# Patient Record
Sex: MALE | Race: WHITE | Employment: FULL TIME | URBAN - METROPOLITAN AREA
[De-identification: names, ages, dates, MRNs, and addresses within clinical notes are randomized per-mention and may not be internally consistent; named-entity substitution may affect disease eponyms.]

---

## 2019-05-01 ENCOUNTER — HOSPITAL ENCOUNTER (OUTPATIENT)
Dept: WOUND CARE | Age: 64
Discharge: HOME | End: 2019-05-01
Payer: COMMERCIAL

## 2019-05-01 VITALS
HEART RATE: 102 BPM | TEMPERATURE: 99 F | RESPIRATION RATE: 18 BRPM | WEIGHT: 315 LBS | BODY MASS INDEX: 47.74 KG/M2 | HEIGHT: 68 IN | SYSTOLIC BLOOD PRESSURE: 158 MMHG | DIASTOLIC BLOOD PRESSURE: 66 MMHG

## 2019-05-01 PROCEDURE — 11042 DBRDMT SUBQ TIS 1ST 20SQCM/<: CPT

## 2019-05-01 RX ORDER — METFORMIN HYDROCHLORIDE 500 MG/1
500 TABLET ORAL
COMMUNITY

## 2019-05-01 RX ORDER — FUROSEMIDE 20 MG/1
20 TABLET ORAL 2 TIMES DAILY
Status: ON HOLD | COMMUNITY
End: 2019-07-20 | Stop reason: SDUPTHER

## 2019-05-01 RX ORDER — LISINOPRIL 10 MG/1
10 TABLET ORAL DAILY
COMMUNITY

## 2019-05-01 RX ORDER — AMLODIPINE BESYLATE 5 MG/1
5 TABLET ORAL DAILY
COMMUNITY

## 2019-05-01 RX ORDER — POTASSIUM CHLORIDE 750 MG/1
20 TABLET, FILM COATED, EXTENDED RELEASE ORAL DAILY
COMMUNITY

## 2019-05-01 RX ORDER — MELOXICAM 15 MG/1
15 TABLET ORAL
COMMUNITY

## 2019-05-01 NOTE — WOUND CARE
Wound Date Acquired:  4/24/19 How was the wound acquired:Unknown Who referred to wound clinic: Deena Lay MD PCP Any cultures done:None Antibiotic use current or past: Augmentin x 15 days 4/1/19 for Right leg How as the wound be treated:Neosporin,  Non-Adherent 05/01/19 1338 Wound Leg lower Left; Anterior Date First Assessed/Time First Assessed: 05/01/19 1337   Present on Hospital Admission: Yes  Wound Approximate Age at First Assessment (Weeks): 2 weeks  Primary Wound Type: Venous  Location: Leg lower  Wound Location Orientation: Left; Anterior Dressing Status Removed Dressing Type Non adherent;Gauze Wound Length (cm) 0.9 cm Wound Width (cm) 1.4 cm Wound Depth (cm) 0.2 cm Wound Volume (cm^3) 0.25 cm^3 Condition of [facility] Condition of Edges Rolled/curled Drainage Amount Moderate Drainage Color Serosanguinous Wound Odor None Janna-wound Assessment Blanchable erythema Margins Epibole (rolled edges) Cleansing and Cleansing Agents  Normal saline Visit Vitals /66 (BP 1 Location: Right arm, BP Patient Position: Sitting) Pulse (!) 102 Temp 99 °F (37.2 °C) Resp 18 Ht 5' 8\" (1.727 m) Wt (!) 190.5 kg (420 lb) BMI 63.86 kg/m²

## 2019-05-01 NOTE — WOUND CARE
05/01/19 1423 Wound Leg lower Left; Anterior Date First Assessed/Time First Assessed: 05/01/19 1337   Present on Hospital Admission: Yes  Wound Approximate Age at First Assessment (Weeks): 2 weeks  Primary Wound Type: Venous  Location: Leg lower  Wound Location Orientation: Left; Anterior Dressing Type Applied Collagens/cell matrix;Gauze; Other (Comment) (Double Tubi  size G stockings) Discharge Condition:Stable Ambulatory Status:Ambulatory Discharge Destination:Home Transportation: Private Accompanied by: Self

## 2019-05-02 NOTE — CONSULTS
[address]    Name:  Adam Dewitt  MR#:  [id number]  :  1955  ACCOUNT #:  [de-identified]  DATE OF SERVICE:  2019    CONSULTING PHYSICIAN:  John Causey DPM    REASON FOR CONSULTATION:  Evaluation and treatment of left leg venous insufficiency-type ulceration. HISTORY OF PRESENT ILLNESS:  The patient is a pleasant 62-year-old white male with a past medical history which includes morbid obesity, sleep apnea, hypertension, asthma, and previous lower extremity ulcerations. He presents with a 1-week history of the wounds on the anterolateral aspect of his upper left leg below the knee. He denies any fevers, chills, sweats, nausea, or vomiting. He denies any trauma to the area. He does relate a history of wounds on his right leg, which have eventually healed that he attributes to the swelling in his legs. PAST MEDICAL HISTORY:  As above. PAST SURGICAL HISTORY:  Significant for ear, nose, and throat surgery. SOCIAL HISTORY:  Positive for being a former smoker. He quit in . Negative for alcohol or drug use. ALLERGIES:  TO PHENOBARBITAL. FAMILY HISTORY:  Significant for diabetes mellitus and heart disease. MEDICATIONS:  Are as follows:  1. Norvasc 5 mg. 2.  Lasix 20 mg.  3.  Lisinopril 10 mg.  4.  Meloxicam 15 mg.  5.  Metformin 500 mg.  6.  Potassium chloride 10 mEq. REVIEW OF SYSTEMS:  Positive for bilateral lower extremity edema. PHYSICAL EXAMINATION:  Upon physical examination, the patient's vital signs are stable. He is afebrile. Examination of his legs reveals hyperpigmentation, edema, and induration in both legs consistent with chronic venous insufficiency. The patient has nonpalpable but clearly Dopplerable DP and PT pulses on the left lower extremity with triphasic waveforms.   He is noted to have a full-thickness sloughy wound on the anterolateral aspect of the left lower leg measuring 0.9 x 1.4 x 0.2 cm in size without any gross signs of infection. ASSESSMENT AND PLAN:  Left lower extremity venous insufficiency-type wound in a patient with extensive edema, venous insufficiency, and morbid obesity. At today's visit, we applied the lidocaine jelly solution to the wound bed in order to achieve anesthesia, and then utilizing a dermal curette, all fibrinous slough and nonviable tissue was excisionally debrided through the wound through full-thickness skin and subcutaneous tissues down to a healthy bleeding base. Hemostasis was achieved with pressure and compression. After hemostasis was achieved, an Aquacel Ag dressing was applied followed by double Tubigrip stockings for compression. The patient did have ABIs performed with a screening device showing an FABIAN of 0.82 on the left. Left lower extremity venous insufficiency-type ulceration with significant underlying edema at today's visit. We have sent the patient for formal PVRs and ABIs and TBIs to assess the patient's circulation to ensure that he can proceed with compression therapy. We sized him for CircAid-like stockings today as these will certainly benefit him in helping him heal the wound as well as keep these types of wounds from coming back. In the meantime, we have started him on some local wound care with Tubigrip stockings and Aquacel Ag, and he will return for followup in approximately 2 to 3 weeks.       JENARO [id number]  D:  05/01/2019 14:26  T:  05/01/2019 14:34  JOB #:  [id number]  CC:  Maggi Castellano MD

## 2019-05-15 ENCOUNTER — HOSPITAL ENCOUNTER (OUTPATIENT)
Dept: WOUND CARE | Age: 64
Discharge: HOME | End: 2019-05-15
Payer: COMMERCIAL

## 2019-05-15 VITALS
TEMPERATURE: 98.1 F | RESPIRATION RATE: 18 BRPM | DIASTOLIC BLOOD PRESSURE: 79 MMHG | HEART RATE: 83 BPM | SYSTOLIC BLOOD PRESSURE: 129 MMHG

## 2019-05-15 PROCEDURE — 74011000250 HC RX REV CODE- 250: Performed by: PODIATRIST

## 2019-05-15 PROCEDURE — 97597 DBRDMT OPN WND 1ST 20 CM/<: CPT

## 2019-05-15 RX ADMIN — Medication: at 16:00

## 2019-05-15 NOTE — PROGRESS NOTES
Kahlil Qureshi Name:  Melia Castillo 
MR#:  [id number] :  1955 ACCOUNT #:  [de-identified] DATE OF SERVICE:  05/15/2019 CONSULTING PHYSICIAN:  Deya Recinos DPM 
 
REASON FOR CONSULTATION:  Evaluation and treatment of left leg venous insufficiency-type ulceration. HISTORY OF PRESENT ILLNESS:  The patient is a pleasant 45-year-old white male with a past medical history which includes morbid obesity, sleep apnea, hypertension, asthma, and previous lower extremity ulcerations. He presents with a 1-week history of the wounds on the anterolateral aspect of his upper left leg below the knee. He denies any fevers, chills, sweats, nausea, or vomiting. He denies any trauma to the area. He does relate a history of wounds on his right leg, which have eventually healed that he attributes to the swelling in his legs. PAST MEDICAL HISTORY:  As above. PAST SURGICAL HISTORY:  Significant for ear, nose, and throat surgery. SOCIAL HISTORY:  Positive for being a former smoker. He quit in . Negative for alcohol or drug use. ALLERGIES:  TO PHENOBARBITAL. FAMILY HISTORY:  Significant for diabetes mellitus and heart disease. MEDICATIONS:  Are as follows: 1. Norvasc 5 mg. 2.  Lasix 20 mg. 
3.  Lisinopril 10 mg. 
4.  Meloxicam 15 mg. 
5.  Metformin 500 mg. 
6.  Potassium chloride 10 mEq. REVIEW OF SYSTEMS:  Positive for bilateral lower extremity edema. PHYSICAL EXAMINATION:  Upon physical examination, the patient's vital signs are stable. He is afebrile. Examination of his legs reveals hyperpigmentation, edema, and induration in both legs consistent with chronic venous insufficiency. The patient has nonpalpable but clearly Dopplerable DP and PT pulses on the left lower extremity with triphasic waveforms.   He is noted to have a full-thickness sloughy wound on the anterolateral aspect of the left lower leg measuring 0.8 x 1.2 x 0.2 cm in size without any gross signs of infection. ASSESSMENT AND PLAN:  Left lower extremity venous insufficiency-type wound in a patient with extensive edema, venous insufficiency, and morbid obesity. At today's visit, we applied the lidocaine jelly solution to the wound bed in order to achieve anesthesia, and then utilizing a dermal curette, all fibrinous slough and nonviable tissue was selectively debrided through the wound through full-thickness skin and subcutaneous tissues down to a healthy bleeding base. Hemostasis was achieved with pressure and compression. After hemostasis was achieved, an Aquacel Ag dressing was applied followed by double Tubigrip stockings for compression. The patient did have ABIs performed with a screening device showing an FABIAN of 0.82 on the left. Left lower extremity venous insufficiency-type ulceration with significant underlying edema at today's visit. We sized him for CircAid-like stockings last visit and have instructed him to bring these to his next visit. In the meantime, we have started him on some local wound care with Tubigrip stockings and Aquacel Ag, and he will return for followup in approximately 2 to 3 weeks.  
 
 
Tanya Velasco DPM

## 2019-05-15 NOTE — WOUND CARE
05/15/19 1517 Wound Leg lower Left; Anterior Date First Assessed/Time First Assessed: 05/01/19 1337   Present on Hospital Admission: Yes  Wound Approximate Age at First Assessment (Weeks): 2 weeks  Primary Wound Type: Venous  Location: Leg lower  Wound Location Orientation: Left; Anterior Dressing Status Removed Dressing Type Gauze Wound Length (cm) 0.8 cm Wound Width (cm) 1.2 cm Wound Depth (cm) 0.2 cm Wound Volume (cm^3) 0.19 cm^3 [facility] Condition of Edges Rolled/curled Drainage Amount Small Drainage Color Serosanguinous Wound Odor None Janna-wound Assessment Blanchable erythema Margins Epibole (rolled edges) Cleansing and Cleansing Agents  Normal saline Wound Leg lower Right; Anterior Date First Assessed/Time First Assessed: 05/15/19 1519   Present on Hospital Admission: Yes  Primary Wound Type: Venous Ulcer  Location: Leg lower  Wound Location Orientation: Right; Anterior Dressing Type Open to air Wound Length (cm) 7 cm Wound Width (cm) 8.5 cm Wound Depth (cm) 0.1 cm Wound Volume (cm^3) 5.95 cm^3 Condition of Base Pink Drainage Amount Moderate Drainage Color Serous Wound Odor None Janna-wound Assessment Blanchable erythema Cleansing and Cleansing Agents  Normal saline Visit Vitals /79 (BP 1 Location: Right arm, BP Patient Position: Sitting) Pulse 83 Temp 98.1 °F (36.7 °C) Resp 18

## 2019-05-29 ENCOUNTER — HOSPITAL ENCOUNTER (OUTPATIENT)
Dept: WOUND CARE | Age: 64
Discharge: HOME | End: 2019-05-29
Payer: COMMERCIAL

## 2019-05-29 VITALS
HEART RATE: 79 BPM | DIASTOLIC BLOOD PRESSURE: 67 MMHG | SYSTOLIC BLOOD PRESSURE: 122 MMHG | TEMPERATURE: 98.6 F | RESPIRATION RATE: 18 BRPM

## 2019-05-29 PROCEDURE — 29581 APPL MULTLAYER CMPRN SYS LEG: CPT

## 2019-05-29 NOTE — PROGRESS NOTES
[address]    Name:  Glen Ohara  MR#:  [id number]  :  1955  ACCOUNT #:  [de-identified]  DATE OF SERVICE:  2019    CONSULTING PHYSICIAN:  Hernan Krueger DPM    REASON FOR CONSULTATION:  Evaluation and treatment of left leg venous insufficiency-type ulceration. HISTORY OF PRESENT ILLNESS:  The patient is a pleasant 77-year-old white male with a past medical history which includes morbid obesity, sleep apnea, hypertension, asthma, and previous lower extremity ulcerations. He presents with a 1-week history of the wounds on the anterolateral aspect of his upper left leg below the knee. He denies any fevers, chills, sweats, nausea, or vomiting. He denies any trauma to the area. He does relate a history of wounds on his right leg, which have eventually healed that he attributes to the swelling in his legs. He did go for his NIVS but does not have the results with him. We will call for the results. PAST MEDICAL HISTORY:  As above. PAST SURGICAL HISTORY:  Significant for ear, nose, and throat surgery. SOCIAL HISTORY:  Positive for being a former smoker. He quit in . Negative for alcohol or drug use. ALLERGIES:  TO PHENOBARBITAL. FAMILY HISTORY:  Significant for diabetes mellitus and heart disease. MEDICATIONS:  Are as follows:  1. Norvasc 5 mg. 2.  Lasix 20 mg.  3.  Lisinopril 10 mg.  4.  Meloxicam 15 mg.  5.  Metformin 500 mg.  6.  Potassium chloride 10 mEq. REVIEW OF SYSTEMS:  Positive for bilateral lower extremity edema. PHYSICAL EXAMINATION:  Upon physical examination, the patient's vital signs are stable. He is afebrile. Examination of his legs reveals hyperpigmentation, edema, and induration in both legs consistent with chronic venous insufficiency. The patient has nonpalpable but clearly Dopplerable DP and PT pulses on the left lower extremity with triphasic waveforms.   He is noted to have a full-thickness sloughy wound on the anterolateral aspect of the left lower leg measuring 0.8 x 1.2 x 0.2 cm in size without any gross signs of infection. The right leg has a large pre-ulcerative area measuring 9x10 cm in size which is weeping with serous fluid as well. ASSESSMENT AND PLAN:  Left lower extremity venous insufficiency-type wound in a patient with extensive edema, venous insufficiency, and morbid obesity. At today's visit, we applied the lidocaine jelly solution to the wound bed in order to achieve anesthesia, and then utilizing a dermal curette, all fibrinous slough and nonviable tissue was selectively debrided through the wound through full-thickness skin and subcutaneous tissues down to a healthy bleeding base. Hemostasis was achieved with pressure and compression. After hemostasis was achieved, an Aquacel Ag dressing was applied followed by double Tubigrip stockings for compression. We have recommended wearing compression stockings to manage the edema , heal the ulcers and prevent future ulcerations. He has not ordered the device to apply the stockings so  inn the meantime, we have started him on some local wound care with Tubigrip stockings and Aquacel Ag, and he will return for followup in approximately 2 to 3 weeks. We may be able to compress him with 3 layer wraps if his PVRs are adequate.       Jonas Gaines DPM

## 2019-05-29 NOTE — WOUND CARE
05/29/19 1525 Wound Leg lower Right; Anterior Date First Assessed/Time First Assessed: 05/15/19 1519   Present on Hospital Admission: Yes  Primary Wound Type: Venous Ulcer  Location: Leg lower  Wound Location Orientation: Right; Anterior Dressing Type Open to air Wound Length (cm) 9 cm Wound Width (cm) 10 cm Wound Depth (cm) 0.1 cm Wound Volume (cm^3) 9 cm^3 Condition of Base Pink Drainage Amount Moderate Drainage Color Serous Wound Odor None Janna-wound Assessment Blanchable erythema Wound Leg lower Left; Anterior Date First Assessed/Time First Assessed: 05/01/19 1337   Present on Hospital Admission: Yes  Wound Approximate Age at First Assessment (Weeks): 2 weeks  Primary Wound Type: Venous  Location: Leg lower  Wound Location Orientation: Left; Anterior Dressing Status Breakthrough drainage;Removed Dressing Type Gauze;Collagens/cell matrix Wound Length (cm) 1.2 cm Wound Width (cm) 1.6 cm Wound Depth (cm) 0.3 cm Wound Volume (cm^3) 0.58 cm^3 Condition of [facility] Condition of Edges Rolled/curled Drainage Amount Small Drainage Color Serosanguinous Wound Odor None Janna-wound Assessment Blanchable erythema Cleansing and Cleansing Agents  Normal saline Visit Vitals /67 Pulse 79 Temp 98.6 °F (37 °C) Resp 18

## 2019-05-29 NOTE — WOUND CARE
05/29/19 1553 Wound Leg lower Right; Anterior Date First Assessed/Time First Assessed: 05/15/19 1519   Present on Hospital Admission: Yes  Primary Wound Type: Venous Ulcer  Location: Leg lower  Wound Location Orientation: Right; Anterior Dressing Type Applied Silver products; Absorptive; Compression Wrap/Venous Stasis Wound Leg lower Left; Anterior Date First Assessed/Time First Assessed: 05/01/19 1337   Present on Hospital Admission: Yes  Wound Approximate Age at First Assessment (Weeks): 2 weeks  Primary Wound Type: Venous  Location: Leg lower  Wound Location Orientation: Left; Anterior Dressing Type Applied Silver products; Absorptive; Compression Wrap/Venous Stasis Procedure Tolerated Well Discharge Condition:Stable Ambulatory Status:Ambulatory Discharge Destination:Home Transportation: Private Accompanied by: Self

## 2019-06-03 ENCOUNTER — HOSPITAL ENCOUNTER (OUTPATIENT)
Dept: WOUND CARE | Age: 64
Discharge: HOME | End: 2019-06-03
Payer: COMMERCIAL

## 2019-06-03 VITALS
HEART RATE: 91 BPM | DIASTOLIC BLOOD PRESSURE: 73 MMHG | SYSTOLIC BLOOD PRESSURE: 157 MMHG | RESPIRATION RATE: 18 BRPM | TEMPERATURE: 98.4 F

## 2019-06-03 PROCEDURE — 29581 APPL MULTLAYER CMPRN SYS LEG: CPT

## 2019-06-03 NOTE — WOUND CARE
06/03/19 1035   Wound Leg lower Right; Anterior   Date First Assessed/Time First Assessed: 05/15/19 1519   Present on Hospital Admission: Yes  Primary Wound Type: Venous Ulcer  Location: Leg lower  Wound Location Orientation: Right; Anterior   Dressing Status Clean, dry, and intact   Dressing Type Alginate; Compression Wrap/Venous Stasis   Non-staged Wound Description Partial thickness   Drainage Amount Small   Drainage Color Serosanguinous   Wound Odor None   Janna-wound Assessment Intact   Cleansing and Cleansing Agents  Soap and water   Dressing Changed Changed/New   Dressing Type Applied Alginate;4 x 4;Compression Wrap/Venous Stasis   Procedure Tolerated Well   Wound Leg lower Left; Anterior   Date First Assessed/Time First Assessed: 05/01/19 1337   Present on Hospital Admission: Yes  Wound Approximate Age at First Assessment (Weeks): 2 weeks  Primary Wound Type: Venous  Location: Leg lower  Wound Location Orientation: Left; Anterior   Dressing Status Clean, dry, and intact   Dressing Type Dry dressing   Non-staged Wound Description Partial thickness   Condition of Base Pink   Drainage Amount Small   Drainage Color Serosanguinous   Wound Odor None   Janna-wound Assessment Intact   Cleansing and Cleansing Agents  Soap and water   Dressing Changed Changed/New   Dressing Type Applied 4 x 4;Alginate; Compression Wrap/Venous Stasis   Procedure Tolerated Well   Discharge Condition:stable  Ambulatory Status:ambulatory  Discharge Destination:home  Transportation: private auto  Accompanied by: self

## 2019-06-05 ENCOUNTER — HOSPITAL ENCOUNTER (OUTPATIENT)
Dept: WOUND CARE | Age: 64
Discharge: HOME | End: 2019-06-05
Payer: COMMERCIAL

## 2019-06-05 VITALS
RESPIRATION RATE: 18 BRPM | HEART RATE: 85 BPM | TEMPERATURE: 98.4 F | SYSTOLIC BLOOD PRESSURE: 123 MMHG | DIASTOLIC BLOOD PRESSURE: 75 MMHG

## 2019-06-05 PROCEDURE — 74011000250 HC RX REV CODE- 250: Performed by: PODIATRIST

## 2019-06-05 PROCEDURE — 97597 DBRDMT OPN WND 1ST 20 CM/<: CPT

## 2019-06-05 RX ADMIN — Medication: at 16:00

## 2019-06-05 NOTE — WOUND CARE
06/05/19 1554 Wound Leg lower Left; Anterior Date First Assessed/Time First Assessed: 05/01/19 1337   Present on Hospital Admission: Yes  Wound Approximate Age at First Assessment (Weeks): 2 weeks  Primary Wound Type: Venous  Location: Leg lower  Wound Location Orientation: Left; Anterior Dressing Type Applied Alginate; Compression Wrap/Venous Stasis Procedure Tolerated Well Discharge Condition:stable Ambulatory Status:ambulatory Discharge Destination:home Transportation: private auto Accompanied by: self Bilateral 3 layer wraps applied.

## 2019-06-05 NOTE — WOUND CARE
06/05/19 1518   [REMOVED] Wound Leg lower Right; Anterior   Final Assessment Date/Final Assessment Time: 06/05/19 1518  Date First Assessed/Time First Assessed: 05/15/19 1519   Present on Hospital Admission: Yes  Primary Wound Type: Venous Ulcer  Location: Leg lower  Wound Location Orientation: Right; Anterior . .. Wound Length (cm) 0 cm   Wound Width (cm) 0 cm   Wound Depth (cm) 0 cm   Wound Volume (cm^3) 0 cm^3   Wound Leg lower Left; Anterior   Date First Assessed/Time First Assessed: 05/01/19 1337   Present on Hospital Admission: Yes  Wound Approximate Age at First Assessment (Weeks): 2 weeks  Primary Wound Type: Venous  Location: Leg lower  Wound Location Orientation: Left; Anterior   Dressing Status Clean, dry, and intact   Dressing Type Compression Wrap/Venous Stasis; Alginate   Non-staged Wound Description Partial thickness   Wound Length (cm) 1.2 cm   Wound Width (cm) 1.1 cm   Wound Depth (cm) 0.2 cm   Wound Volume (cm^3) 0.26 cm^3   Condition of Base Slough   Drainage Amount Small   Drainage Color Serosanguinous   Wound Odor None   Janna-wound Assessment Intact   Cleansing and Cleansing Agents  Soap and water     Visit Vitals  /75   Pulse 85   Temp 98.4 °F (36.9 °C)   Resp 18

## 2019-06-05 NOTE — PROGRESS NOTES
[address]    Name:  Eneida Lancaster  MR#:  [id number]  :  1955  ACCOUNT #:  [de-identified]  DATE OF SERVICE:  2019    CONSULTING PHYSICIAN:  Maria D Martinez DPM    REASON FOR CONSULTATION:  Evaluation and treatment of left leg venous insufficiency-type ulceration. HISTORY OF PRESENT ILLNESS:  The patient is a pleasant 62-year-old white male with a past medical history which includes morbid obesity, sleep apnea, hypertension, asthma, and previous lower extremity ulcerations. He presents with a 1-week history of the wounds on the anterolateral aspect of his upper left leg below the knee. He denies any fevers, chills, sweats, nausea, or vomiting. He denies any trauma to the area. He does relate a history of wounds on his right leg, which have eventually healed that he attributes to the swelling in his legs. PAST MEDICAL HISTORY:  As above. PAST SURGICAL HISTORY:  Significant for ear, nose, and throat surgery. SOCIAL HISTORY:  Positive for being a former smoker. He quit in . Negative for alcohol or drug use. ALLERGIES:  TO PHENOBARBITAL. FAMILY HISTORY:  Significant for diabetes mellitus and heart disease. MEDICATIONS:  Are as follows:  1. Norvasc 5 mg. 2.  Lasix 20 mg.  3.  Lisinopril 10 mg.  4.  Meloxicam 15 mg.  5.  Metformin 500 mg.  6.  Potassium chloride 10 mEq. REVIEW OF SYSTEMS:  Positive for bilateral lower extremity edema. PHYSICAL EXAMINATION:  Upon physical examination, the patient's vital signs are stable. He is afebrile. Examination of his legs reveals hyperpigmentation, edema, and induration in both legs consistent with chronic venous insufficiency. The patient has nonpalpable but clearly Dopplerable DP and PT pulses on the left lower extremity with triphasic waveforms.   He is noted to have a full-thickness sloughy wound on the anterolateral aspect of the left lower leg measuring 0.8 x 1.0 x 0.2 cm in size without any gross signs of infection. The right leg has a large pre-ulcerative area measuring 9x10 cm in size which is weeping with serous fluid as well. ASSESSMENT AND PLAN:  Left lower extremity venous insufficiency-type wound in a patient with extensive edema, venous insufficiency, and morbid obesity. At today's visit, we applied the lidocaine jelly solution to the wound bed in order to achieve anesthesia, and then utilizing a dermal curette, all fibrinous slough and nonviable tissue was selectively debrided through the wound through full-thickness skin and subcutaneous tissues selectively down to a healthy bleeding base. Hemostasis was achieved with pressure and compression. After hemostasis was achieved, an Aquacel Ag dressing was applied followed by double Tubigrip stockings for compression. We have recommended wearing compression stockings to manage the edema , heal the ulcers and prevent future ulcerations. He has not ordered the device to apply the stockings so  inn the meantime, we have started him on some local wound care with Tubigrip stockings and Aquacel Ag, and he will return for followup in approximately 2 to 3 weeks.    Jason Reed DPM

## 2019-06-07 ENCOUNTER — HOSPITAL ENCOUNTER (OUTPATIENT)
Dept: WOUND CARE | Age: 64
Discharge: HOME | End: 2019-06-07
Payer: COMMERCIAL

## 2019-06-07 VITALS
RESPIRATION RATE: 16 BRPM | DIASTOLIC BLOOD PRESSURE: 68 MMHG | TEMPERATURE: 97.2 F | HEART RATE: 77 BPM | SYSTOLIC BLOOD PRESSURE: 130 MMHG

## 2019-06-07 PROCEDURE — 29581 APPL MULTLAYER CMPRN SYS LEG: CPT

## 2019-06-07 NOTE — WOUND CARE
Nurse Visit for dressing change 06/07/19 1058 Wound Leg lower Left; Anterior Date First Assessed/Time First Assessed: 05/01/19 1337   Present on Hospital Admission: Yes  Wound Approximate Age at First Assessment (Weeks): 2 weeks  Primary Wound Type: Venous  Location: Leg lower  Wound Location Orientation: Left; Anterior Dressing Status Clean, dry, and intact; Removed 
(Wrap had slide down some on calf ) Dressing Type Aquacel;Non adherent; Compression Wrap/Venous Stasis Drainage Amount Scant Drainage Color Serosanguinous Wound Odor None Janna-wound Assessment Intact; Blanchable erythema Cleansing and Cleansing Agents  Soap and water;Normal saline Dressing Type Applied Silver products; Non-adherent; Compression Wrap/Venous Stasis Procedure Tolerated Well Visit Vitals /68 (BP 1 Location: Right arm, BP Patient Position: Sitting) Pulse 77 Temp 97.2 °F (36.2 °C) Resp 16 Discharge Condition:Stable Ambulatory Status:Ambulatory Discharge Destination:Home Transportation: Private Accompanied by: Self

## 2019-06-12 ENCOUNTER — HOSPITAL ENCOUNTER (OUTPATIENT)
Dept: WOUND CARE | Age: 64
Discharge: HOME | End: 2019-06-12
Payer: COMMERCIAL

## 2019-06-12 VITALS
DIASTOLIC BLOOD PRESSURE: 77 MMHG | TEMPERATURE: 99.3 F | HEART RATE: 92 BPM | SYSTOLIC BLOOD PRESSURE: 125 MMHG | RESPIRATION RATE: 16 BRPM

## 2019-06-12 PROCEDURE — 97597 DBRDMT OPN WND 1ST 20 CM/<: CPT

## 2019-06-12 PROCEDURE — 74011000250 HC RX REV CODE- 250: Performed by: PODIATRIST

## 2019-06-12 RX ADMIN — Medication: at 16:00

## 2019-06-12 NOTE — WOUND CARE
06/12/19 1503 Wound Leg lower Left; Anterior Date First Assessed/Time First Assessed: 05/01/19 1337   Present on Hospital Admission: Yes  Wound Approximate Age at First Assessment (Weeks): 2 weeks  Primary Wound Type: Venous  Location: Leg lower  Wound Location Orientation: Left; Anterior Dressing Status Clean, dry, and intact; Removed Dressing Type Aquacel;Non adherent; Compression Wrap/Venous Stasis Wound Length (cm) 1.1 cm Wound Width (cm) 1.7 cm Wound Depth (cm) 0.2 cm Wound Volume (cm^3) 0.37 cm^3 Condition of [facility] Drainage Amount Small Drainage Color Serosanguinous Wound Odor None Janna-wound Assessment Blanchable erythema Visit Vitals /77 Pulse 92 Temp 99.3 °F (37.4 °C) Resp 16 Due to network connection error unable to obtain and upload image

## 2019-06-12 NOTE — PROGRESS NOTES
[address]    Name:  Robyn Chaudhari  MR#:  [id number]  :  1955  ACCOUNT #:  [de-identified]  DATE OF SERVICE:  2019    CONSULTING PHYSICIAN:  Ammy Graff DPM    REASON FOR CONSULTATION:  Evaluation and treatment of left leg venous insufficiency-type ulceration. HISTORY OF PRESENT ILLNESS:  The patient is a pleasant 62-year-old white male with a past medical history which includes morbid obesity, sleep apnea, hypertension, asthma, and previous lower extremity ulcerations. He presents with a 4-week history of the wounds on the anterolateral aspect of his upper left leg below the knee. He denies any fevers, chills, sweats, nausea, or vomiting. He denies any trauma to the area. He does relate a history of wounds on his right leg, which have eventually healed that he attributes to the swelling in his legs. PAST MEDICAL HISTORY:  As above. PAST SURGICAL HISTORY:  Significant for ear, nose, and throat surgery. SOCIAL HISTORY:  Positive for being a former smoker. He quit in . Negative for alcohol or drug use. ALLERGIES:  TO PHENOBARBITAL. FAMILY HISTORY:  Significant for diabetes mellitus and heart disease. MEDICATIONS:  Are as follows:  1. Norvasc 5 mg. 2.  Lasix 20 mg.  3.  Lisinopril 10 mg.  4.  Meloxicam 15 mg.  5.  Metformin 500 mg.  6.  Potassium chloride 10 mEq. REVIEW OF SYSTEMS:  Positive for bilateral lower extremity edema. PHYSICAL EXAMINATION:  Upon physical examination, the patient's vital signs are stable. He is afebrile. Examination of his legs reveals hyperpigmentation, edema, and induration in both legs consistent with chronic venous insufficiency. The patient has nonpalpable but clearly Dopplerable DP and PT pulses on the left lower extremity with triphasic waveforms.   He is noted to have a full-thickness sloughy wound on the anterolateral aspect of the left lower leg measuring 1.1 x 1.7 x 0.2 cm in size without any gross signs of infection. The right leg has a large area  which is weeping with serous, but is essentially healed. ASSESSMENT AND PLAN:  Left lower extremity venous insufficiency-type wound in a patient with extensive edema, venous insufficiency, and morbid obesity. At today's visit, we applied the lidocaine jelly solution to the wound bed in order to achieve anesthesia, and then utilizing a dermal curette, all fibrinous slough and nonviable tissue was selectively debrided through the wound through full-thickness skin and subcutaneous tissues selectively down to a healthy bleeding base. Hemostasis was achieved with pressure and compression. After hemostasis was achieved, an Aquacel Ag dressing was applied followed by application of his Circ-Aid stockings    We have recommended wearing compression stockings to manage the edema , heal the ulcers and prevent future ulcerations. Followup in approximately 2 to 3 weeks.      Nyla Luz DPM

## 2019-06-12 NOTE — WOUND CARE
06/12/19 1612 Wound Leg lower Left; Anterior Date First Assessed/Time First Assessed: 05/01/19 1337   Present on Hospital Admission: Yes  Wound Approximate Age at First Assessment (Weeks): 2 weeks  Primary Wound Type: Venous  Location: Leg lower  Wound Location Orientation: Left; Anterior Dressing Type Applied Silver products 
(and pt brought in circades) Discharge Condition:stable Ambulatory Status:walking Discharge Destination:Home Transportation: Private Accompanied by: Self

## 2019-06-26 ENCOUNTER — HOSPITAL ENCOUNTER (OUTPATIENT)
Dept: WOUND CARE | Age: 64
Discharge: HOME | End: 2019-06-26
Payer: COMMERCIAL

## 2019-06-26 VITALS
DIASTOLIC BLOOD PRESSURE: 78 MMHG | RESPIRATION RATE: 16 BRPM | SYSTOLIC BLOOD PRESSURE: 160 MMHG | HEART RATE: 96 BPM | TEMPERATURE: 98.2 F

## 2019-06-26 PROCEDURE — 11042 DBRDMT SUBQ TIS 1ST 20SQCM/<: CPT

## 2019-06-26 PROCEDURE — 74011000250 HC RX REV CODE- 250: Performed by: PODIATRIST

## 2019-06-26 RX ADMIN — Medication: at 17:00

## 2019-06-26 NOTE — WOUND CARE
06/26/19 1600 Wound Leg lower Left; Anterior Date First Assessed/Time First Assessed: 05/01/19 1337   Present on Hospital Admission: Yes  Wound Approximate Age at First Assessment (Weeks): 2 weeks  Primary Wound Type: Venous  Location: Leg lower  Wound Location Orientation: Left; Anterior Dressing Status Breakthrough drainage;Removed Dressing Type Aquacel;Gauze Wound Length (cm) 1.1 cm Wound Width (cm) 1.4 cm Wound Depth (cm) 0.2 cm Wound Volume (cm^3) 0.31 cm^3 Condition of [facility] Drainage Amount Small Drainage Color Serosanguinous Wound Odor None Janna-wound Assessment Blanchable erythema Cleansing and Cleansing Agents  Soap and water;Normal saline Visit Vitals /78 (BP 1 Location: Right arm, BP Patient Position: Sitting) Pulse 96 Temp 98.2 °F (36.8 °C) Resp 16 Due to network connection error unable to obtain and upload image

## 2019-06-26 NOTE — WOUND CARE
[address] - H&P Assessment/Plan: 
 
Venous insufficiency with inflammation right lower extremity (I87.321) Venous insufficiency with ulceration and inflammation left lower extremity (U04.678) Non pressure chronic ulcer left leg to the level of fat (L97.222) Lymphedema  (Z99) - Pt evaluated and treated. - Wound debrided as noted in the Procedure Note to healthy granular bleeding margins. 
- Dressing consisting of aqucaell applied. 
- Compression achieved with compression wraps - Patient has attempted elevation, compression wraps and exercise. Lymphedema pumps orderd - F/U 1 week. Subjective: 
Pt complains of wound to left anterior leg. Patient has had weepy legs and ulcerations for multiple years. Patient has been seen the at wound care center for the past 2 months. Patient states he has a hard time putting his circades on. Negative for fever, chills, nausea, vomiting, chest pain, shortness of breath. ROS: 
Consitutional: no weight loss, night sweats, fatigue / malaise / lethargy. Musculoskeletal: no joint / extremity pain, misalignment, stiffness, decreased ROM, crepitus. Integument: No pruritis, rashes, lesions, left leg wounds. Psychiatric: No depression, anxiety, paranoia History: 
Wound Care Allergies Allergen Reactions  Phenobarbital Unknown (comments) Family History Problem Relation Age of Onset  Heart Disease Mother  Heart Disease Father  Diabetes Brother Past Medical History:  
Diagnosis Date  Arthritis  Asthma  Diabetes ([address]  Hypertension  Morbid obesity ([address]  Sleep apnea Past Surgical History:  
Procedure Laterality Date  HX HEENT Social History Tobacco Use  Smoking status: Former Smoker Last attempt to quit: 2012 Years since quittin.4  Smokeless tobacco: Never Used Substance Use Topics  Alcohol use: Not Currently Social History Substance and Sexual Activity Alcohol Use Not Currently Social History Substance and Sexual Activity Drug Use Not Currently Social History Tobacco Use Smoking Status Former Smoker  Last attempt to quit:   Years since quittin.4 Smokeless Tobacco Never Used Current Outpatient Medications Medication Sig  
 naltrexone-buPROPion (CONTRAVE) 8-90 mg TbER ER tablet Take 2 Tabs by mouth. First Month: Contrave 8mg/90mg #70 Week 1 : 1 Tab AM 
Week 2 : 1 Tab AM, 1 Tab PM 
Week 3 : 2 Tab AM, 1 Tab PM 
Week 4 : 2 Tab AM, 2 Tab PM 
Week 5 and Beyond: Contrave 8mg/90mg #120 
 2 Tab AM, 2 Tab PM  
 lisinopril (PRINIVIL, ZESTRIL) 10 mg tablet Take 10 mg by mouth daily.  amLODIPine (NORVASC) 5 mg tablet Take 5 mg by mouth daily.  metFORMIN (GLUCOPHAGE) 500 mg tablet Take 500 mg by mouth daily (with breakfast).  furosemide (LASIX) 20 mg tablet Take 20 mg by mouth two (2) times a day.  potassium chloride SR (KLOR-CON 10) 10 mEq tablet Take 20 mEq by mouth daily.  meloxicam (MOBIC) 15 mg tablet Take 15 mg by mouth daily as needed for Pain. Current Facility-Administered Medications Medication Dose Route Frequency  [COMPLETED] lidocaine (ALOCANE) 4 % topical gel   Topical NOW Objective: 
Visit Vitals /78 (BP 1 Location: Right arm, BP Patient Position: Sitting) Pulse 96 Temp 98.2 °F (36.8 °C) Resp 16 Vascular: B/L LE 
DP 1/4; PT 1/4 
capillary fill time brisk, pitting and spongy edema is present B/L, skin temperature is cool, varicosities are absent. Dermatological: B/L erythematous patches that are weepy both legs  exhibits hemosiderin deposition. Wound:  
Location: left anterior lateral leg Measurements: per RN note Margins: intact Drainage: serous Odor: none Wound base: 50% fibrotic 50% granular Lymphangitic streaking? No. 
Undermining? No. 
Sinus tracts? No. 
Exposed bone? No. 
Subcutaneous crepitation on palpation? No. 
 
Nails are thickened, 3mm thick, with subungual debris. There are extensive skin cracks at both heels. There is no maceration of the interspaces of the feet b/l. Neurological: DTR are present, protective sensation per 5.07 [place] monofilament is intact, patient is AAOx3, mood is normal. Epicritic sensation is intact. Orthopedic: B/L LE are symmetric, ROM of ankle, STJ, 1st MTPJ is limited, MMT 5 out of 5 for B/L LE. There has been no amputation Constitutional: Pt is a well developed, elderly obese male Lymphatics: negative tenderness to palpation of neck/axillary/inguinal nodes.

## 2019-07-03 ENCOUNTER — HOSPITAL ENCOUNTER (OUTPATIENT)
Dept: WOUND CARE | Age: 64
Discharge: HOME | End: 2019-07-03
Payer: COMMERCIAL

## 2019-07-03 VITALS
TEMPERATURE: 97.7 F | HEART RATE: 94 BPM | SYSTOLIC BLOOD PRESSURE: 138 MMHG | DIASTOLIC BLOOD PRESSURE: 74 MMHG | RESPIRATION RATE: 16 BRPM

## 2019-07-03 PROCEDURE — 29581 APPL MULTLAYER CMPRN SYS LEG: CPT

## 2019-07-12 ENCOUNTER — HOSPITAL ENCOUNTER (INPATIENT)
Age: 64
LOS: 13 days | Discharge: NURSING, REHAB OR LONG-TERM CARE | DRG: 871 | End: 2019-07-26
Attending: EMERGENCY MEDICINE | Admitting: INTERNAL MEDICINE
Payer: COMMERCIAL

## 2019-07-12 DIAGNOSIS — L03.116 CELLULITIS OF LEFT LOWER EXTREMITY: Primary | ICD-10-CM

## 2019-07-12 DIAGNOSIS — N17.9 ACUTE RENAL FAILURE, UNSPECIFIED ACUTE RENAL FAILURE TYPE (HCC): ICD-10-CM

## 2019-07-12 DIAGNOSIS — A41.9 SEPSIS, DUE TO UNSPECIFIED ORGANISM: ICD-10-CM

## 2019-07-12 PROCEDURE — 93005 ELECTROCARDIOGRAM TRACING: CPT

## 2019-07-12 PROCEDURE — 83605 ASSAY OF LACTIC ACID: CPT

## 2019-07-12 PROCEDURE — 99285 EMERGENCY DEPT VISIT HI MDM: CPT

## 2019-07-13 ENCOUNTER — APPOINTMENT (OUTPATIENT)
Dept: VASCULAR SURGERY | Age: 64
DRG: 871 | End: 2019-07-13
Attending: FAMILY MEDICINE
Payer: COMMERCIAL

## 2019-07-13 ENCOUNTER — APPOINTMENT (OUTPATIENT)
Dept: CT IMAGING | Age: 64
DRG: 871 | End: 2019-07-13
Attending: FAMILY MEDICINE
Payer: COMMERCIAL

## 2019-07-13 ENCOUNTER — APPOINTMENT (OUTPATIENT)
Dept: ULTRASOUND IMAGING | Age: 64
DRG: 871 | End: 2019-07-13
Attending: INTERNAL MEDICINE
Payer: COMMERCIAL

## 2019-07-13 ENCOUNTER — APPOINTMENT (OUTPATIENT)
Dept: GENERAL RADIOLOGY | Age: 64
DRG: 871 | End: 2019-07-13
Attending: EMERGENCY MEDICINE
Payer: COMMERCIAL

## 2019-07-13 PROBLEM — A41.9 SEPSIS (HCC): Status: ACTIVE | Noted: 2019-07-13

## 2019-07-13 LAB
ALBUMIN SERPL-MCNC: 2.4 G/DL (ref 3.5–5)
ALBUMIN/GLOB SERPL: 0.5 {RATIO} (ref 1.1–2.2)
ALP SERPL-CCNC: 110 U/L (ref 45–117)
ALT SERPL-CCNC: 38 U/L (ref 12–78)
ANION GAP SERPL CALC-SCNC: 14 MMOL/L (ref 5–15)
APPEARANCE UR: ABNORMAL
APTT PPP: 28.7 SEC (ref 22.1–32)
AST SERPL-CCNC: 75 U/L (ref 15–37)
BACTERIA URNS QL MICRO: ABNORMAL /HPF
BASOPHILS # BLD: 0 K/UL (ref 0–0.1)
BASOPHILS NFR BLD: 0 % (ref 0–1)
BILIRUB SERPL-MCNC: 0.6 MG/DL (ref 0.2–1)
BILIRUB UR QL: NEGATIVE
BNP SERPL-MCNC: 689 PG/ML
BUN SERPL-MCNC: 43 MG/DL (ref 6–20)
BUN/CREAT SERPL: 18 (ref 12–20)
CALCIUM SERPL-MCNC: 8.2 MG/DL (ref 8.5–10.1)
CHLORIDE SERPL-SCNC: 97 MMOL/L (ref 97–108)
CO2 SERPL-SCNC: 22 MMOL/L (ref 21–32)
COLOR UR: ABNORMAL
COMMENT, HOLDF: NORMAL
COMMENT, HOLDF: NORMAL
CREAT SERPL-MCNC: 2.34 MG/DL (ref 0.7–1.3)
CREAT UR-MCNC: 119 MG/DL
DIFFERENTIAL METHOD BLD: ABNORMAL
EOSINOPHIL # BLD: 0 K/UL (ref 0–0.4)
EOSINOPHIL NFR BLD: 0 % (ref 0–7)
EPITH CASTS URNS QL MICRO: ABNORMAL /LPF
ERYTHROCYTE [DISTWIDTH] IN BLOOD BY AUTOMATED COUNT: 13.2 % (ref 11.5–14.5)
EST. AVERAGE GLUCOSE BLD GHB EST-MCNC: 146 MG/DL
GLOBULIN SER CALC-MCNC: 4.7 G/DL (ref 2–4)
GLUCOSE BLD STRIP.AUTO-MCNC: 134 MG/DL (ref 65–100)
GLUCOSE BLD STRIP.AUTO-MCNC: 139 MG/DL (ref 65–100)
GLUCOSE BLD STRIP.AUTO-MCNC: 157 MG/DL (ref 65–100)
GLUCOSE BLD STRIP.AUTO-MCNC: 206 MG/DL (ref 65–100)
GLUCOSE SERPL-MCNC: 139 MG/DL (ref 65–100)
GLUCOSE UR STRIP.AUTO-MCNC: NEGATIVE MG/DL
GRAN CASTS URNS QL MICRO: ABNORMAL /LPF
HBA1C MFR BLD: 6.7 % (ref 4.2–6.3)
HCT VFR BLD AUTO: 33.9 % (ref 36.6–50.3)
HGB BLD-MCNC: 11.7 G/DL (ref 12.1–17)
HGB UR QL STRIP: ABNORMAL
IMM GRANULOCYTES # BLD AUTO: 0 K/UL
IMM GRANULOCYTES NFR BLD AUTO: 0 %
KETONES UR QL STRIP.AUTO: ABNORMAL MG/DL
LACTATE BLD-SCNC: 1.2 MMOL/L (ref 0.4–2)
LACTATE BLD-SCNC: 3.53 MMOL/L (ref 0.4–2)
LEUKOCYTE ESTERASE UR QL STRIP.AUTO: NEGATIVE
LYMPHOCYTES # BLD: 1.9 K/UL (ref 0.8–3.5)
LYMPHOCYTES NFR BLD: 8 % (ref 12–49)
MCH RBC QN AUTO: 30.2 PG (ref 26–34)
MCHC RBC AUTO-ENTMCNC: 34.5 G/DL (ref 30–36.5)
MCV RBC AUTO: 87.4 FL (ref 80–99)
MONOCYTES # BLD: 1.2 K/UL (ref 0–1)
MONOCYTES NFR BLD: 5 % (ref 5–13)
NEUTS SEG # BLD: 20.2 K/UL (ref 1.8–8)
NEUTS SEG NFR BLD: 87 % (ref 32–75)
NITRITE UR QL STRIP.AUTO: NEGATIVE
NRBC # BLD: 0 K/UL (ref 0–0.01)
NRBC BLD-RTO: 0 PER 100 WBC
PH UR STRIP: 5.5 [PH] (ref 5–8)
PLATELET # BLD AUTO: 239 K/UL (ref 150–400)
PMV BLD AUTO: 11.2 FL (ref 8.9–12.9)
POTASSIUM SERPL-SCNC: 3.5 MMOL/L (ref 3.5–5.1)
PROT SERPL-MCNC: 7.1 G/DL (ref 6.4–8.2)
PROT UR STRIP-MCNC: 30 MG/DL
RBC # BLD AUTO: 3.88 M/UL (ref 4.1–5.7)
RBC #/AREA URNS HPF: ABNORMAL /HPF (ref 0–5)
RBC MORPH BLD: ABNORMAL
SAMPLES BEING HELD,HOLD: NORMAL
SAMPLES BEING HELD,HOLD: NORMAL
SERVICE CMNT-IMP: ABNORMAL
SODIUM SERPL-SCNC: 133 MMOL/L (ref 136–145)
SODIUM UR-SCNC: 12 MMOL/L
SP GR UR REFRACTOMETRY: 1.02 (ref 1–1.03)
THERAPEUTIC RANGE,PTTT: NORMAL SECS (ref 58–77)
UA: UC IF INDICATED,UAUC: ABNORMAL
UROBILINOGEN UR QL STRIP.AUTO: 0.2 EU/DL (ref 0.2–1)
WBC # BLD AUTO: 23.3 K/UL (ref 4.1–11.1)
WBC URNS QL MICRO: ABNORMAL /HPF (ref 0–4)

## 2019-07-13 PROCEDURE — 85730 THROMBOPLASTIN TIME PARTIAL: CPT

## 2019-07-13 PROCEDURE — 74011636637 HC RX REV CODE- 636/637: Performed by: FAMILY MEDICINE

## 2019-07-13 PROCEDURE — 83880 ASSAY OF NATRIURETIC PEPTIDE: CPT

## 2019-07-13 PROCEDURE — 70450 CT HEAD/BRAIN W/O DYE: CPT

## 2019-07-13 PROCEDURE — 74011000258 HC RX REV CODE- 258: Performed by: EMERGENCY MEDICINE

## 2019-07-13 PROCEDURE — 76770 US EXAM ABDO BACK WALL COMP: CPT

## 2019-07-13 PROCEDURE — 87040 BLOOD CULTURE FOR BACTERIA: CPT

## 2019-07-13 PROCEDURE — 83605 ASSAY OF LACTIC ACID: CPT

## 2019-07-13 PROCEDURE — 74011000258 HC RX REV CODE- 258: Performed by: FAMILY MEDICINE

## 2019-07-13 PROCEDURE — 74011250637 HC RX REV CODE- 250/637: Performed by: INTERNAL MEDICINE

## 2019-07-13 PROCEDURE — 84300 ASSAY OF URINE SODIUM: CPT

## 2019-07-13 PROCEDURE — 85025 COMPLETE CBC W/AUTO DIFF WBC: CPT

## 2019-07-13 PROCEDURE — 74011636637 HC RX REV CODE- 636/637: Performed by: HOSPITALIST

## 2019-07-13 PROCEDURE — 96374 THER/PROPH/DIAG INJ IV PUSH: CPT

## 2019-07-13 PROCEDURE — 82962 GLUCOSE BLOOD TEST: CPT

## 2019-07-13 PROCEDURE — 71045 X-RAY EXAM CHEST 1 VIEW: CPT

## 2019-07-13 PROCEDURE — 81001 URINALYSIS AUTO W/SCOPE: CPT

## 2019-07-13 PROCEDURE — 74011250636 HC RX REV CODE- 250/636: Performed by: INTERNAL MEDICINE

## 2019-07-13 PROCEDURE — 87086 URINE CULTURE/COLONY COUNT: CPT

## 2019-07-13 PROCEDURE — 82570 ASSAY OF URINE CREATININE: CPT

## 2019-07-13 PROCEDURE — 65660000000 HC RM CCU STEPDOWN

## 2019-07-13 PROCEDURE — 74011250636 HC RX REV CODE- 250/636: Performed by: FAMILY MEDICINE

## 2019-07-13 PROCEDURE — 36415 COLL VENOUS BLD VENIPUNCTURE: CPT

## 2019-07-13 PROCEDURE — 83036 HEMOGLOBIN GLYCOSYLATED A1C: CPT

## 2019-07-13 PROCEDURE — 80053 COMPREHEN METABOLIC PANEL: CPT

## 2019-07-13 PROCEDURE — 93970 EXTREMITY STUDY: CPT

## 2019-07-13 PROCEDURE — 74011250636 HC RX REV CODE- 250/636

## 2019-07-13 PROCEDURE — 74011250636 HC RX REV CODE- 250/636: Performed by: EMERGENCY MEDICINE

## 2019-07-13 RX ORDER — HEPARIN SODIUM 5000 [USP'U]/ML
5000 INJECTION, SOLUTION INTRAVENOUS; SUBCUTANEOUS EVERY 8 HOURS
Status: DISCONTINUED | OUTPATIENT
Start: 2019-07-13 | End: 2019-07-27 | Stop reason: HOSPADM

## 2019-07-13 RX ORDER — SODIUM CHLORIDE 0.9 % (FLUSH) 0.9 %
5-10 SYRINGE (ML) INJECTION AS NEEDED
Status: DISCONTINUED | OUTPATIENT
Start: 2019-07-13 | End: 2019-07-27 | Stop reason: HOSPADM

## 2019-07-13 RX ORDER — MAGNESIUM SULFATE 100 %
4 CRYSTALS MISCELLANEOUS AS NEEDED
Status: DISCONTINUED | OUTPATIENT
Start: 2019-07-13 | End: 2019-07-27 | Stop reason: HOSPADM

## 2019-07-13 RX ORDER — IPRATROPIUM BROMIDE AND ALBUTEROL SULFATE 2.5; .5 MG/3ML; MG/3ML
3 SOLUTION RESPIRATORY (INHALATION)
Status: DISCONTINUED | OUTPATIENT
Start: 2019-07-13 | End: 2019-07-27 | Stop reason: HOSPADM

## 2019-07-13 RX ORDER — LEVOFLOXACIN 5 MG/ML
750 INJECTION, SOLUTION INTRAVENOUS EVERY 24 HOURS
Status: DISCONTINUED | OUTPATIENT
Start: 2019-07-13 | End: 2019-07-13

## 2019-07-13 RX ORDER — DEXTROSE 50 % IN WATER (D50W) INTRAVENOUS SYRINGE
25-50 AS NEEDED
Status: DISCONTINUED | OUTPATIENT
Start: 2019-07-13 | End: 2019-07-27 | Stop reason: HOSPADM

## 2019-07-13 RX ORDER — SODIUM CHLORIDE, SODIUM LACTATE, POTASSIUM CHLORIDE, CALCIUM CHLORIDE 600; 310; 30; 20 MG/100ML; MG/100ML; MG/100ML; MG/100ML
75 INJECTION, SOLUTION INTRAVENOUS CONTINUOUS
Status: DISCONTINUED | OUTPATIENT
Start: 2019-07-13 | End: 2019-07-16

## 2019-07-13 RX ORDER — ACETAMINOPHEN 325 MG/1
650 TABLET ORAL
Status: DISCONTINUED | OUTPATIENT
Start: 2019-07-13 | End: 2019-07-27 | Stop reason: HOSPADM

## 2019-07-13 RX ORDER — VANCOMYCIN 2 GRAM/500 ML IN 0.9 % SODIUM CHLORIDE INTRAVENOUS
2 ONCE
Status: DISCONTINUED | OUTPATIENT
Start: 2019-07-13 | End: 2019-07-13 | Stop reason: CLARIF

## 2019-07-13 RX ORDER — VANCOMYCIN 1.75 GRAM/500 ML IN 0.9 % SODIUM CHLORIDE INTRAVENOUS
1750 EVERY 24 HOURS
Status: DISCONTINUED | OUTPATIENT
Start: 2019-07-14 | End: 2019-07-14

## 2019-07-13 RX ORDER — SODIUM CHLORIDE 0.9 % (FLUSH) 0.9 %
5-40 SYRINGE (ML) INJECTION AS NEEDED
Status: DISCONTINUED | OUTPATIENT
Start: 2019-07-13 | End: 2019-07-27 | Stop reason: HOSPADM

## 2019-07-13 RX ORDER — INSULIN LISPRO 100 [IU]/ML
INJECTION, SOLUTION INTRAVENOUS; SUBCUTANEOUS
Status: DISCONTINUED | OUTPATIENT
Start: 2019-07-13 | End: 2019-07-17

## 2019-07-13 RX ORDER — VANCOMYCIN HYDROCHLORIDE 1 G/20ML
INJECTION, POWDER, LYOPHILIZED, FOR SOLUTION INTRAVENOUS
Status: COMPLETED
Start: 2019-07-13 | End: 2019-07-13

## 2019-07-13 RX ORDER — INSULIN GLARGINE 100 [IU]/ML
10 INJECTION, SOLUTION SUBCUTANEOUS
Status: DISCONTINUED | OUTPATIENT
Start: 2019-07-13 | End: 2019-07-16

## 2019-07-13 RX ORDER — SODIUM CHLORIDE 0.9 % (FLUSH) 0.9 %
5-40 SYRINGE (ML) INJECTION EVERY 8 HOURS
Status: DISCONTINUED | OUTPATIENT
Start: 2019-07-13 | End: 2019-07-27 | Stop reason: HOSPADM

## 2019-07-13 RX ORDER — SODIUM CHLORIDE 900 MG/100ML
INJECTION INTRAVENOUS
Status: DISPENSED
Start: 2019-07-13 | End: 2019-07-13

## 2019-07-13 RX ADMIN — VANCOMYCIN HYDROCHLORIDE 1000 MG: 1 INJECTION, POWDER, LYOPHILIZED, FOR SOLUTION INTRAVENOUS at 04:00

## 2019-07-13 RX ADMIN — INSULIN LISPRO 3 UNITS: 100 INJECTION, SOLUTION INTRAVENOUS; SUBCUTANEOUS at 09:46

## 2019-07-13 RX ADMIN — ACETAMINOPHEN 650 MG: 325 TABLET ORAL at 20:18

## 2019-07-13 RX ADMIN — PIPERACILLIN AND TAZOBACTAM 3.38 G: 3; .375 INJECTION, POWDER, FOR SOLUTION INTRAVENOUS at 09:45

## 2019-07-13 RX ADMIN — SODIUM CHLORIDE, SODIUM LACTATE, POTASSIUM CHLORIDE, AND CALCIUM CHLORIDE 125 ML/HR: 600; 310; 30; 20 INJECTION, SOLUTION INTRAVENOUS at 05:59

## 2019-07-13 RX ADMIN — VANCOMYCIN HYDROCHLORIDE 1000 MG: 1 INJECTION, POWDER, LYOPHILIZED, FOR SOLUTION INTRAVENOUS at 03:38

## 2019-07-13 RX ADMIN — ACETAMINOPHEN 650 MG: 325 TABLET ORAL at 01:43

## 2019-07-13 RX ADMIN — PIPERACILLIN AND TAZOBACTAM 3.38 G: 3; .375 INJECTION, POWDER, FOR SOLUTION INTRAVENOUS at 16:38

## 2019-07-13 RX ADMIN — INSULIN LISPRO 2 UNITS: 100 INJECTION, SOLUTION INTRAVENOUS; SUBCUTANEOUS at 12:10

## 2019-07-13 RX ADMIN — Medication 10 ML: at 06:13

## 2019-07-13 RX ADMIN — INSULIN GLARGINE 10 UNITS: 100 INJECTION, SOLUTION SUBCUTANEOUS at 21:55

## 2019-07-13 RX ADMIN — SODIUM CHLORIDE 1000 ML: 900 INJECTION, SOLUTION INTRAVENOUS at 01:03

## 2019-07-13 RX ADMIN — PIPERACILLIN SODIUM,TAZOBACTAM SODIUM 3.38 G: 3; .375 INJECTION, POWDER, FOR SOLUTION INTRAVENOUS at 02:56

## 2019-07-13 RX ADMIN — HEPARIN SODIUM 5000 UNITS: 5000 INJECTION INTRAVENOUS; SUBCUTANEOUS at 23:00

## 2019-07-13 RX ADMIN — Medication 10 ML: at 21:54

## 2019-07-13 RX ADMIN — SODIUM CHLORIDE, SODIUM LACTATE, POTASSIUM CHLORIDE, AND CALCIUM CHLORIDE 125 ML/HR: 600; 310; 30; 20 INJECTION, SOLUTION INTRAVENOUS at 21:46

## 2019-07-13 RX ADMIN — HEPARIN SODIUM 5000 UNITS: 5000 INJECTION INTRAVENOUS; SUBCUTANEOUS at 14:36

## 2019-07-13 RX ADMIN — SODIUM CHLORIDE 1000 ML: 900 INJECTION, SOLUTION INTRAVENOUS at 01:43

## 2019-07-13 RX ADMIN — SODIUM CHLORIDE 52 ML: 900 INJECTION, SOLUTION INTRAVENOUS at 02:19

## 2019-07-13 RX ADMIN — HEPARIN SODIUM 5000 UNITS: 5000 INJECTION INTRAVENOUS; SUBCUTANEOUS at 21:54

## 2019-07-13 RX ADMIN — VANCOMYCIN HYDROCHLORIDE 1000 MG: 1 INJECTION, POWDER, LYOPHILIZED, FOR SOLUTION INTRAVENOUS at 01:02

## 2019-07-13 RX ADMIN — Medication 10 ML: at 14:00

## 2019-07-13 NOTE — ED TRIAGE NOTES
Pt c/o of decreased ability to ambulate and care for self since Tuesday. Increased incontinence,general weakness, fevers, increased redness l+R legs. Pt being tx'ed at wound clinic for bilateral leg cellulitis. Pt claims Tuesday he felt confused Tuesday and his speech seemed slurred. Pt. A+o x's4 . All ext = strong. Face symmetrical and speech clear.

## 2019-07-13 NOTE — PROGRESS NOTES
Hospitalist Progress Note  Bubba Keller MD  Office: [phone]        Date of Service:  2019  NAME:  Rehan Quintero  :  1955  MRN:  [id number]    Pt seen and examined discussed care plan      Hospital Problems  Never Reviewed          Codes Class Noted POA    Sepsis [facility]) ICD-10-CM: A41.9  ICD-9-CM: 038.9, 995.91  2019 Unknown                Review of Systems:   A comprehensive review of systems was negative. Vital Signs:    Last 24hrs VS reviewed since prior progress note. Most recent are:  Visit Vitals  /63 (BP 1 Location: Right arm, BP Patient Position: At rest)   Pulse 84   Temp 98.5 °F (36.9 °C)   Resp 20   Ht 5' 8\" (1.727 m)   Wt (!) 185.8 kg (409 lb 9.8 oz)   SpO2 100%   BMI 62.28 kg/m²           Physical Examination:                     Resp:  CTA bilaterally. CV:  Regular rhythm, normal rate    GI:  Soft, non distended, non tender. bs+    Musculoskeletal:  bilateral LE swelling / cellulitis / wound      Neurologic:  Moves all extremities. AAOx3, CN II-XII reviewed         PLAN:     1. Sepsis from cellulitis - cont abx  2. JOSUÉ/ OHS- cont CPAP setting at 15  3. Check A1C and cont SSI last a1c > 11 start lantus  4. Morbid obesity  5.  CKD stage 3 ( no previous values ) OR CYNDI due to ACE+NSAID + metformin   ______________________________________________________________________  EXPECTED LENGTH OF STAY: 3d 16h  ACTUAL LENGTH OF STAY:          0                 Bbuba Keller MD

## 2019-07-13 NOTE — PROGRESS NOTES
Pharmacist Note - Vancomycin Dosing    Consult provided for this 66 y.o. male for indication of sepsis. Antibiotic regimen(s): Zosyn, Levaquin and Vancomycin    Recent Labs     19  0005   WBC 23.3*   CREA 2.34*   BUN 43*     Frequency of BMP: daily  Height: 172.7 cm  Weight: 185.8 kg  Est CrCl: 40 ml/min  Temp (24hrs), Av.9 °F (37.7 °C), Min:99.3 °F (37.4 °C), Max:101.1 °F (38.4 °C)    Cultures: blood      Goal trough = 15 - 20 mcg/mL    Therapy will be initiated with a loading dose of 2000 mg IV x 1 to be followed by a maintenance dose of 1750 mg IV every 24 hours. Pharmacy to follow patient daily and order levels / make dose adjustments as appropriate.

## 2019-07-13 NOTE — H&P
[address] [facility]  HISTORY AND PHYSICAL    Name:  Lan Craven  MR#:  [id number]  :  1955  ACCOUNT #:  [de-identified]  ADMIT DATE:  2019      CHIEF COMPLAINT:  Leg swelling, redness, pain. HISTORY OF PRESENT ILLNESS:  A 32-year-old white male with past medical history of arthritis, asthma, type 2 diabetes mellitus, hypertension, morbid obesity, sleep apnea, who presented as a direct admission/transfer from [facility] Emergency Department to [facility] with the patient's chief complaint of redness, swelling, and pain of both the legs, left greater than right. The patient notes that his urine has been looking dark in color. His initially recorded vital signs in the emergency department, blood pressure 114/49, heart rate 92, respiratory rate 24, and O2 saturation 96% on room air. He was noted to have cellulitis of his left lower extremity. His white cell count was 23,300, neutrophils 87%. Initial point of care lactic acid equals 3.53. The patient had elevated BUN of 43 and creatinine 2.34 with GFR of 28 without prior history of chronic kidney disease per his report. The patient is now seen for admission to the hospitalist service for continued evaluation and treatment. Per the ED, the patient was given 0.9% normal saline with 1000 mL of IV fluid bolus x2 plus additional 52 mL IV fluid for sepsis protocol. He was given vancomycin 1000 mg IV with Zosyn 3.375 g IV. The patient was subsequently transferred to [facility] where he was seen on bedside evaluation for admission. The patient does not complain of any dizziness or lightheadedness, although he currently complains of feeling some confusion over the last few days. In regard to his leg wounds, he notes that he has had chronic right leg wounds, I did see the wounds on the right side are newer. PAST MEDICAL HISTORY:  1. Arthritis. 2.  Asthma. 3.  Type 2 diabetes mellitus.   4. Hypertension. 5.  Morbid obesity. 6.  Sleep apnea (CPAP setting of 15). PAST SURGICAL HISTORY:  Unknown. MEDICATIONS:  Medication list reviewed and noted on chart records. amLODIPine (NORVASC) 5 mg tablet  7/12/2019  --  --  Provider, Historical     Take 5 mg by mouth daily. furosemide (LASIX) 20 mg tablet  7/12/2019  --  --  Provider, Historical    Take 20 mg by mouth two (2) times a day. lisinopril (PRINIVIL, ZESTRIL) 10 mg tablet  7/12/2019  --  --  Provider, Historical    Take 10 mg by mouth daily. meloxicam (MOBIC) 15 mg tablet    --  --  Provider, Historical    Take 15 mg by mouth daily as needed for Pain.    metFORMIN (GLUCOPHAGE) 500 mg tablet  7/12/2019  --  --  Provider, Historical    Take 500 mg by mouth daily (with breakfast). naltrexone-buPROPion (CONTRAVE) 8-90 mg TbER ER tablet  7/12/2019  --  --  Provider, Historical    Take 2 Tabs by mouth. First Month: Contrave 8mg/90mg #70   Week 1 : 1 Tab AM   Week 2 : 1 Tab AM, 1 Tab PM   Week 3 : 2 Tab AM, 1 Tab PM   Week 4 : 2 Tab AM, 2 Tab PM   Week 5 and Beyond: Contrave 8mg/90mg #120    2 Tab AM, 2 Tab PM    potassium chloride SR (KLOR-CON 10) 10 mEq tablet  7/12/2019  --  --  Provider, Historical    Take 20 mEq by mouth daily. ALLERGIES:  PHENOBARBITAL. SOCIAL HISTORY:  Former smoker of cigarettes, reportedly quit in 2012. No reports of alcohol or illicit drugs. FAMILY HISTORY:  Heart disease in mother and father. Diabetes in brother. REVIEW OF SYSTEMS:  Pertinent positives were as noted in the HPI, otherwise negative. PHYSICAL EXAMINATION:  VITAL SIGNS:  Temperature 99.3 degrees Fahrenheit, blood pressure 114/55, heart rate 82, respiratory rate 22, oxygen saturation 97% on room air. Recorded weight 409 pounds (185.8 kg), recorded height of 5 feet 8 inches tall. BMI 62.2. GENERAL:  Morbidly obese male in no acute respiratory distress.   PSYCH:  The patient is awake, alert, oriented x3, slightly anxious. NEUROLOGIC:  GCS of 15. Moves extremities x4 with generalized weakness. Sensation is grossly decreased on the plantar surface of both feet, otherwise intact without slurred speech. No facial droop. HEENT:  Normocephalic, atraumatic. PERRLA. EOMs intact. Sclerae are anicteric. Conjunctivae are clear. Nares are patent. Oropharynx is clear. Tongue is midline, nonedematous. NECK:  Supple without lymphadenopathy, JVD, carotid bruits, or thyromegaly. LYMPH:  Negative for cervical or supraclavicular adenopathy. RESPIRATORY:  Lungs clear to auscultation bilaterally. CVS:  Heart, regular rate and rhythm. Normal S1 and S2 without murmurs, rubs, or gallops. GI:  Abdomen is soft, nontender, and nondistended. Normoactive bowel sounds. No rebound, guarding, or rigidity. No auscultated abdominal bruits or palpable abdominal mass. BACK:  No CVA tenderness. It is difficult to evaluate the patient at this time, having a difficult time turning. :  The patient has noted hydrocele with involution of his penis without associated erythema, warmth, or edema. MUSCULOSKELETAL:  Tenderness on palpation of the left, compared to the right leg with extensive erythema, warmth, edema involving the left leg with stage II open wound. VASCULAR:  2+ radial and 1+ dorsalis pedis pulses without cyanosis and clubbing. There is marked nonpitting edema in bilateral lower extremities on the right greater than left. SKIN:  Warm and dry. LABORATORY DATA:  Reviewed, as follows:  Sodium 133, potassium 3.5, chloride 97, CO2 of 22, BUN of 43, creatinine 2.34, glucose 139, anion gap of 14, calcium 8.2, GFR 28, total bilirubin 0.6, total protein 7.1, albumin is 3.4, ALT of 38, AST of 35, alkaline phosphatase of 110. WBC of 23.3, hemoglobin of 11.7, hematocrit 33.9, platelets 627, and neutrophils 87%. PTT 28.7. Chest x-ray, portable, no acute process.   12-lead EKG, normal sinus rhythm with ST changes in inferior leads at 92 beats per minute. IMPRESSION AND PLAN:  1. Sepsis. Admit the patient to telemetry. Continue with intravenous fluid hydration resuscitation. Placed on broad-spectrum intravenous antibiotics, levofloxacin, Zosyn, and vancomycin. Check blood cultures. The patient has been unable to void urine, as such, urinalysis is not available for review. May adjust antibiotics accordingly. 2.  Cellulitis in left lower extremity. Plan as noted above. Consult with wound care team.  3.  Leukocytosis. Repeat CBC. 4.  Bilateral lower extremity edema. Order venous duplex to lower extremities to rule out any deep venous thrombosis. Also order proBNP level. 5.  Altered mental status had been reportedly per the patient, which he recognized that he has been confused. Order CT of the head without contrast to rule any acute process. 6.  Liver function test elevation. Repeat comprehensive metabolic panel. 7.  Acute kidney injury. Order intravenous fluid hydration. Repeat the renal panel in the a.m.  8.  Type 2 diabetes mellitus. Place on Humalog insulin correction coverage schedule, Accu-Chek, and check hemoglobin A1c level in the a.m. 9.  Morbid obesity. We would strongly recommend eventual weight loss, heart-healthy diet, and lifestyle modification. 10.  Fever. Plan as noted above. 11.  Hypertension. Monitor blood pressure closely. Provide antihypertensive medications as needed. 12.  Obstructive sleep apnea. Order CPAP with current home setting. 13.  Venous thromboembolism prophylaxis. Order heparin 5000 units subcutaneous q.8 hours.         Cuca Hernandez MD [email or web address]  D:  07/13/2019 6:44  T:  07/13/2019 9:50  JOB #:  [id number]

## 2019-07-13 NOTE — ROUTINE PROCESS
Bedside shift change report given to Sarah Miles RN (oncoming nurse) by Cesar Montgomery RN (offgoing nurse). Report included the following information SBAR, Kardex, ED Summary, Intake/Output, MAR, Accordion, Recent Results and Cardiac Rhythm NSR/ST.

## 2019-07-13 NOTE — PROGRESS NOTES
Problem: Pressure Injury - Risk of  Goal: *Prevention of pressure injury  Description  Document Grabiel Scale and appropriate interventions in the flowsheet. Outcome: Progressing Towards Goal  Note:   Pressure Injury Interventions:       Moisture Interventions: Absorbent underpads, Apply protective barrier, creams and emollients, Minimize layers, Moisture barrier, Offer toileting Q_hr    Activity Interventions: Assess need for specialty bed, Increase time out of bed    Mobility Interventions: Assess need for specialty bed, HOB 30 degrees or less    Nutrition Interventions: Document food/fluid/supplement intake    Friction and Shear Interventions: HOB 30 degrees or less, Lift sheet, Minimize layers, Sit at 90-degree angle                Problem: Falls - Risk of  Goal: *Absence of Falls  Description  Document Mariah Late Fall Risk and appropriate interventions in the flowsheet.   Outcome: Progressing Towards Goal  Note:   Fall Risk Interventions:  Mobility Interventions: Communicate number of staff needed for ambulation/transfer, Patient to call before getting OOB         Medication Interventions: Evaluate medications/consider consulting pharmacy, Patient to call before getting OOB, Teach patient to arise slowly    Elimination Interventions: Call light in reach, Patient to call for help with toileting needs, Toilet paper/wipes in reach, Toileting schedule/hourly rounds, Urinal in reach

## 2019-07-13 NOTE — ED PROVIDER NOTES
Adrianna Rodrigues is a 67 yo M with history of Venous insufficiency and chronic lower extremity ulcers. He has noticed increased redness and pain in his legs, particularly his left. Tuesday he felt chilled and confused. He tried to stand but he could not because of the pain. HE has been resting in bed but has not been feeling better. He called 911 to bring him to the ED because he was not able to get up to care for himself.   PAtient denies history of MI, CHF or renal failure           Past Medical History:   Diagnosis Date    Arthritis     Asthma     [facility] ([facility] [address]     Hypertension     Morbid obesity ([facility] [address]     Sleep apnea        Past Surgical History:   Procedure Laterality Date    HX HEENT           Family History:   Problem Relation Age of Onset    Heart Disease Mother     Heart Disease Father     Diabetes Brother        Social History     Socioeconomic History    Marital status: SINGLE     Spouse name: Not on file    Number of children: Not on file    Years of education: Not on file    Highest education level: Not on file   Occupational History    Not on file   Social Needs    Financial resource strain: Not on file    Food insecurity:     Worry: Not on file     Inability: Not on file    Transportation needs:     Medical: Not on file     Non-medical: Not on file   Tobacco Use    Smoking status: Former Smoker     Last attempt to quit:      Years since quittin.5    Smokeless tobacco: Never Used   Substance and Sexual Activity    Alcohol use: Not Currently    Drug use: Not Currently    Sexual activity: Not on file   Lifestyle    Physical activity:     Days per week: Not on file     Minutes per session: Not on file    Stress: Not on file   Relationships    Social connections:     Talks on phone: Not on file     Gets together: Not on file     Attends Hinduism service: Not on file     Active member of club or organization: Not on file     Attends meetings of clubs or organizations: Not on file     Relationship status: Not on file    Intimate partner violence:     Fear of current or ex partner: Not on file     Emotionally abused: Not on file     Physically abused: Not on file     Forced sexual activity: Not on file   Other Topics Concern     Service Not Asked    Blood Transfusions Not Asked    Caffeine Concern Not Asked    Occupational Exposure Not Asked   Glenora Patrick Hazards Not Asked    Sleep Concern Not Asked    Stress Concern Not Asked    Weight Concern Not Asked    Special Diet Not Asked    Back Care Not Asked    Exercise Not Asked    Bike Helmet Not Asked   [address] Not Asked    Self-Exams Not Asked   Social History Narrative    Not on file         ALLERGIES: Phenobarbital    Review of Systems   Constitutional: Positive for chills and fatigue. HENT: Negative for sore throat. Eyes: Negative for visual disturbance. Respiratory: Negative for cough. Cardiovascular: Negative for chest pain. Gastrointestinal: Negative for abdominal pain. Genitourinary: Negative for dysuria. Musculoskeletal: Negative for back pain. Skin: Positive for color change and wound. Negative for rash. Neurological: Negative for weakness and headaches. Vitals:    07/12/19 2349 07/13/19 0026   BP: 114/49    Pulse: 92    Resp: 24    Temp: 99.3 °F (37.4 °C)    SpO2: 96% 98%   Weight: (!) 183.6 kg (404 lb 12.2 oz)    Height: 5' 8\" (1.727 m)             Physical Exam   Constitutional: He appears well-developed and well-nourished. No distress. Morbidly obese   HENT:   Head: Normocephalic and atraumatic. Mouth/Throat: Oropharynx is clear and moist.   Eyes: Conjunctivae and EOM are normal.   Neck: Normal range of motion and phonation normal.   Cardiovascular: Normal rate. Pulmonary/Chest: Effort normal. No respiratory distress. He has no wheezes. He has no rales. Abdominal: He exhibits no distension. Musculoskeletal: Normal range of motion. He exhibits no tenderness. Neurological: He is alert. He is not disoriented. He exhibits normal muscle tone. Skin: Skin is warm and dry. There is erythema (bilaeral lower extremities L>>R.  ). 1cm ulcer anterior left shin   Nursing note and vitals reviewed. ED EKG interpretation:  Rhythm: normal sinus rhythm; and regular . Rate (approx.): 92; Axis: normal; P wave: normal; QRS interval: normal ; ST/T wave: non-specific changes; Other findings: abnormal ekg. This EKG was interpreted by Michael Castaneda MD,ED Provider. MDM       Procedures      Hospitalist Anna for Admission  1:02 AM    ED Room Number: SER08/08  Patient Name and age:  Khari Esquivel 66 y.o.  male  Working Diagnosis:   1. Cellulitis of left lower extremity    2. Acute renal failure, unspecified acute renal failure type ([facility] [address]    3. Sepsis, due to unspecified organism [facility])      Readmission: no  Isolation Requirements:  no  Recommended Level of Care:  telemetry  Code Status:  Full  Other:  Sepsis with ARF due to lower extremity cellulitis. Cr 2.34 today, no prior studies available but patient denies renal failure. Patient described delirium symptoms earlier this week but is alert here. BP normal.  Giving 30ml/kg by IBW now as well as vancomycin. 1:41 AM  Discussed with Dr. Abe Garcia, shared images of cellulitis. Requests addition of Zosyn to Vancomycin for antibiotic coverage. 3:11 AM  Repeat lactic acid after IVNS bolus 1.2. Patient reassessed after IV fluid bolus completed, resting but easily aroused, normal mental status. Lungs clear. Cap refill <2 sec. Cellulitis remains unchanged. Assisted patient in setting up his home CPAP device which he brought with him.       3:45 AM  [facility] at bedside to transport patient to [facility]    Total critical care time spent exclusive of procedures:  40 minutes

## 2019-07-13 NOTE — ED NOTES
TRANSFER - OUT REPORT:    Verbal report given to [facility] on Corinna Calabrese  being transferred to [place] [id number](unit) for routine progression of care       Report consisted of patients Situation, Background, Assessment and   Recommendations(SBAR). Information from the following report(s) ED Summary was reviewed with the receiving nurse. Lines:   Peripheral IV 07/12/19 Right Forearm (Active)   Site Assessment Clean, dry, & intact 7/12/2019 11:56 PM   Phlebitis Assessment 0 7/12/2019 11:56 PM   Infiltration Assessment 0 7/12/2019 11:56 PM   Dressing Status Clean, dry, & intact 7/12/2019 11:56 PM   Dressing Type Transparent 7/12/2019 11:56 PM   Hub Color/Line Status Pink;Patent; Flushed 7/12/2019 11:56 PM   Action Taken Blood drawn 7/12/2019 11:56 PM       Peripheral IV 07/13/19 Left Hand (Active)   Site Assessment Clean, dry, & intact 7/13/2019  1:13 AM   Phlebitis Assessment 0 7/13/2019  1:13 AM   Infiltration Assessment 0 7/13/2019  1:13 AM   Dressing Status Clean, dry, & intact 7/13/2019  1:13 AM   Dressing Type Transparent 7/13/2019  1:13 AM   Hub Color/Line Status Pink;Patent; Flushed 7/13/2019  1:13 AM   Action Taken Blood drawn 7/13/2019  1:13 AM        Opportunity for questions and clarification was provided. Patient transported with:   [facility] [employer]/ I.V.  Fluids/antibiotics

## 2019-07-13 NOTE — CONSULTS
[facility]   [address] [facility], [address] [place] [address], [facility]  Phone: [phone] NOTE     Patient: Adiel Carlton MRN: [id number]  PCP: Roxanne Allred MD   :     1955  Age:   66 y.o. Sex:  male      Referring physician: Nadia Castillo MD  Reason for consultation: 66 y.o. male with Sepsis [facility]) [Z85.5] complicated by CYNDI   Admission Date: 2019 11:35 PM  LOS: 0 days      ASSESSMENT and PLAN :   CYNDI - presumed CYNDI, no recent baseline; volume depletion from outpatient diuretic, ACEi  and Meloxicam the most likely causes; infection possible; rule out obstruction    CKD? ? Chronic venous insufficiency/dermatitis/wounds  -treated at wound care center    HTN, relative hypotension    DM    Obesity, JOSUÉ      REC:  Agree with IVF as ordered  Renal US  Urine sodium and creat  Serial labs            Thank you for consulting [facility] in the care of your patient. Subjective:   HPI: Adiel Carlton is a 66 y.o.  male who has been admitted to the hospital for worsening leg wounds/edema, possible infection, found to have an elevated creat. No recent baseline creat. He was taking Lasix, Lisninopri, and Meloxicam s an outpatient. He deneis N/V/D, and his BP may be a little on the low side. Past Medical Hx:   Past Medical History:   Diagnosis Date    Arthritis     Asthma     Diabetes ([address]     Hypertension     Morbid obesity ([facility] [address]     Sleep apnea         Past Surgical Hx:     Past Surgical History:   Procedure Laterality Date    HX HEENT           Allergies   Allergen Reactions    Phenobarbital Unknown (comments)       Social Hx:  reports that he quit smoking about 7 years ago. He has never used smokeless tobacco. He reports that he drank alcohol. He reports that he has current or past drug history.      Family History   Problem Relation Age of Onset    Heart Disease Mother     Heart Disease Father    [facility] Diabetes Brother        Review of Systems:  A thorough twelve point review of system was performed today. Pertinent positives and negatives are mentioned in the HPI. The reminder of the ROS is negative and noncontributory. Objective:    Vitals:    Vitals:    07/13/19 0343 07/13/19 0511 07/13/19 0928 07/13/19 1108   BP: 100/59 114/55 122/60 133/63   Pulse: 82 82 84 84   Resp: 19 22 20 20   Temp: 99.3 °F (37.4 °C)  98.2 °F (36.8 °C) 98.5 °F (36.9 °C)   SpO2: 96% 97% 96% 100%   Weight:  (!) 185.8 kg (409 lb 9.8 oz)     Height:  5' 8\" (1.727 m)       I&O's:  07/12 0701 - 07/13 0700  In: 2667 [P.O.:240;  I.V.:2427]  Out: 500 [Urine:500]  Visit Vitals  /63 (BP 1 Location: Right arm, BP Patient Position: At rest)   Pulse 84   Temp 98.5 °F (36.9 °C)   Resp 20   Ht 5' 8\" (1.727 m)   Wt (!) 185.8 kg (409 lb 9.8 oz)   SpO2 100%   BMI 62.28 kg/m²       Physical Exam:  General:  No apparent Distress  HEENT:   No Pallor , No Icterus  Neck: Supple,no mass palpable  Lungs : CTA  CVS: RRR, S1 S2 normal, No murmur   Abdomen: Soft, NT, BS +  Extremities: Edema 3+  Skin: chronic dermatitis legs  MS: No joint swelling, erythema, warmth  Neurologic: non focal, AAO x 3  Psych: normal affect    Laboratory Results:    Recent Labs     07/13/19  0005   *   K 3.5   CL 97   CO2 22   *   BUN 43*   CREA 2.34*   CA 8.2*   ALB 2.4*   SGOT 75*   ALT 38     Recent Labs     07/13/19  0005   WBC 23.3*   HGB 11.7*   HCT 33.9*        No results found for: SDES  No results found for: CULT  Recent Results (from the past 24 hour(s))   EKG, 12 LEAD, INITIAL    Collection Time: 07/12/19 11:43 PM   Result Value Ref Range    Ventricular Rate 92 BPM    Atrial Rate 92 BPM    P-R Interval 186 ms    QRS Duration 104 ms    Q-T Interval 364 ms    QTC Calculation (Bezet) 450 ms    Calculated P Axis 50 degrees    Calculated R Axis -21 degrees    Calculated T Axis 45 degrees    Diagnosis       Normal sinus rhythm  Low voltage QRS  Inferior infarct , age undetermined  Abnormal ECG  No previous ECGs available     POC LACTIC ACID    Collection Time: 07/12/19 11:54 PM   Result Value Ref Range    Lactic Acid (POC) 3.53 (HH) 0.40 - 2.00 mmol/L   CBC WITH AUTOMATED DIFF    Collection Time: 07/13/19 12:05 AM   Result Value Ref Range    WBC 23.3 (H) 4.1 - 11.1 K/uL    RBC 3.88 (L) 4.10 - 5.70 M/uL    HGB 11.7 (L) 12.1 - 17.0 g/dL    HCT 33.9 (L) 36.6 - 50.3 %    MCV 87.4 80.0 - 99.0 FL    MCH 30.2 26.0 - 34.0 PG    MCHC 34.5 30.0 - 36.5 g/dL    RDW 13.2 11.5 - 14.5 %    PLATELET 081 703 - 164 K/uL    MPV 11.2 8.9 - 12.9 FL    NRBC 0.0 0  WBC    ABSOLUTE NRBC 0.00 0.00 - 0.01 K/uL    NEUTROPHILS 87 (H) 32 - 75 %    LYMPHOCYTES 8 (L) 12 - 49 %    MONOCYTES 5 5 - 13 %    EOSINOPHILS 0 0 - 7 %    BASOPHILS 0 0 - 1 %    IMMATURE GRANULOCYTES 0 %    ABS. NEUTROPHILS 20.2 (H) 1.8 - 8.0 K/UL    ABS. LYMPHOCYTES 1.9 0.8 - 3.5 K/UL    ABS. MONOCYTES 1.2 (H) 0.0 - 1.0 K/UL    ABS. EOSINOPHILS 0.0 0.0 - 0.4 K/UL    ABS. BASOPHILS 0.0 0.0 - 0.1 K/UL    ABS. IMM. GRANS. 0.0 K/UL    DF MANUAL      RBC COMMENTS NORMOCYTIC, NORMOCHROMIC     METABOLIC PANEL, COMPREHENSIVE    Collection Time: 07/13/19 12:05 AM   Result Value Ref Range    Sodium 133 (L) 136 - 145 mmol/L    Potassium 3.5 3.5 - 5.1 mmol/L    Chloride 97 97 - 108 mmol/L    CO2 22 21 - 32 mmol/L    Anion gap 14 5 - 15 mmol/L    Glucose 139 (H) 65 - 100 mg/dL    BUN 43 (H) 6 - 20 MG/DL    Creatinine 2.34 (H) 0.70 - 1.30 MG/DL    BUN/Creatinine ratio 18 12 - 20      GFR est AA 35 (L) >60 ml/min/1.73m2    GFR est non-AA 28 (L) >60 ml/min/1.73m2    Calcium 8.2 (L) 8.5 - 10.1 MG/DL    Bilirubin, total 0.6 0.2 - 1.0 MG/DL    ALT (SGPT) 38 12 - 78 U/L    AST (SGOT) 75 (H) 15 - 37 U/L    Alk.  phosphatase 110 45 - 117 U/L    Protein, total 7.1 6.4 - 8.2 g/dL    Albumin 2.4 (L) 3.5 - 5.0 g/dL    Globulin 4.7 (H) 2.0 - 4.0 g/dL    A-G Ratio 0.5 (L) 1.1 - 2.2     SAMPLES BEING HELD    Collection Time: 07/13/19  1:45 AM Result Value Ref Range    SAMPLES BEING HELD        Add-on orders for these samples will be processed based on acceptable specimen integrity and analyte stability, which may vary by analyte. COMMENT        Add-on orders for these samples will be processed based on acceptable specimen integrity and analyte stability, which may vary by analyte.    PTT    Collection Time: 07/13/19  1:45 AM   Result Value Ref Range    aPTT 28.7 22.1 - 32.0 sec    aPTT, therapeutic range     58.0 - 77.0 SECS   POC LACTIC ACID    Collection Time: 07/13/19  3:07 AM   Result Value Ref Range    Lactic Acid (POC) 1.20 0.40 - 2.00 mmol/L   URINALYSIS W/ REFLEX CULTURE    Collection Time: 07/13/19  7:16 AM   Result Value Ref Range    Color YELLOW/STRAW      Appearance CLOUDY (A) CLEAR      Specific gravity 1.022 1.003 - 1.030      pH (UA) 5.5 5.0 - 8.0      Protein 30 (A) NEG mg/dL    Glucose NEGATIVE  NEG mg/dL    Ketone TRACE (A) NEG mg/dL    Bilirubin NEGATIVE  NEG      Blood MODERATE (A) NEG      Urobilinogen 0.2 0.2 - 1.0 EU/dL    Nitrites NEGATIVE  NEG      Leukocyte Esterase NEGATIVE  NEG      WBC 5-10 0 - 4 /hpf    RBC 0-5 0 - 5 /hpf    Epithelial cells FEW FEW /lpf    Bacteria 2+ (A) NEG /hpf    UA:UC IF INDICATED URINE CULTURE ORDERED (A) CNI      Granular cast 0-2 (A) NEG /lpf   GLUCOSE, POC    Collection Time: 07/13/19  9:32 AM   Result Value Ref Range    Glucose (POC) 206 (H) 65 - 100 mg/dL    Performed by Ramona Tracy    GLUCOSE, POC    Collection Time: 07/13/19 11:13 AM   Result Value Ref Range    Glucose (POC) 157 (H) 65 - 100 mg/dL    Performed by Olvin PITTMAN          Urine dipstick:   Lab Results   Component Value Date/Time    Color YELLOW/STRAW 07/13/2019 07:16 AM    Appearance CLOUDY (A) 07/13/2019 07:16 AM    Specific gravity 1.022 07/13/2019 07:16 AM    pH (UA) 5.5 07/13/2019 07:16 AM    Protein 30 (A) 07/13/2019 07:16 AM    Glucose NEGATIVE  07/13/2019 07:16 AM    Ketone TRACE (A) 07/13/2019 07:16 AM    Bilirubin NEGATIVE  07/13/2019 07:16 AM    Urobilinogen 0.2 07/13/2019 07:16 AM    Nitrites NEGATIVE  07/13/2019 07:16 AM    Leukocyte Esterase NEGATIVE  07/13/2019 07:16 AM    Epithelial cells FEW 07/13/2019 07:16 AM    Bacteria 2+ (A) 07/13/2019 07:16 AM    WBC 5-10 07/13/2019 07:16 AM    RBC 0-5 07/13/2019 07:16 AM       I have reviewed the following: All pertinent labs, microbiology data, radiology imaging for my assessment     Medications list Personally Reviewed   [x]      Yes     []               No       Medications:  Prior to Admission medications    Medication Sig Start Date End Date Taking? Authorizing Provider   naltrexone-buPROPion (CONTRAVE) 8-90 mg TbER ER tablet Take 2 Tabs by mouth. First Month: Contrave 8mg/90mg #70  Week 1 : 1 Tab AM  Week 2 : 1 Tab AM, 1 Tab PM  Week 3 : 2 Tab AM, 1 Tab PM  Week 4 : 2 Tab AM, 2 Tab PM  Week 5 and Beyond: Contrave 8mg/90mg #120   2 Tab AM, 2 Tab PM   Yes Provider, Historical   lisinopril (PRINIVIL, ZESTRIL) 10 mg tablet Take 10 mg by mouth daily. Yes Provider, Historical   amLODIPine (NORVASC) 5 mg tablet Take 5 mg by mouth daily. Yes Provider, Historical   metFORMIN (GLUCOPHAGE) 500 mg tablet Take 500 mg by mouth daily (with breakfast). Yes Provider, Historical   furosemide (LASIX) 20 mg tablet Take 20 mg by mouth two (2) times a day. Yes Provider, Historical   potassium chloride SR (KLOR-CON 10) 10 mEq tablet Take 20 mEq by mouth daily. Yes Provider, Historical   meloxicam (MOBIC) 15 mg tablet Take 15 mg by mouth daily as needed for Pain. Provider, Historical        Thank you for allowing us to participate in the care of this patient. We will follow patient. Please dont hesitate to call with any questions    Joselin Stone MD  [facility]   [address], [address]  Phone - [phone]   Fax - [phone]  www. [employer]

## 2019-07-13 NOTE — PROGRESS NOTES
Bedside and Verbal shift change report given to Kassandra Sr (oncoming nurse) by Toshia Castellano (offgoing nurse). Report included the following information ED Summary, Intake/Output, Recent Results and Cardiac Rhythm NSR/ST.

## 2019-07-14 LAB
ANION GAP SERPL CALC-SCNC: 7 MMOL/L (ref 5–15)
BASOPHILS # BLD: 0 K/UL (ref 0–0.1)
BASOPHILS NFR BLD: 0 % (ref 0–1)
BUN SERPL-MCNC: 29 MG/DL (ref 6–20)
BUN/CREAT SERPL: 18 (ref 12–20)
CALCIUM SERPL-MCNC: 8.2 MG/DL (ref 8.5–10.1)
CHLORIDE SERPL-SCNC: 106 MMOL/L (ref 97–108)
CK SERPL-CCNC: 541 U/L (ref 39–308)
CO2 SERPL-SCNC: 23 MMOL/L (ref 21–32)
CREAT SERPL-MCNC: 1.58 MG/DL (ref 0.7–1.3)
DIFFERENTIAL METHOD BLD: ABNORMAL
EOSINOPHIL # BLD: 0 K/UL (ref 0–0.4)
EOSINOPHIL NFR BLD: 0 % (ref 0–7)
ERYTHROCYTE [DISTWIDTH] IN BLOOD BY AUTOMATED COUNT: 12.8 % (ref 11.5–14.5)
GLUCOSE BLD STRIP.AUTO-MCNC: 135 MG/DL (ref 65–100)
GLUCOSE BLD STRIP.AUTO-MCNC: 135 MG/DL (ref 65–100)
GLUCOSE BLD STRIP.AUTO-MCNC: 143 MG/DL (ref 65–100)
GLUCOSE BLD STRIP.AUTO-MCNC: 145 MG/DL (ref 65–100)
GLUCOSE SERPL-MCNC: 140 MG/DL (ref 65–100)
HCT VFR BLD AUTO: 33.7 % (ref 36.6–50.3)
HGB BLD-MCNC: 10.9 G/DL (ref 12.1–17)
IMM GRANULOCYTES # BLD AUTO: 0 K/UL
IMM GRANULOCYTES NFR BLD AUTO: 0 %
LYMPHOCYTES # BLD: 1.2 K/UL (ref 0.8–3.5)
LYMPHOCYTES NFR BLD: 7 % (ref 12–49)
MCH RBC QN AUTO: 29.6 PG (ref 26–34)
MCHC RBC AUTO-ENTMCNC: 32.3 G/DL (ref 30–36.5)
MCV RBC AUTO: 91.6 FL (ref 80–99)
MONOCYTES # BLD: 1.2 K/UL (ref 0–1)
MONOCYTES NFR BLD: 7 % (ref 5–13)
NEUTS SEG # BLD: 14.3 K/UL (ref 1.8–8)
NEUTS SEG NFR BLD: 86 % (ref 32–75)
NRBC # BLD: 0 K/UL (ref 0–0.01)
NRBC BLD-RTO: 0 PER 100 WBC
PLATELET # BLD AUTO: 206 K/UL (ref 150–400)
PMV BLD AUTO: 11 FL (ref 8.9–12.9)
POTASSIUM SERPL-SCNC: 3.5 MMOL/L (ref 3.5–5.1)
RBC # BLD AUTO: 3.68 M/UL (ref 4.1–5.7)
RBC MORPH BLD: ABNORMAL
SERVICE CMNT-IMP: ABNORMAL
SODIUM SERPL-SCNC: 136 MMOL/L (ref 136–145)
WBC # BLD AUTO: 16.7 K/UL (ref 4.1–11.1)

## 2019-07-14 PROCEDURE — 74011250636 HC RX REV CODE- 250/636: Performed by: FAMILY MEDICINE

## 2019-07-14 PROCEDURE — 80048 BASIC METABOLIC PNL TOTAL CA: CPT

## 2019-07-14 PROCEDURE — 65270000032 HC RM SEMIPRIVATE

## 2019-07-14 PROCEDURE — 74011000258 HC RX REV CODE- 258: Performed by: FAMILY MEDICINE

## 2019-07-14 PROCEDURE — 85025 COMPLETE CBC W/AUTO DIFF WBC: CPT

## 2019-07-14 PROCEDURE — 74011636637 HC RX REV CODE- 636/637: Performed by: HOSPITALIST

## 2019-07-14 PROCEDURE — 82550 ASSAY OF CK (CPK): CPT

## 2019-07-14 PROCEDURE — 74011250636 HC RX REV CODE- 250/636: Performed by: HOSPITALIST

## 2019-07-14 PROCEDURE — 74011250636 HC RX REV CODE- 250/636: Performed by: INTERNAL MEDICINE

## 2019-07-14 PROCEDURE — 74011250637 HC RX REV CODE- 250/637: Performed by: INTERNAL MEDICINE

## 2019-07-14 PROCEDURE — 74011250636 HC RX REV CODE- 250/636: Performed by: NURSE PRACTITIONER

## 2019-07-14 PROCEDURE — 36415 COLL VENOUS BLD VENIPUNCTURE: CPT

## 2019-07-14 PROCEDURE — 74011636637 HC RX REV CODE- 636/637: Performed by: FAMILY MEDICINE

## 2019-07-14 PROCEDURE — 82962 GLUCOSE BLOOD TEST: CPT

## 2019-07-14 RX ORDER — LISINOPRIL 10 MG/1
10 TABLET ORAL DAILY
Status: DISCONTINUED | OUTPATIENT
Start: 2019-07-14 | End: 2019-07-14

## 2019-07-14 RX ORDER — POTASSIUM CHLORIDE 750 MG/1
20 TABLET, FILM COATED, EXTENDED RELEASE ORAL DAILY
Status: DISCONTINUED | OUTPATIENT
Start: 2019-07-14 | End: 2019-07-27 | Stop reason: HOSPADM

## 2019-07-14 RX ORDER — AMLODIPINE BESYLATE 5 MG/1
5 TABLET ORAL DAILY
Status: DISCONTINUED | OUTPATIENT
Start: 2019-07-14 | End: 2019-07-27 | Stop reason: HOSPADM

## 2019-07-14 RX ORDER — LISINOPRIL 10 MG/1
10 TABLET ORAL DAILY
Status: DISCONTINUED | OUTPATIENT
Start: 2019-07-14 | End: 2019-07-27 | Stop reason: HOSPADM

## 2019-07-14 RX ORDER — FUROSEMIDE 20 MG/1
20 TABLET ORAL 2 TIMES DAILY
Status: DISCONTINUED | OUTPATIENT
Start: 2019-07-14 | End: 2019-07-14

## 2019-07-14 RX ORDER — VANCOMYCIN 2 GRAM/500 ML IN 0.9 % SODIUM CHLORIDE INTRAVENOUS
2000
Status: DISCONTINUED | OUTPATIENT
Start: 2019-07-14 | End: 2019-07-16

## 2019-07-14 RX ORDER — AMLODIPINE BESYLATE 5 MG/1
5 TABLET ORAL DAILY
Status: DISCONTINUED | OUTPATIENT
Start: 2019-07-14 | End: 2019-07-14

## 2019-07-14 RX ORDER — FUROSEMIDE 20 MG/1
20 TABLET ORAL 2 TIMES DAILY
Status: DISCONTINUED | OUTPATIENT
Start: 2019-07-14 | End: 2019-07-18

## 2019-07-14 RX ADMIN — AMLODIPINE BESYLATE 5 MG: 5 TABLET ORAL at 11:11

## 2019-07-14 RX ADMIN — HEPARIN SODIUM 5000 UNITS: 5000 INJECTION INTRAVENOUS; SUBCUTANEOUS at 07:03

## 2019-07-14 RX ADMIN — HEPARIN SODIUM 5000 UNITS: 5000 INJECTION INTRAVENOUS; SUBCUTANEOUS at 22:20

## 2019-07-14 RX ADMIN — SODIUM CHLORIDE, SODIUM LACTATE, POTASSIUM CHLORIDE, AND CALCIUM CHLORIDE 75 ML/HR: 600; 310; 30; 20 INJECTION, SOLUTION INTRAVENOUS at 19:08

## 2019-07-14 RX ADMIN — Medication 10 ML: at 06:00

## 2019-07-14 RX ADMIN — POTASSIUM CHLORIDE 20 MEQ: 750 TABLET, EXTENDED RELEASE ORAL at 11:11

## 2019-07-14 RX ADMIN — PIPERACILLIN AND TAZOBACTAM 3.38 G: 3; .375 INJECTION, POWDER, FOR SOLUTION INTRAVENOUS at 18:33

## 2019-07-14 RX ADMIN — PIPERACILLIN AND TAZOBACTAM 3.38 G: 3; .375 INJECTION, POWDER, FOR SOLUTION INTRAVENOUS at 09:53

## 2019-07-14 RX ADMIN — VANCOMYCIN HYDROCHLORIDE 2000 MG: 10 INJECTION, POWDER, LYOPHILIZED, FOR SOLUTION INTRAVENOUS at 20:26

## 2019-07-14 RX ADMIN — LISINOPRIL 10 MG: 10 TABLET ORAL at 11:11

## 2019-07-14 RX ADMIN — PIPERACILLIN AND TAZOBACTAM 3.38 G: 3; .375 INJECTION, POWDER, FOR SOLUTION INTRAVENOUS at 00:11

## 2019-07-14 RX ADMIN — HEPARIN SODIUM 5000 UNITS: 5000 INJECTION INTRAVENOUS; SUBCUTANEOUS at 16:01

## 2019-07-14 RX ADMIN — INSULIN LISPRO 2 UNITS: 100 INJECTION, SOLUTION INTRAVENOUS; SUBCUTANEOUS at 11:36

## 2019-07-14 RX ADMIN — INSULIN GLARGINE 10 UNITS: 100 INJECTION, SOLUTION SUBCUTANEOUS at 21:50

## 2019-07-14 RX ADMIN — VANCOMYCIN HYDROCHLORIDE 1750 MG: 10 INJECTION, POWDER, LYOPHILIZED, FOR SOLUTION INTRAVENOUS at 01:39

## 2019-07-14 RX ADMIN — Medication 10 ML: at 21:50

## 2019-07-14 NOTE — CDMP QUERY
Pt admitted with Sepsis/Pt noted to have confusion. If possible, please document in the progress notes and d/c summary if you are evaluating and / or treating any of the following: ? Metabolic Encephalopathy ? Septic Encephalopathy 
? Other Encephalopathy 
? Other, please specify ? Clinically unable to determine The medical record reflects the following: 
   Risk Factors: sepsis Clinical Indicators: H/P- Altered mental status had been reportedly per the patient, which he recognized that he has been confused ED-Increased incontinence,general weakness, fevers, increased redness l+R legs. Pt being tx'ed at wound clinic for bilateral leg cellulitis. Pt claims Tuesday he felt confused Tuesday and his speech seemed slurred Treatment: vancomycin IV, levaquin IV Thank you, 
       Nohemi Lee [facility], [address] [facility]

## 2019-07-14 NOTE — PROGRESS NOTES
[facility]   [address] [facility], [address] [place] [address], [facility]  Phone: [phone]   Formerly [phone]       Nephrology Progress Note  Khari Esquivel     1955     [id number]  Date of Admission : 7/12/2019 07/14/19    CC:  Follow up for CYNDI       Assessment and Plan   CYNDI - presumed CYNDI, no recent baseline; volume depletion from outpatient diuretic, ACEi  and Meloxicam the most likely causes; infection possible; rule out obstruction  -no obstruction on imagine  -creat improving with IVF - continue     CKD - ?? baseline     Chronic venous insufficiency/dermatitis/wounds  -treated at wound care center     HTN, relative hypotension     DM     Obesity, JOSUÉ     Interval History:  Feeling better, better U/O      Review of Systems: Pertinent items are noted in HPI.     Current Medications:   Current Facility-Administered Medications   Medication Dose Route Frequency    vancomycin (VANCOCIN) 2000 mg in  ml infusion  2,000 mg IntraVENous Q18H    potassium chloride SR (KLOR-CON 10) tablet 20 mEq  20 mEq Oral DAILY    amLODIPine (NORVASC) tablet 5 mg  5 mg Oral DAILY    furosemide (LASIX) tablet 20 mg  20 mg Oral BID    lisinopril (PRINIVIL, ZESTRIL) tablet 10 mg  10 mg Oral DAILY    sodium chloride (NS) flush 5-10 mL  5-10 mL IntraVENous PRN    acetaminophen (TYLENOL) tablet 650 mg  650 mg Oral Q4H PRN    lactated Ringers infusion  75 mL/hr IntraVENous CONTINUOUS    piperacillin-tazobactam (ZOSYN) 3.375 g in 0.9% sodium chloride (MBP/ADV) 100 mL  3.375 g IntraVENous Q8H    sodium chloride (NS) flush 5-40 mL  5-40 mL IntraVENous Q8H    sodium chloride (NS) flush 5-40 mL  5-40 mL IntraVENous PRN    Vancomycin Pharmacy Dosing   Other Rx Dosing/Monitoring    glucose chewable tablet 16 g  4 Tab Oral PRN    dextrose (D50W) injection syrg 12.5-25 g  25-50 mL IntraVENous PRN    glucagon (GLUCAGEN) injection 1 mg  1 mg IntraMUSCular PRN    insulin lispro (HUMALOG) injection   SubCUTAneous AC&HS    heparin (porcine) injection 5,000 Units  5,000 Units SubCUTAneous Q8H    insulin glargine (LANTUS) injection 10 Units  10 Units SubCUTAneous QHS    albuterol-ipratropium (DUO-NEB) 2.5 MG-0.5 MG/3 ML  3 mL Nebulization Q4H PRN      Allergies   Allergen Reactions    Phenobarbital Unknown (comments)       Objective:  Vitals:    Vitals:    07/13/19 2007 07/14/19 0007 07/14/19 0357 07/14/19 1020   BP: 111/46 114/41 96/44 (!) 142/112   Pulse: 87 77 82 84   Resp: 22 18 22 22   Temp: (!) 100.5 °F (38.1 °C) 98.7 °F (37.1 °C) 99.7 °F (37.6 °C) 99.1 °F (37.3 °C)   SpO2: 99% 96% 92% 95%   Weight:   (!) 186.1 kg (410 lb 4.4 oz)    Height:         Intake and Output:  07/14 0701 - 07/14 1900  In: 480 [P.O.:480]  Out: 450 [Urine:450]  07/12 1901 - 07/14 0700  In: 2667 [P.O.:240; I.V.:2427]  Out: 2600 [Urine:2600]    Physical Examination:  Pt intubated    No  General: NAD,Conversant   Neck:  Supple, no mass  Resp:  Lungs CTA B/L, no wheezing , normal respiratory effort  CV:  RRR,  no murmur or rub,chronic LE edema  GI:  Soft, NT, + Bowel sounds, no hepatosplenomegaly  Neurologic:  Non focal  Psych:             AAO x 3 appropriate affect   Skin:  No Rash      []    High complexity decision making was performed  []    Patient is at high-risk of decompensation with multiple organ involvement    Lab Data Personally Reviewed: I have reviewed all the pertinent labs, microbiology data and radiology studies during assessment.     Recent Labs     07/14/19  0238 07/13/19  0005    133*   K 3.5 3.5    97   CO2 23 22   * 139*   BUN 29* 43*   CREA 1.58* 2.34*   CA 8.2* 8.2*   ALB  --  2.4*   SGOT  --  75*   ALT  --  38     Recent Labs     07/14/19  0238 07/13/19  0005   WBC 16.7* 23.3*   HGB 10.9* 11.7*   HCT 33.7* 33.9*    239     No results found for: DIANA  Lab Results   Component Value Date/Time    Culture result: NO GROWTH AFTER 18 HOURS 07/13/2019 01:08 AM    Culture result: NO GROWTH AFTER 18 HOURS 07/13/2019 01:08 AM     Recent Results (from the past 24 hour(s))   NT-PRO BNP    Collection Time: 07/13/19  3:05 PM   Result Value Ref Range    NT pro- (H) <125 PG/ML   HEMOGLOBIN A1C WITH EAG    Collection Time: 07/13/19  3:05 PM   Result Value Ref Range    Hemoglobin A1c 6.7 (H) 4.2 - 6.3 %    Est. average glucose 146 mg/dL   SODIUM, UR, RANDOM    Collection Time: 07/13/19  4:05 PM   Result Value Ref Range    Sodium,urine random 12 MMOL/L   CREATININE, UR, RANDOM    Collection Time: 07/13/19  4:05 PM   Result Value Ref Range    Creatinine, urine 119.00 mg/dL   SAMPLES BEING HELD    Collection Time: 07/13/19  4:05 PM   Result Value Ref Range    SAMPLES BEING HELD 1UA UHOLD     COMMENT        Add-on orders for these samples will be processed based on acceptable specimen integrity and analyte stability, which may vary by analyte.    GLUCOSE, POC    Collection Time: 07/13/19  4:28 PM   Result Value Ref Range    Glucose (POC) 134 (H) 65 - 100 mg/dL    Performed by [address], POC    Collection Time: 07/13/19  9:49 PM   Result Value Ref Range    Glucose (POC) 139 (H) 65 - 100 mg/dL    Performed by Vicente, BASIC    Collection Time: 07/14/19  2:38 AM   Result Value Ref Range    Sodium 136 136 - 145 mmol/L    Potassium 3.5 3.5 - 5.1 mmol/L    Chloride 106 97 - 108 mmol/L    CO2 23 21 - 32 mmol/L    Anion gap 7 5 - 15 mmol/L    Glucose 140 (H) 65 - 100 mg/dL    BUN 29 (H) 6 - 20 MG/DL    Creatinine 1.58 (H) 0.70 - 1.30 MG/DL    BUN/Creatinine ratio 18 12 - 20      GFR est AA 54 (L) >60 ml/min/1.73m2    GFR est non-AA 45 (L) >60 ml/min/1.73m2    Calcium 8.2 (L) 8.5 - 10.1 MG/DL   CBC WITH AUTOMATED DIFF    Collection Time: 07/14/19  2:38 AM   Result Value Ref Range    WBC 16.7 (H) 4.1 - 11.1 K/uL    RBC 3.68 (L) 4.10 - 5.70 M/uL    HGB 10.9 (L) 12.1 - 17.0 g/dL    HCT 33.7 (L) 36.6 - 50.3 %    MCV 91.6 80.0 - 99.0 FL    MCH 29.6 26.0 - 34.0 PG    MCHC 32.3 30.0 - 36.5 g/dL    RDW 12.8 11.5 - 14.5 %    PLATELET 565 437 - 172 K/uL    MPV 11.0 8.9 - 12.9 FL    NRBC 0.0 0  WBC    ABSOLUTE NRBC 0.00 0.00 - 0.01 K/uL    NEUTROPHILS 86 (H) 32 - 75 %    LYMPHOCYTES 7 (L) 12 - 49 %    MONOCYTES 7 5 - 13 %    EOSINOPHILS 0 0 - 7 %    BASOPHILS 0 0 - 1 %    IMMATURE GRANULOCYTES 0 %    ABS. NEUTROPHILS 14.3 (H) 1.8 - 8.0 K/UL    ABS. LYMPHOCYTES 1.2 0.8 - 3.5 K/UL    ABS. MONOCYTES 1.2 (H) 0.0 - 1.0 K/UL    ABS. EOSINOPHILS 0.0 0.0 - 0.4 K/UL    ABS. BASOPHILS 0.0 0.0 - 0.1 K/UL    ABS. IMM. GRANS. 0.0 K/UL    DF MANUAL      RBC COMMENTS NORMOCYTIC, NORMOCHROMIC     CK    Collection Time: 07/14/19  2:38 AM   Result Value Ref Range     (H) 39 - 308 U/L   GLUCOSE, POC    Collection Time: 07/14/19  6:15 AM   Result Value Ref Range    Glucose (POC) 135 (H) 65 - 100 mg/dL    Performed by Pepe, POC    Collection Time: 07/14/19 11:33 AM   Result Value Ref Range    Glucose (POC) 143 (H) 65 - 100 mg/dL    Performed by Janessa PRICE            Total time spent with patient:  xxx   min. Care Plan discussed with:  Patient     Family      RN      Consulting Physician [facility],         I have reviewed the flowsheets. Chart and Pertinent Notes have been reviewed. No change in PMH ,family and social history from Consult note.       Bianca Lakhani MD

## 2019-07-14 NOTE — PROGRESS NOTES
Hospitalist Progress Note  Saskia Li MD  Office: [phone]        Date of Service:  2019  NAME:  Rubia Jorge  :  1955  MRN:  [id number]    Pt seen and examined discussed care plan      Hospital Problems  Never Reviewed          Codes Class Noted POA    Sepsis [facility]) ICD-10-CM: A41.9  ICD-9-CM: 038.9, 995.91  2019 Unknown                Review of Systems:   A comprehensive review of systems was negative. Vital Signs:    Last 24hrs VS reviewed since prior progress note. Most recent are:  Visit Vitals  BP 96/44 (BP 1 Location: Right arm, BP Patient Position: At rest)   Pulse 82   Temp 99.7 °F (37.6 °C)   Resp 22   Ht 5' 8\" (1.727 m)   Wt (!) 186.1 kg (410 lb 4.4 oz)   SpO2 92%   BMI 62.38 kg/m²           Physical Examination:                     Resp:  CTA bilaterally. CV:  Regular rhythm, normal rate    GI:  Soft, non distended, non tender. bs+    Musculoskeletal:  bilateral LE swelling / cellulitis / wound      Neurologic:  Moves all extremities. AAOx3, CN II-XII reviewed         ASS/PLAN:     1. Sepsis from cellulitis - cont abx, cult neg to date  2. JOSUÉ/ OHS- cont CPAP setting at 15, cont hs   3. Check A1C and cont SSI last a1c > 11 start lantus  Lab Results   Component Value Date/Time    Glucose 140 (H) 2019 02:38 AM    Glucose (POC) 135 (H) 2019 06:15 AM    no changes 2019   4. Morbid obesity  5.  CKD stage 3 ( no previous values ) OR CYNDI due to ACE+NSAID + metformin   Lab Results   Component Value Date/Time    Creatinine 1.58 (H) 2019 02:38 AM    stable   ______________________________________________________________________  EXPECTED LENGTH OF STAY: 3d 16h  ACTUAL LENGTH OF STAY:          1                 Saskia Li MD

## 2019-07-14 NOTE — PROGRESS NOTES
TRANSFER - IN REPORT:    Verbal report received from [address] [place] [address], RN(name) on David Rascon  being received from [facility] ([facility]) for routine progression of care      Report consisted of patients Situation, Background, Assessment and   Recommendations(SBAR). Information from the following report(s) SBAR, Kardex, ED Summary, Procedure Summary, Intake/Output, MAR and Recent Results was reviewed with the receiving nurse. Opportunity for questions and clarification was provided. Assessment completed upon patients arrival to unit and care assumed.

## 2019-07-14 NOTE — PROGRESS NOTES
Day #2 of Vancomycin - Renal Dosing Update  Indication:  Sepsis likely 2/2 cellulitis  Current regimen:  1750 mg IV Q 24 hr  Abx regimen:  Zosyn + Vancomycin  ID Following ?: NO  Concomitant nephrotoxic drugs (requires more frequent monitoring): None  Frequency of BMP?: Daily through     Recent Labs     19  0238 19  0005   WBC 16.7* 23.3*   CREA 1.58* 2.34*   BUN 29* 43*     Est CrCl: ~80 ml/min; UO: ~0.5 ml/kg/hr  Temp (24hrs), Av.4 °F (37.4 °C), Min:98.4 °F (36.9 °C), Max:100.5 °F (38.1 °C)    Cultures:    Blood: NGTD   Urine: pending    Goal trough = 10 - 15 mcg/mL    Recent trough history (date/time/level/dose/action taken):  None    Plan: Given the improvement in the patient's Scr, the dose will be preemptively increased to 2 gm IV Q 18 hr.  Pharmacy will continue to monitor patient daily and will make dosage adjustments based upon changing renal function.

## 2019-07-14 NOTE — PROGRESS NOTES
A Spiritual Care Partner Volunteer visited patient in Rm 507 on 7/14/2019.   Documented by:  Chaplain Villeda MDiv, MS, [facility]  [phone])

## 2019-07-14 NOTE — PROGRESS NOTES
Bedside and Verbal shift change report given to Nazanin (oncoming nurse) by Javier Wilcox (offgoing nurse). Report included the following information SBAR, ED Summary and Cardiac Rhythm NSR.

## 2019-07-14 NOTE — PROGRESS NOTES
Rec'd report from Nicho Ribeiro at bedside. Pt resting in bed. 1000 Up to chair and patient will be transferred to medical bed. 1107 Report called to Karen DE LA CRUZ--pt going to 507.

## 2019-07-15 LAB
ANION GAP SERPL CALC-SCNC: 9 MMOL/L (ref 5–15)
ATRIAL RATE: 92 BPM
BACTERIA SPEC CULT: NORMAL
BASOPHILS # BLD: 0 K/UL (ref 0–0.1)
BASOPHILS NFR BLD: 0 % (ref 0–1)
BUN SERPL-MCNC: 23 MG/DL (ref 6–20)
BUN/CREAT SERPL: 16 (ref 12–20)
CALCIUM SERPL-MCNC: 8 MG/DL (ref 8.5–10.1)
CALCULATED P AXIS, ECG09: 50 DEGREES
CALCULATED R AXIS, ECG10: -21 DEGREES
CALCULATED T AXIS, ECG11: 45 DEGREES
CC UR VC: NORMAL
CHLORIDE SERPL-SCNC: 107 MMOL/L (ref 97–108)
CO2 SERPL-SCNC: 22 MMOL/L (ref 21–32)
CREAT SERPL-MCNC: 1.43 MG/DL (ref 0.7–1.3)
DIAGNOSIS, 93000: NORMAL
DIFFERENTIAL METHOD BLD: ABNORMAL
EOSINOPHIL # BLD: 0.1 K/UL (ref 0–0.4)
EOSINOPHIL NFR BLD: 1 % (ref 0–7)
ERYTHROCYTE [DISTWIDTH] IN BLOOD BY AUTOMATED COUNT: 12.9 % (ref 11.5–14.5)
GLUCOSE BLD STRIP.AUTO-MCNC: 136 MG/DL (ref 65–100)
GLUCOSE BLD STRIP.AUTO-MCNC: 143 MG/DL (ref 65–100)
GLUCOSE BLD STRIP.AUTO-MCNC: 145 MG/DL (ref 65–100)
GLUCOSE BLD STRIP.AUTO-MCNC: 162 MG/DL (ref 65–100)
GLUCOSE SERPL-MCNC: 112 MG/DL (ref 65–100)
HCT VFR BLD AUTO: 31.1 % (ref 36.6–50.3)
HGB BLD-MCNC: 10.2 G/DL (ref 12.1–17)
IMM GRANULOCYTES # BLD AUTO: 0.4 K/UL (ref 0–0.04)
IMM GRANULOCYTES NFR BLD AUTO: 3 % (ref 0–0.5)
LYMPHOCYTES # BLD: 1.9 K/UL (ref 0.8–3.5)
LYMPHOCYTES NFR BLD: 13 % (ref 12–49)
MCH RBC QN AUTO: 29.7 PG (ref 26–34)
MCHC RBC AUTO-ENTMCNC: 32.8 G/DL (ref 30–36.5)
MCV RBC AUTO: 90.7 FL (ref 80–99)
MONOCYTES # BLD: 1.4 K/UL (ref 0–1)
MONOCYTES NFR BLD: 10 % (ref 5–13)
NEUTS SEG # BLD: 10.5 K/UL (ref 1.8–8)
NEUTS SEG NFR BLD: 73 % (ref 32–75)
NRBC # BLD: 0 K/UL (ref 0–0.01)
NRBC BLD-RTO: 0 PER 100 WBC
P-R INTERVAL, ECG05: 186 MS
PLATELET # BLD AUTO: 247 K/UL (ref 150–400)
PMV BLD AUTO: 11.9 FL (ref 8.9–12.9)
POTASSIUM SERPL-SCNC: 3.4 MMOL/L (ref 3.5–5.1)
Q-T INTERVAL, ECG07: 364 MS
QRS DURATION, ECG06: 104 MS
QTC CALCULATION (BEZET), ECG08: 450 MS
RBC # BLD AUTO: 3.43 M/UL (ref 4.1–5.7)
RBC MORPH BLD: ABNORMAL
SERVICE CMNT-IMP: ABNORMAL
SERVICE CMNT-IMP: NORMAL
SODIUM SERPL-SCNC: 138 MMOL/L (ref 136–145)
VENTRICULAR RATE, ECG03: 92 BPM
WBC # BLD AUTO: 14.3 K/UL (ref 4.1–11.1)

## 2019-07-15 PROCEDURE — 85025 COMPLETE CBC W/AUTO DIFF WBC: CPT

## 2019-07-15 PROCEDURE — 77030011256 HC DRSG MEPILEX <16IN NO BORD MOLN -A

## 2019-07-15 PROCEDURE — 80048 BASIC METABOLIC PNL TOTAL CA: CPT

## 2019-07-15 PROCEDURE — 74011000258 HC RX REV CODE- 258: Performed by: FAMILY MEDICINE

## 2019-07-15 PROCEDURE — 74011250636 HC RX REV CODE- 250/636: Performed by: HOSPITALIST

## 2019-07-15 PROCEDURE — 74011000250 HC RX REV CODE- 250: Performed by: NURSE PRACTITIONER

## 2019-07-15 PROCEDURE — 77030029684 HC NEB SM VOL KT MONA -A

## 2019-07-15 PROCEDURE — 77030028894 HC DRSG MEDIH CA ALG INLC -B

## 2019-07-15 PROCEDURE — 36415 COLL VENOUS BLD VENIPUNCTURE: CPT

## 2019-07-15 PROCEDURE — 94640 AIRWAY INHALATION TREATMENT: CPT

## 2019-07-15 PROCEDURE — 74011636637 HC RX REV CODE- 636/637: Performed by: FAMILY MEDICINE

## 2019-07-15 PROCEDURE — 74011250637 HC RX REV CODE- 250/637: Performed by: INTERNAL MEDICINE

## 2019-07-15 PROCEDURE — 65270000032 HC RM SEMIPRIVATE

## 2019-07-15 PROCEDURE — 74011636637 HC RX REV CODE- 636/637: Performed by: HOSPITALIST

## 2019-07-15 PROCEDURE — 82962 GLUCOSE BLOOD TEST: CPT

## 2019-07-15 PROCEDURE — 74011250636 HC RX REV CODE- 250/636: Performed by: NURSE PRACTITIONER

## 2019-07-15 PROCEDURE — 74011250636 HC RX REV CODE- 250/636: Performed by: FAMILY MEDICINE

## 2019-07-15 RX ADMIN — PIPERACILLIN AND TAZOBACTAM 3.38 G: 3; .375 INJECTION, POWDER, FOR SOLUTION INTRAVENOUS at 01:44

## 2019-07-15 RX ADMIN — HEPARIN SODIUM 5000 UNITS: 5000 INJECTION INTRAVENOUS; SUBCUTANEOUS at 06:42

## 2019-07-15 RX ADMIN — LISINOPRIL 10 MG: 10 TABLET ORAL at 09:06

## 2019-07-15 RX ADMIN — INSULIN LISPRO 2 UNITS: 100 INJECTION, SOLUTION INTRAVENOUS; SUBCUTANEOUS at 12:39

## 2019-07-15 RX ADMIN — PIPERACILLIN AND TAZOBACTAM 3.38 G: 3; .375 INJECTION, POWDER, FOR SOLUTION INTRAVENOUS at 09:06

## 2019-07-15 RX ADMIN — IPRATROPIUM BROMIDE AND ALBUTEROL SULFATE 3 ML: .5; 3 SOLUTION RESPIRATORY (INHALATION) at 14:25

## 2019-07-15 RX ADMIN — HEPARIN SODIUM 5000 UNITS: 5000 INJECTION INTRAVENOUS; SUBCUTANEOUS at 22:45

## 2019-07-15 RX ADMIN — INSULIN LISPRO 2 UNITS: 100 INJECTION, SOLUTION INTRAVENOUS; SUBCUTANEOUS at 17:22

## 2019-07-15 RX ADMIN — AMLODIPINE BESYLATE 5 MG: 5 TABLET ORAL at 09:06

## 2019-07-15 RX ADMIN — VANCOMYCIN HYDROCHLORIDE 2000 MG: 10 INJECTION, POWDER, LYOPHILIZED, FOR SOLUTION INTRAVENOUS at 14:20

## 2019-07-15 RX ADMIN — POTASSIUM CHLORIDE 20 MEQ: 750 TABLET, EXTENDED RELEASE ORAL at 09:06

## 2019-07-15 RX ADMIN — PIPERACILLIN AND TAZOBACTAM 3.38 G: 3; .375 INJECTION, POWDER, FOR SOLUTION INTRAVENOUS at 17:21

## 2019-07-15 RX ADMIN — SODIUM CHLORIDE, SODIUM LACTATE, POTASSIUM CHLORIDE, AND CALCIUM CHLORIDE 75 ML/HR: 600; 310; 30; 20 INJECTION, SOLUTION INTRAVENOUS at 09:10

## 2019-07-15 RX ADMIN — FUROSEMIDE 20 MG: 20 TABLET ORAL at 09:05

## 2019-07-15 RX ADMIN — HEPARIN SODIUM 5000 UNITS: 5000 INJECTION INTRAVENOUS; SUBCUTANEOUS at 14:20

## 2019-07-15 RX ADMIN — INSULIN GLARGINE 10 UNITS: 100 INJECTION, SOLUTION SUBCUTANEOUS at 22:45

## 2019-07-15 RX ADMIN — Medication 10 ML: at 22:46

## 2019-07-15 NOTE — PROGRESS NOTES
Bedside and Verbal shift change report given to [employer] (oncoming nurse) by IRMA Keith RN (offgoing nurse). Report given with SBAR, Kardex, Intake/Output, MAR and Recent Results.

## 2019-07-15 NOTE — PROGRESS NOTES
Bedside and Verbal shift change report given to Jimmy Torres RN (oncoming nurse) by Cami Villarreal RN (offgoing nurse). Report included the following information SBAR, Kardex, Procedure Summary, Intake/Output, MAR, Accordion and Recent Results.

## 2019-07-15 NOTE — CONSULTS
pls refer to Dr Breanne Young note       Alfred Arceo MD  [facility]  Office :[phone]  Fax: [phone]

## 2019-07-15 NOTE — PROGRESS NOTES
Hospitalist Progress Note  Tilda Goodpasture, MD  Office: [phone]        Date of Service:  7/15/2019  NAME:  Charisma Sanon  :  1955  MRN:  [id number]    Pt seen and examined discussed care plan    As discussed 7/15/2019 pt for anticipated discharge on oral abx in am 716. Hospital Problems  Never Reviewed          Codes Class Noted POA    Sepsis ([facility] [address] ICD-10-CM: A41.9  ICD-9-CM: 038.9, 995.91  2019 Unknown                Review of Systems:   A comprehensive review of systems was negative. other than slight improvement of lt leg reddness/tenderness. Vital Signs:    Last 24hrs VS reviewed since prior progress note. Most recent are:  Visit Vitals  /70   Pulse 76   Temp 98.6 °F (37 °C)   Resp 18   Ht 5' 8\" (1.727 m)   Wt (!) 187.3 kg (412 lb 14.8 oz)   SpO2 100%   BMI 62.78 kg/m²           Physical Examination:                     Resp:  CTA bilaterally. CV:  Regular rhythm, normal rate    GI:  Soft, non distended, non tender. bs+    Musculoskeletal:  bilateral LE swelling / cellulitis / wound  Alex lt leg, line of demarcation drawn 7/15/2019     Neurologic:  Moves all extremities. AAOx3, CN II-XII reviewed         ASS/PLAN:     1. Sepsis from cellulitis - cont abx, cult neg to date  1a. Septic Encephalopathy, resolved,   2. JOSUÉ/ OHS- cont CPAP setting at 15, cont hs   3. Check A1C and cont SSI last a1c > 11 start lantus  Lab Results   Component Value Date/Time    Glucose 112 (H) 07/15/2019 03:23 AM    Glucose (POC) 162 (H) 07/15/2019 11:09 AM    no changes 7/15/2019   4. Morbid obesity Body mass index is 62.78 kg/m². Pt encouraged to work toward 10 - 15 % wt reduction.    5. CKD stage 3 ( no previous values ) OR CYNDI due to ACE+NSAID + metformin   Lab Results   Component Value Date/Time    Creatinine 1.43 (H) 07/15/2019 03:23 AM    stable   ______________________________________________________________________  EXPECTED LENGTH OF STAY: 3d 16h  ACTUAL LENGTH OF STAY:          2                 Saskia España MD

## 2019-07-15 NOTE — PROGRESS NOTES
[facility]   [address] [facility], [address] [place] [address] [address], [facility]  Phone: [phone]   [phone]       Nephrology Progress Note  Roxana Randall     1955     [id number]  Date of Admission : 7/12/2019  07/15/19    CC:  Follow up for CYNDI       Assessment and Plan   CYNDI - presumed CYNDI, no recent baseline; volume depletion from outpatient diuretic, ACEi  and Meloxicam the most likely causes; infection possible; rule out obstruction  -no obstruction on imagine  -creat improving with IVF - continue  - continue to hold Lisinopril   - may need to resume diuretics tomorrow      CKD - ?? baseline     Chronic venous insufficiency/dermatitis/wounds  -treated at wound care center     HTN, relative hypotension     DM     Obesity, JOSUÉ     Interval History:  Feeling better, better U/O  Has pustular rash on left calf and back of thigh     Review of Systems: Pertinent items are noted in HPI. Current Medications:   Current Facility-Administered Medications   Medication Dose Route Frequency    [START ON 7/16/2019] Vancomycin, Trough - Please draw IMMEDIATELY PRIOR to starting 0800 dose on Tuesday, 7/16. Thanks!    Other ONCE    vancomycin (VANCOCIN) 2000 mg in  ml infusion  2,000 mg IntraVENous Q18H    potassium chloride SR (KLOR-CON 10) tablet 20 mEq  20 mEq Oral DAILY    amLODIPine (NORVASC) tablet 5 mg  5 mg Oral DAILY    furosemide (LASIX) tablet 20 mg  20 mg Oral BID    lisinopril (PRINIVIL, ZESTRIL) tablet 10 mg  10 mg Oral DAILY    sodium chloride (NS) flush 5-10 mL  5-10 mL IntraVENous PRN    acetaminophen (TYLENOL) tablet 650 mg  650 mg Oral Q4H PRN    lactated Ringers infusion  75 mL/hr IntraVENous CONTINUOUS    piperacillin-tazobactam (ZOSYN) 3.375 g in 0.9% sodium chloride (MBP/ADV) 100 mL  3.375 g IntraVENous Q8H    sodium chloride (NS) flush 5-40 mL  5-40 mL IntraVENous Q8H    sodium chloride (NS) flush 5-40 mL  5-40 mL IntraVENous PRN    Vancomycin Pharmacy Dosing   Other Rx Dosing/Monitoring    glucose chewable tablet 16 g  4 Tab Oral PRN    dextrose (D50W) injection syrg 12.5-25 g  25-50 mL IntraVENous PRN    glucagon (GLUCAGEN) injection 1 mg  1 mg IntraMUSCular PRN    insulin lispro (HUMALOG) injection   SubCUTAneous AC&HS    heparin (porcine) injection 5,000 Units  5,000 Units SubCUTAneous Q8H    insulin glargine (LANTUS) injection 10 Units  10 Units SubCUTAneous QHS    albuterol-ipratropium (DUO-NEB) 2.5 MG-0.5 MG/3 ML  3 mL Nebulization Q4H PRN      Allergies   Allergen Reactions    Phenobarbital Unknown (comments)       Objective:  Vitals:    Vitals:    07/14/19 1647 07/14/19 2310 07/15/19 0558 07/15/19 0741   BP: 109/56 116/72  120/70   Pulse: 85 81  76   Resp: 18 18  18   Temp: (!) 101.1 °F (38.4 °C) 98.4 °F (36.9 °C)  98.6 °F (37 °C)   SpO2: 92% 93%  100%   Weight:   (!) 187.3 kg (412 lb 14.8 oz)    Height:         Intake and Output:  No intake/output data recorded. 07/13 1901 - 07/15 0700  In: 6430.8 [P.O.:1580; I.V.:4850.8]  Out: 3475 [Urine:3475]    Physical Examination:  Pt intubated    No  General: NAD,Conversant   Neck:  Supple, no mass  Resp:  Lungs CTA B/L, no wheezing , normal respiratory effort  CV:  RRR,  no murmur or rub,chronic LE edema  GI:  Soft, NT, + Bowel sounds, no hepatosplenomegaly  Neurologic:  Non focal  Psych:             AAO x 3 appropriate affect   Skin:  No Rash      []    High complexity decision making was performed  []    Patient is at high-risk of decompensation with multiple organ involvement    Lab Data Personally Reviewed: I have reviewed all the pertinent labs, microbiology data and radiology studies during assessment.     Recent Labs     07/15/19  0323 07/14/19  0238 07/13/19  0005    136 133*   K 3.4* 3.5 3.5    106 97   CO2 22 23 22   * 140* 139*   BUN 23* 29* 43*   CREA 1.43* 1.58* 2.34*   CA 8.0* 8.2* 8.2*   ALB  --   --  2.4*   SGOT  --   --  75*   ALT  --   --  38     Recent Labs     07/15/19  0323 07/14/19  0238 07/13/19  0005   WBC 14.3* 16.7* 23.3*   HGB 10.2* 10.9* 11.7*   HCT 31.1* 33.7* 33.9*    206 239     No results found for: SDES  Lab Results   Component Value Date/Time    Culture result: NO GROWTH 2 DAYS 07/13/2019 07:16 AM    Culture result: NO GROWTH 2 DAYS 07/13/2019 01:08 AM    Culture result: NO GROWTH 2 DAYS 07/13/2019 01:08 AM     Recent Results (from the past 24 hour(s))   GLUCOSE, POC    Collection Time: 07/14/19 11:33 AM   Result Value Ref Range    Glucose (POC) 143 (H) 65 - 100 mg/dL    Performed by [address], POC    Collection Time: 07/14/19  4:49 PM   Result Value Ref Range    Glucose (POC) 135 (H) 65 - 100 mg/dL    Performed by [address], POC    Collection Time: 07/14/19  9:13 PM   Result Value Ref Range    Glucose (POC) 145 (H) 65 - 100 mg/dL    Performed by Unkown     CBC WITH AUTOMATED DIFF    Collection Time: 07/15/19  3:23 AM   Result Value Ref Range    WBC 14.3 (H) 4.1 - 11.1 K/uL    RBC 3.43 (L) 4.10 - 5.70 M/uL    HGB 10.2 (L) 12.1 - 17.0 g/dL    HCT 31.1 (L) 36.6 - 50.3 %    MCV 90.7 80.0 - 99.0 FL    MCH 29.7 26.0 - 34.0 PG    MCHC 32.8 30.0 - 36.5 g/dL    RDW 12.9 11.5 - 14.5 %    PLATELET 049 154 - 661 K/uL    MPV 11.9 8.9 - 12.9 FL    NRBC 0.0 0  WBC    ABSOLUTE NRBC 0.00 0.00 - 0.01 K/uL    NEUTROPHILS 73 32 - 75 %    LYMPHOCYTES 13 12 - 49 %    MONOCYTES 10 5 - 13 %    EOSINOPHILS 1 0 - 7 %    BASOPHILS 0 0 - 1 %    IMMATURE GRANULOCYTES 3 (H) 0.0 - 0.5 %    ABS. NEUTROPHILS 10.5 (H) 1.8 - 8.0 K/UL    ABS. LYMPHOCYTES 1.9 0.8 - 3.5 K/UL    ABS. MONOCYTES 1.4 (H) 0.0 - 1.0 K/UL    ABS. EOSINOPHILS 0.1 0.0 - 0.4 K/UL    ABS. BASOPHILS 0.0 0.0 - 0.1 K/UL    ABS. IMM.  GRANS. 0.4 (H) 0.00 - 0.04 K/UL    DF SMEAR SCANNED      RBC COMMENTS NORMOCYTIC, NORMOCHROMIC     METABOLIC PANEL, BASIC    Collection Time: 07/15/19  3:23 AM   Result Value Ref Range    Sodium 138 136 - 145 mmol/L    Potassium 3.4 (L) 3.5 - 5.1 mmol/L Chloride 107 97 - 108 mmol/L    CO2 22 21 - 32 mmol/L    Anion gap 9 5 - 15 mmol/L    Glucose 112 (H) 65 - 100 mg/dL    BUN 23 (H) 6 - 20 MG/DL    Creatinine 1.43 (H) 0.70 - 1.30 MG/DL    BUN/Creatinine ratio 16 12 - 20      GFR est AA >60 >60 ml/min/1.73m2    GFR est non-AA 50 (L) >60 ml/min/1.73m2    Calcium 8.0 (L) 8.5 - 10.1 MG/DL   GLUCOSE, POC    Collection Time: 07/15/19  6:23 AM   Result Value Ref Range    Glucose (POC) 136 (H) 65 - 100 mg/dL    Performed by DANIEL ELIZABETH            Total time spent with patient:  xxx   min. Care Plan discussed with:  Patient     Family      RN      Consulting Physician [facility],         I have reviewed the flowsheets. Chart and Pertinent Notes have been reviewed. No change in PMH ,family and social history from Consult note.       Donald Jamison MD

## 2019-07-15 NOTE — DIABETES MGMT
[facility]    D[facility] Consult Note    Recommendations/ Comments: Pt currently on Lantus in house - he denies ever having been on insulin in the past. Per review of chart, pt had previous A1c of 11% (external?). Pt states he has never known A1c to be above 6.5% and had always been told her was \"borderline\". BG fairly stable, ranging from 112-162 mg/dl over the past 48 hours. Pt to discharge tomorrow, will follow up to provide meter (he states he had one in past but unsure where it is) and provide review of technique. Pt feels he would be unable to make it to our outpatient clinic soon after discharge but states he can call when he feels he's ready to follow up for education. Current hospital DM medication: Lantus, 10 units daily  Lispro correction scale     Consult received for:  [x]             Assessment of home management                []      Medication Recommendations                []             Meter/monitoring     []             Insulin instruction     []             New diagnosis     []             Outpatient education     []             Insulin pump patient     []             Insulin infusion     []             DKA/HHS    Chart reviewed and initial evaluation complete on David Rascon. Patient is a 66 y.o. male with known DM on Metformin, 500 mg, taken at dinner at home. Pt states he is compliant with this regimen. Of note, pt also tells me he is taking Contrave and has had a 15+ lbs weight loss since initiating this medication. Pt states he is \"neglectful\" when it comes to BG checks. Reports he has not monitored in a while but has experience. Does not currently know where to locate his meter. Assessed and instructed patient on the following:   ·  interpretation of lab results, blood sugar goals, complications of diabetes mellitus, hypoglycemia prevention and treatment, exercise and nutrition    A1c and BG targets were discussed.  Explained pt's current A1c is indicative of insulin resistance. Asked pt to begin monitoring BG at least once daily and discussed time frames. Pt told me he is worried about the new CKD III dx. Discussed how this can be a complication of uncontrolled BG and more reason to keep up with his diabetes mgmt at discharge. Reviewed s/sx of high and low BG and appropriate treatment. Benefits of activity for BG control reviewed. Basic nutr concepts reviewed. Pt states he does not drink sweet drinks and just recently started drinking diet sodas again, reports Contrave has helped with impulse and overeating. Reports he used to eat non-stop but medicine has helped cue when he is full. Now eats when hungry and mostly eats frozen meals with protein and starch. Snacks on nuts, low fat yoguts and fruit at work. Encouraged the following:   · increased exercise as tolerated, dietary modifications: plate method, lower sodium, increase NS vegetable consumption, continue avoidance of SSB, regular blood sugar monitorin time daily    Provided patient with the following: [x]             Diabetes Self Care Guide               []             Insulin education materials               []             CHO counting education materials               [x]             Outpatient DTC contact number               []             Glucometer                 Discussed with patient and/or family need for follow up appointment for diabetes management after discharge. A1c:   Lab Results   Component Value Date/Time    Hemoglobin A1c 6.7 (H) 2019 03:05 PM       Recent Glucose Results:   Lab Results   Component Value Date/Time     (H) 07/15/2019 03:23 AM    GLUCPOC 162 (H) 07/15/2019 11:09 AM    GLUCPOC 136 (H) 07/15/2019 06:23 AM    GLUCPOC 145 (H) 2019 09:13 PM        Lab Results   Component Value Date/Time    Creatinine 1.43 (H) 07/15/2019 03:23 AM     Estimated Creatinine Clearance: 89 mL/min (A) (based on SCr of 1.43 mg/dL (H)).     Active Orders   Diet    DIET DIABETIC CONSISTENT CARB Regular        PO intake:   Patient Vitals for the past 72 hrs:   % Diet Eaten   07/15/19 1508 90 %   07/14/19 1833 100 %   07/14/19 1020 50 %       Will continue to follow as needed. Thank you.   Destinee Pickard RD, Diabetes Clinician      Time spent: 20 minutes

## 2019-07-15 NOTE — WOUND CARE
Wound Consult:  New Patient Visit. Chart reviewed. Consulted for lower leg cellulitis/wound - patient has chronic wound on left anterior lower leg for which is is followed in wound center at [facility]; they also place compression wraps; he has had cellulitis in past as well in both right and left legs. Spoke with patients nurse,  Ck Morales and in with patient who was resting on a total care bed; at conclusion of visit we changed bed out to a total care bariatric plus surface. Assessment: 
Left anterior lower leg - 1.8 x 1. X 0.3 cm, wound was desiccated with dried exudate, hydrated and cleansed to reveal 80% red, base - 20% light tan/yellow - full thickness injury, no odor or purulence. Surrounding skin on leg up through thigh is bright red, warm, edematous. No weeping noted. Right anterior lower leg - chronic area of redness with dry skin. No discoloration to heels. Right upper abdominal wall - 3 x 2 x 0.1 cm, area of skin injury; pale white/yellow to tan non-viable tissue, pink blanching edge. It looks most like thermal injury; patient tells me he was using heating pad prior to admission and possibly could have resulted in burn. Buttocks/sacrum intact, not small bruised area on right inner buttock. Treatment: 
Washed and moisturized both legs. Medihoney alginate applied to both left lower leg and right upper abdominal wound - covered with Mepilex Border dressings. Wound / Skin Recommendations: 
Apply Medihoney alginate to both left lower leg and right upper abdominal wounds - cover with Mepilex Border dressings; change every three days, cleanse with saline. Moisturize lower legs with aloe vesta ointment daily. Continue to monitor nutrition, pain, and skin risk scale, and skin assessment. Plan: 
Call out to MD to discuss. We will continue to reassess routinely and as needed. Jamarcus Bro RN,[facility] Office [phone] Pager [phone]

## 2019-07-15 NOTE — PROGRESS NOTES
TRANSITIONS OF CARE PLAN:   1. DESTINATION: Likely own home  2. TRANSPORT: Boss for discharge; [place] at base  3. ADDITIONAL SUPPORT: Brother  4. DME: CPAP, cane, glucometer  5. HOME HEALTH: At minimum [facility] visit, which patient agrees to  6. CODE STATUS/AMD STATUS: FULL CODE; not on file  7. FOLLOW UP APPOINTMENTS: PCP, likely nephrology, potentially pulmonary   8. STILL NEEDS: PT consult, OT consult,     Reason for Admission:   Sepsis                   RRAT Score:          0           Plan for utilizing home health: At [facility], which patient agreed to. CM to await PT and OT consults for final recommendation prior submitting referral.                    Current Advanced Directive/Advance Care Plan: FULL CODE; not on file                         Transition of Care Plan:   Patient has no hx of HH, IPR, or SNF. Pharmacy preference is [facility]. Patient has had PT, OT, and RD consults placed. Patient started on contrave in May in an attempt to start losing weight. Patient expressed concern about ability to care for self due to his weight. With the medication, patient reports that he has lost about 15 pounds in 4 weeks. With the start of the year, patient weighed 425 pounds, lost 5 pounds on own, and with 6/14 PCP appointment was at 405 pounds. Patient identified having compulsive eating tendencies. Patient has had circulation issues in the right leg, but the left leg issues began in April/May. Care Management Interventions  PCP Verified by CM: Yes(followed by Dr. Camille Ramírez)  Last Visit to PCP: 06/14/19  Palliative Care Criteria Met (RRAT>21 & CHF Dx)?: No  Mode of Transport at Discharge:  Other (see comment)(boss will transport patient)  Transition of Care Consult (CM Consult): Discharge Planning  MyChart Signup: No  Discharge Durable Medical Equipment: No  Health Maintenance Reviewed: Yes  Physical Therapy Consult: Yes  Occupational Therapy Consult: Yes  Speech Therapy Consult: No  Current Support Network: Lives Alone, Family Lives [place], Own Home  Confirm Follow Up Transport: Self(independent in ADLs to include driving and living alone)  Plan discussed with Pt/Family/Caregiver: Yes  Freedom of Choice Offered: Yes(In agreement to [facility] visit)  [address] Provided?: No  Discharge Location  Discharge Placement: Home with home health(lives alone in a single story, first floor apartment with 3 exterior steps down to the apartment)    CRM: Dyana Benitez, MPH, [address] [place] [address]; Z: [phone]

## 2019-07-16 LAB
ANION GAP SERPL CALC-SCNC: 7 MMOL/L (ref 5–15)
BASOPHILS # BLD: 0 K/UL (ref 0–0.1)
BASOPHILS NFR BLD: 0 % (ref 0–1)
BUN SERPL-MCNC: 17 MG/DL (ref 6–20)
BUN/CREAT SERPL: 13 (ref 12–20)
CALCIUM SERPL-MCNC: 8.2 MG/DL (ref 8.5–10.1)
CHLORIDE SERPL-SCNC: 108 MMOL/L (ref 97–108)
CO2 SERPL-SCNC: 23 MMOL/L (ref 21–32)
CREAT SERPL-MCNC: 1.33 MG/DL (ref 0.7–1.3)
DATE LAST DOSE: ABNORMAL
DIFFERENTIAL METHOD BLD: ABNORMAL
EOSINOPHIL # BLD: 0 K/UL (ref 0–0.4)
EOSINOPHIL NFR BLD: 0 % (ref 0–7)
ERYTHROCYTE [DISTWIDTH] IN BLOOD BY AUTOMATED COUNT: 13.1 % (ref 11.5–14.5)
GLUCOSE BLD STRIP.AUTO-MCNC: 137 MG/DL (ref 65–100)
GLUCOSE BLD STRIP.AUTO-MCNC: 145 MG/DL (ref 65–100)
GLUCOSE BLD STRIP.AUTO-MCNC: 174 MG/DL (ref 65–100)
GLUCOSE SERPL-MCNC: 127 MG/DL (ref 65–100)
HCT VFR BLD AUTO: 31.7 % (ref 36.6–50.3)
HGB BLD-MCNC: 10.4 G/DL (ref 12.1–17)
IMM GRANULOCYTES # BLD AUTO: 0 K/UL
IMM GRANULOCYTES NFR BLD AUTO: 0 %
LYMPHOCYTES # BLD: 1.7 K/UL (ref 0.8–3.5)
LYMPHOCYTES NFR BLD: 13 % (ref 12–49)
MCH RBC QN AUTO: 29.9 PG (ref 26–34)
MCHC RBC AUTO-ENTMCNC: 32.8 G/DL (ref 30–36.5)
MCV RBC AUTO: 91.1 FL (ref 80–99)
METAMYELOCYTES NFR BLD MANUAL: 1 %
MONOCYTES # BLD: 1.2 K/UL (ref 0–1)
MONOCYTES NFR BLD: 9 % (ref 5–13)
MYELOCYTES NFR BLD MANUAL: 2 %
NEUTS BAND NFR BLD MANUAL: 3 % (ref 0–6)
NEUTS SEG # BLD: 9.9 K/UL (ref 1.8–8)
NEUTS SEG NFR BLD: 72 % (ref 32–75)
NRBC # BLD: 0.02 K/UL (ref 0–0.01)
NRBC BLD-RTO: 0.2 PER 100 WBC
PLATELET # BLD AUTO: 280 K/UL (ref 150–400)
PLATELET COMMENTS,PCOM: ABNORMAL
POTASSIUM SERPL-SCNC: 3.5 MMOL/L (ref 3.5–5.1)
RBC # BLD AUTO: 3.48 M/UL (ref 4.1–5.7)
RBC MORPH BLD: ABNORMAL
REPORTED DOSE,DOSE: ABNORMAL UNITS
REPORTED DOSE/TIME,TMG: ABNORMAL
SERVICE CMNT-IMP: ABNORMAL
SODIUM SERPL-SCNC: 138 MMOL/L (ref 136–145)
VANCOMYCIN TROUGH SERPL-MCNC: 10.2 UG/ML (ref 5–10)
WBC # BLD AUTO: 13.2 K/UL (ref 4.1–11.1)

## 2019-07-16 PROCEDURE — 94761 N-INVAS EAR/PLS OXIMETRY MLT: CPT

## 2019-07-16 PROCEDURE — 74011636637 HC RX REV CODE- 636/637: Performed by: FAMILY MEDICINE

## 2019-07-16 PROCEDURE — 65270000032 HC RM SEMIPRIVATE

## 2019-07-16 PROCEDURE — 97530 THERAPEUTIC ACTIVITIES: CPT

## 2019-07-16 PROCEDURE — 85025 COMPLETE CBC W/AUTO DIFF WBC: CPT

## 2019-07-16 PROCEDURE — 97165 OT EVAL LOW COMPLEX 30 MIN: CPT

## 2019-07-16 PROCEDURE — 97116 GAIT TRAINING THERAPY: CPT

## 2019-07-16 PROCEDURE — 80202 ASSAY OF VANCOMYCIN: CPT

## 2019-07-16 PROCEDURE — 74011000258 HC RX REV CODE- 258: Performed by: FAMILY MEDICINE

## 2019-07-16 PROCEDURE — 97535 SELF CARE MNGMENT TRAINING: CPT

## 2019-07-16 PROCEDURE — 97161 PT EVAL LOW COMPLEX 20 MIN: CPT

## 2019-07-16 PROCEDURE — 74011250636 HC RX REV CODE- 250/636: Performed by: FAMILY MEDICINE

## 2019-07-16 PROCEDURE — 74011000250 HC RX REV CODE- 250: Performed by: NURSE PRACTITIONER

## 2019-07-16 PROCEDURE — 94640 AIRWAY INHALATION TREATMENT: CPT

## 2019-07-16 PROCEDURE — 82962 GLUCOSE BLOOD TEST: CPT

## 2019-07-16 PROCEDURE — 74011250636 HC RX REV CODE- 250/636: Performed by: HOSPITALIST

## 2019-07-16 PROCEDURE — 80048 BASIC METABOLIC PNL TOTAL CA: CPT

## 2019-07-16 PROCEDURE — 74011250637 HC RX REV CODE- 250/637: Performed by: INTERNAL MEDICINE

## 2019-07-16 PROCEDURE — 77030029684 HC NEB SM VOL KT MONA -A

## 2019-07-16 PROCEDURE — 36415 COLL VENOUS BLD VENIPUNCTURE: CPT

## 2019-07-16 RX ORDER — VANCOMYCIN 2 GRAM/500 ML IN 0.9 % SODIUM CHLORIDE INTRAVENOUS
2000
Status: DISCONTINUED | OUTPATIENT
Start: 2019-07-17 | End: 2019-07-16

## 2019-07-16 RX ADMIN — INSULIN LISPRO 2 UNITS: 100 INJECTION, SOLUTION INTRAVENOUS; SUBCUTANEOUS at 13:04

## 2019-07-16 RX ADMIN — VANCOMYCIN HYDROCHLORIDE 2000 MG: 10 INJECTION, POWDER, LYOPHILIZED, FOR SOLUTION INTRAVENOUS at 07:43

## 2019-07-16 RX ADMIN — INSULIN LISPRO 2 UNITS: 100 INJECTION, SOLUTION INTRAVENOUS; SUBCUTANEOUS at 17:42

## 2019-07-16 RX ADMIN — IPRATROPIUM BROMIDE AND ALBUTEROL SULFATE 3 ML: .5; 3 SOLUTION RESPIRATORY (INHALATION) at 09:28

## 2019-07-16 RX ADMIN — LISINOPRIL 10 MG: 10 TABLET ORAL at 08:42

## 2019-07-16 RX ADMIN — POTASSIUM CHLORIDE 20 MEQ: 750 TABLET, EXTENDED RELEASE ORAL at 08:42

## 2019-07-16 RX ADMIN — IPRATROPIUM BROMIDE AND ALBUTEROL SULFATE 3 ML: .5; 3 SOLUTION RESPIRATORY (INHALATION) at 15:18

## 2019-07-16 RX ADMIN — Medication 10 ML: at 07:43

## 2019-07-16 RX ADMIN — PIPERACILLIN AND TAZOBACTAM 3.38 G: 3; .375 INJECTION, POWDER, FOR SOLUTION INTRAVENOUS at 17:42

## 2019-07-16 RX ADMIN — AMLODIPINE BESYLATE 5 MG: 5 TABLET ORAL at 08:42

## 2019-07-16 RX ADMIN — PIPERACILLIN AND TAZOBACTAM 3.38 G: 3; .375 INJECTION, POWDER, FOR SOLUTION INTRAVENOUS at 08:42

## 2019-07-16 RX ADMIN — PIPERACILLIN AND TAZOBACTAM 3.38 G: 3; .375 INJECTION, POWDER, FOR SOLUTION INTRAVENOUS at 02:50

## 2019-07-16 RX ADMIN — HEPARIN SODIUM 5000 UNITS: 5000 INJECTION INTRAVENOUS; SUBCUTANEOUS at 16:04

## 2019-07-16 RX ADMIN — HEPARIN SODIUM 5000 UNITS: 5000 INJECTION INTRAVENOUS; SUBCUTANEOUS at 07:43

## 2019-07-16 RX ADMIN — Medication 10 ML: at 22:00

## 2019-07-16 NOTE — PROGRESS NOTES
Pharmacist Note - Vancomycin Dosing  Therapy day 4  Indication:  Sepsis likely 2/2 cellulitis  Current regimen:  2000 mg IV Q 18 hr    A Trough Level resulted at 10.2 mcg/mL which was obtained 17.5 hrs post-dose. The extrapolated \"true\" trough is approximately 9.91 mcg/mL based on the patient's known kinetics. Goal trough: 10 - 15 mcg/mL       Plan: Change to 2000 mg IV Q 16hrs for predicted therapeutic trough. Pharmacy will continue to monitor this patient daily for changes in clinical status and renal function.     Claudia Estrada, PharmD  Clinical Pharmacist, 5th Floor () [id number] [facility] [phone]

## 2019-07-16 NOTE — PROGRESS NOTES
7/16/19 -   ALEXANDR:  - PT consult pending  - OT consult pending  - At minimum eligible for [facility] for Sepsis  - CM holding on [facility] referral, pending therapy recommendations   - DTC following and will see today to provide new glucometer and support education for usage  - Likely discharge today, 7/16, with transport provided by boss to own home    CRM: Hendricks Epley, MPH, [address] [place] [address]; Z: [phone]

## 2019-07-16 NOTE — PROGRESS NOTES
Problem: Mobility Impaired (Adult and Pediatric)  Goal: *Acute Goals and Plan of Care (Insert Text)  Description  Physical Therapy Goals  Initiated 7/16/2019  1. Patient will move from supine to sit and sit to supine  in bed with modified independence within 7 day(s). 2.  Patient will perform sit to stand with modified independence within 7 day(s). 3.  Patient will ambulate with modified independence for 75 feet with the least restrictive device within 7 day(s). 4.  Patient will ascend/descend 1 step with walker with modified independence within 7 day(s). Outcome: Progressing Towards Goal   PHYSICAL THERAPY EVALUATION  Patient: Gavin Duran (61 y.o. male)  Date: 7/16/2019  Primary Diagnosis: Sepsis ([facility] [address] [A41.9]        Precautions:   Fall, Skin    ASSESSMENT :   Based on the objective data described below, patient presents with up to Contact guard assistance overall for functional mobility relying on bed rails for transfers and additional time. Gait training completed at Contact guard assistance, 30 feet and using a bariatric rolling walker with slow, antalgic gait. Pt reports moderate left foot pain and his activity is very limited due to pain, poor endurance, ESCAMILLA, and tachycardic with minimal ambulation. HR as high as 120 and SPO2 90% on RA. Pt reports this is his baseline endurance level. The following are barriers to independence while in acute care:   -Cognitive and/or behavioral: none   -Medical condition: ROM, functional endurance, cardiopulmonary tolerance, pain tolerance, girth and medical history    -Other:   morbid obesity, lives alone    The patient will benefit from skilled acute intervention to address the above impairments and their rehabilitation potential is considered to be Good    Discharge recommendations: Home health (to increase independence and safety)    Patient's barriers to discharging home, in addition to above impairments: lives alone.  Pt plans to hire someone to assist him at home through an mg ([employer]). Equipment recommendations for successful discharge (if) home: pt ordered a standard rolling walker on [employer], recommended that he purchase a bariatric rolling walker, possibly a bed rail       PLAN :  Recommendations and Planned Interventions: bed mobility training, transfer training and gait training      Frequency/Duration: Patient will be followed by physical therapy  5 times a week to address goals. SUBJECTIVE:   Patient stated ? My strength is fine. ?    OBJECTIVE DATA SUMMARY:   HISTORY:    Past Medical History:   Diagnosis Date    Arthritis     Asthma     Diabetes ([facility] [address]     Hypertension     Morbid obesity ([facility] [address]     Sleep apnea      Past Surgical History:   Procedure Laterality Date    HX HEENT       Prior Level of Function/Home Situation: PTA independent thought limited endurance. Pt has been working and plans to work from home at this time. Personal factors and/or comorbidities impacting plan of care: morbid obesity, decreased endurance, lives alone, medical history including episodes of cellulitis on [address]: Apartment  # Steps to Enter: 3, will be able to place walker on each step as they are deep   Rails to Enter: Yes  Hand Rails : Left  One/Two Story Residence: One story  Living Alone: Yes  Support Systems: Friends \ neighbors, Home care staff  Patient Expects to be Discharged to[de-identified] Apartment  Current DME Used/Available at Home: Cane, straight  Tub or Shower Type: Tub/Shower combination    EXAMINATION/PRESENTATION/DECISION MAKING:   Critical Behavior:  Neurologic State: Alert, Appropriate for age  Orientation Level: Oriented X4  Cognition: Appropriate decision making, Appropriate for age attention/concentration, Follows commands  Safety/Judgement: Insight into deficits  Hearing:   Auditory  Auditory Impairment: None  Skin: cellulitis LLE  Edema: edematous LLE  Range Of Motion:  AROM: Generally decreased, functional, BUE functional, BLE limited due to body habitus                        Strength:    Strength: Within functional limits                    Tone & Sensation:   Tone: Normal              Sensation: Intact               Coordination:  Coordination: Within functional limits       Functional Mobility:  Bed Mobility:     Supine to Sit: Supervision; Adaptive equipment; Additional time  Sit to Supine: Supervision; Adaptive equipment; Additional time     Transfers:  Sit to Stand: Contact guard assistance;Assist x2; Additional time; Adaptive equipment  Stand to Sit: Supervision;Assist x1                       Balance:   Sitting: Intact  Standing: Intact; With support  Ambulation/Gait Training:  Distance (ft): 30 Feet (ft)  Assistive Device: Walker, rolling;Gait belt(bariatric walker)  Ambulation - Level of Assistance: Contact guard assistance        Gait Abnormalities: Antalgic;Decreased step clearance, step to gait, cues for gait sequence, +ESCAMILLA, labored gait        Base of Support: Widened  Stance: Left decreased  Speed/Yuliana: Pace decreased (<100 feet/min)  Step Length: Right shortened;Left shortened               Stairs:   Verbally discussed gait sequence on stairs and use of walker           Functional Measure:  Tinetti test:    Sitting Balance: 1  Arises: 1  Attempts to Rise: 1  Immediate Standing Balance: 1  Standing Balance: 1  Nudged: 1  Eyes Closed: 1  Turn 360 Degrees - Continuous/Discontinuous: 1  Turn 360 Degrees - Steady/Unsteady: 1  Sitting Down: 2  Balance Score: 11  Indication of Gait: 1  R Step Length/Height: 1  L Step Length/Height: 1  R Foot Clearance: 1  L Foot Clearance: 1  Step Symmetry: 0  Step Continuity: 0  Path: 1  Trunk: 1  Walking Time: 0  Gait Score: 7  Total Score: 18         Tinetti Tool Score Risk of Falls  <19 = High Fall Risk  19-24 = Moderate Fall Risk  25-28 = Low Fall Risk  Tinetti ME. Performance-Oriented Assessment of Mobility Problems in Elderly Patients. Petersen 66; [id number].  (Scoring Description: PT Bulletin Feb. 10, 1993)    Older adults: Dori Ormond et al, 2009; n = 1000 [facility] elderly evaluated with ABC, HERIBERTO, ADL, and IADL)  · Mean HERIBERTO score for males aged 69-68 years = 26.21(3.40)  · Mean HERIBERTO score for females age 69-68 years = 25.16(4.30)  · Mean HERIBERTO score for males over 80 years = 23.29(6.02)  · Mean HERIBERTO score for females over 80 years = 17.20(8.32)            Physical Therapy Evaluation Charge Determination   History Examination Presentation Decision-Making   MEDIUM  Complexity : 1-2 comorbidities / personal factors will impact the outcome/ POC  MEDIUM Complexity : 3 Standardized tests and measures addressing body structure, function, activity limitation and / or participation in recreation  LOW Complexity : Stable, uncomplicated  LOW Complexity : FOTO score of       Based on the above components, the patient evaluation is determined to be of the following complexity level: LOW     Pain:  During treatment: 5/10  Location: left foot    Description:aching and burning   Aggravating factors: ambulation     Activity Tolerance:   Fair    After treatment patient left:   Supine in bed  Call light within reach     COMMUNICATION/EDUCATION:   The patient?s plan of care was discussed with: Registered Nurse. Fall prevention education was provided and the patient/caregiver indicated understanding. and Patient/family agree to work toward stated goals and plan of care.     Thank you for this referral.  Mar Silva Rater   Time Calculation: 35 mins

## 2019-07-16 NOTE — DIABETES MGMT
[facility]    DTC Progress Note    Recommendations/ Comments: Followed up with pt for meter instruction. He was provide [employer] Guide meter with review of technique. Pt able to do return demo with meter. Random BG of 183 mg/dl noted with pt having eaten breakfast <2 hours ago. Pt continues on Lantus 10 units with noted stable BG control. Will reach out to following provider to obtain meter scripts for discharge and to see if insulin instruction may be warranted. Pt will require script for accu chek guide test strips and fast clix lancets. Dr. Junie Ibrahim regarding script needs. Pt currently with infx on IV abx but most recent A1c indicates good control. Pt only on Metformin PTA. Will continue to follow. Current hospital DM medication: Lantus, 10 units daily  Lispro correction scale - normal sensitivity    Chart reviewed on [address] Patient is a 66 y.o. male with knonw DM on 500 mg Metformin with dinner PTA. Per chart review, noted pt had prior A1c of 11% (external?) but per interview 7/15/19, pt reports he was told he has only ever been \"borderline\". A1c:   Lab Results   Component Value Date/Time    Hemoglobin A1c 6.7 (H) 07/13/2019 03:05 PM       Recent Glucose Results:   Lab Results   Component Value Date/Time     (H) 07/16/2019 07:45 AM    GLUCPOC 137 (H) 07/16/2019 07:30 AM    GLUCPOC 145 (H) 07/15/2019 09:31 PM    GLUCPOC 143 (H) 07/15/2019 04:40 PM        Lab Results   Component Value Date/Time    Creatinine 1.33 (H) 07/16/2019 07:45 AM     Estimated Creatinine Clearance: 95.7 mL/min (A) (based on SCr of 1.33 mg/dL (H)). Active Orders   Diet    DIET DIABETIC CONSISTENT CARB Regular        PO intake:   Patient Vitals for the past 72 hrs:   % Diet Eaten   07/15/19 1809 100 %   07/15/19 1508 90 %   07/15/19 0916 95 %   07/14/19 1833 100 %   07/14/19 1020 50 %       Will continue to follow as needed.     Thank you  Mickie Her RD, Diabetes Clinician        Time spent: 12 minutes

## 2019-07-16 NOTE — PROGRESS NOTES
Bedside shift change report given to Pat Guillaume (oncoming nurse) by Nguyen Larsen (offgoing nurse). Report included the following information SBAR, Kardex, Procedure Summary, Intake/Output and MAR.

## 2019-07-16 NOTE — PROGRESS NOTES
[facility]   [address] [facility], [address] [place] [address], [facility]  Phone: [phone]   [phone]       Nephrology Progress Note  Vita Paulino     1955     [id number]  Date of Admission : 7/12/2019 07/16/19    CC:  Follow up for CYNDI       Assessment and Plan   CYNDI   -  presumed CYNDI, no recent baseline; volume depletion from outpatient diuretic, ACEi  and Meloxicam the most likely causes; infection possible; rule out obstruction  - no obstruction on imagining   - Cr down to 1.3  - continue Lisinopril and lasix on d/c     CKD - ?? baseline     Chronic venous insufficiency/dermatitis/wounds  -treated at wound care center     HTN, relative hypotension     DM     Obesity, JOSUÉ     Interval History:    No complaints   Cr down   Good UOP     Review of Systems: Pertinent items are noted in HPI.     Current Medications:   Current Facility-Administered Medications   Medication Dose Route Frequency    [START ON 7/17/2019] vancomycin (VANCOCIN) 2000 mg in  ml infusion  2,000 mg IntraVENous Q16H    potassium chloride SR (KLOR-CON 10) tablet 20 mEq  20 mEq Oral DAILY    amLODIPine (NORVASC) tablet 5 mg  5 mg Oral DAILY    furosemide (LASIX) tablet 20 mg  20 mg Oral BID    lisinopril (PRINIVIL, ZESTRIL) tablet 10 mg  10 mg Oral DAILY    sodium chloride (NS) flush 5-10 mL  5-10 mL IntraVENous PRN    acetaminophen (TYLENOL) tablet 650 mg  650 mg Oral Q4H PRN    piperacillin-tazobactam (ZOSYN) 3.375 g in 0.9% sodium chloride (MBP/ADV) 100 mL  3.375 g IntraVENous Q8H    sodium chloride (NS) flush 5-40 mL  5-40 mL IntraVENous Q8H    sodium chloride (NS) flush 5-40 mL  5-40 mL IntraVENous PRN    Vancomycin Pharmacy Dosing   Other Rx Dosing/Monitoring    glucose chewable tablet 16 g  4 Tab Oral PRN    dextrose (D50W) injection syrg 12.5-25 g  25-50 mL IntraVENous PRN    glucagon (GLUCAGEN) injection 1 mg  1 mg IntraMUSCular PRN    insulin lispro (HUMALOG) injection   SubCUTAneous AC&HS    heparin (porcine) injection 5,000 Units  5,000 Units SubCUTAneous Q8H    insulin glargine (LANTUS) injection 10 Units  10 Units SubCUTAneous QHS    albuterol-ipratropium (DUO-NEB) 2.5 MG-0.5 MG/3 ML  3 mL Nebulization Q4H PRN      Allergies   Allergen Reactions    Phenobarbital Unknown (comments)       Objective:  Vitals:    Vitals:    07/15/19 1425 07/15/19 1525 07/15/19 2331 07/16/19 0756   BP:  126/66 131/87 128/82   Pulse:  79 74 72   Resp:  18 18 18   Temp:  98.6 °F (37 °C) 98.5 °F (36.9 °C) 98.6 °F (37 °C)   SpO2: 96% 98% 94% 96%   Weight:       Height:         Intake and Output:  07/16 0701 - 07/16 1900  In: -   Out: 500 [Urine:500]  07/14 1901 - 07/16 0700  In: 5950.8 [P.O.:1100; I.V.:4850.8]  Out: 1900 [Urine:1900]    Physical Examination:    General: Obese   Neck:  Supple, no mass  Resp:  CTA  CV:  RRR,  no murmur or rub,chronic LE edema  GI:  Soft, NT, + Bowel sounds, no hepatosplenomegaly  Neurologic:  Non focal  Psych:             AAO x 3 appropriate affect   Skin:  Involving both LE      []    High complexity decision making was performed  []    Patient is at high-risk of decompensation with multiple organ involvement    Lab Data Personally Reviewed: I have reviewed all the pertinent labs, microbiology data and radiology studies during assessment.     Recent Labs     07/16/19 0745 07/15/19  0323 07/14/19  0238    138 136   K 3.5 3.4* 3.5    107 106   CO2 23 22 23   * 112* 140*   BUN 17 23* 29*   CREA 1.33* 1.43* 1.58*   CA 8.2* 8.0* 8.2*     Recent Labs     07/15/19  0323 07/14/19  0238   WBC 14.3* 16.7*   HGB 10.2* 10.9*   HCT 31.1* 33.7*    206     No results found for: SDES  Lab Results   Component Value Date/Time    Culture result: NO GROWTH 2 DAYS 07/13/2019 07:16 AM    Culture result: NO GROWTH 3 DAYS 07/13/2019 01:08 AM    Culture result: NO GROWTH 3 DAYS 07/13/2019 01:08 AM     Recent Results (from the past 24 hour(s))   GLUCOSE, POC    Collection Time: 07/15/19 11:09 AM   Result Value Ref Range    Glucose (POC) 162 (H) 65 - 100 mg/dL    Performed by [address] [place] [address] Drive, POC    Collection Time: 07/15/19  4:40 PM   Result Value Ref Range    Glucose (POC) 143 (H) 65 - 100 mg/dL    Performed by Naye Cevallos    GLUCOSE, POC    Collection Time: 07/15/19  9:31 PM   Result Value Ref Range    Glucose (POC) 145 (H) 65 - 100 mg/dL    Performed by Vic Julian, POC    Collection Time: 07/16/19  7:30 AM   Result Value Ref Range    Glucose (POC) 137 (H) 65 - 100 mg/dL    Performed by Aime Pak    METABOLIC PANEL, BASIC    Collection Time: 07/16/19  7:45 AM   Result Value Ref Range    Sodium 138 136 - 145 mmol/L    Potassium 3.5 3.5 - 5.1 mmol/L    Chloride 108 97 - 108 mmol/L    CO2 23 21 - 32 mmol/L    Anion gap 7 5 - 15 mmol/L    Glucose 127 (H) 65 - 100 mg/dL    BUN 17 6 - 20 MG/DL    Creatinine 1.33 (H) 0.70 - 1.30 MG/DL    BUN/Creatinine ratio 13 12 - 20      GFR est AA >60 >60 ml/min/1.73m2    GFR est non-AA 55 (L) >60 ml/min/1.73m2    Calcium 8.2 (L) 8.5 - 10.1 MG/DL   VANCOMYCIN, TROUGH    Collection Time: 07/16/19  7:45 AM   Result Value Ref Range    Vancomycin,trough 10.2 (H) 5.0 - 10.0 ug/mL    Reported dose date: NOT PROVIDED      Reported dose time: NOT PROVIDED      Reported dose: NOT PROVIDED UNITS           Total time spent with patient:  xxx   min. Care Plan discussed with:  Patient     Family      RN      Consulting Physician Donna Baum I have reviewed the flowsheets. Chart and Pertinent Notes have been reviewed. No change in PMH ,family and social history from Consult note.       Erick Palumbo MD

## 2019-07-16 NOTE — PROGRESS NOTES
Rounded on [employer] patients and provided Anointing of the Sick at request of patient    Fr. Pam Corey

## 2019-07-16 NOTE — PROGRESS NOTES
Problem: Self Care Deficits Care Plan (Adult)  Goal: *Acute Goals and Plan of Care (Insert Text)  Description  Occupational Therapy Goals  Initiated 7/16/2019  1. Patient will perform grooming standing at sink for 5 min without fatigue or LOB with supervision/set-up within 7 day(s). 2.  Patient will perform lower body dressing with supervision/set-up using AE PRN within 7 day(s). 3.  Patient will perform bathing with supervision/set-up within 7 day(s). 4.  Patient will perform toilet transfers with supervision/set-up within 7 day(s). 5.  Patient will perform all aspects of toileting with supervision/set-up using AE PRN within 7 day(s). 6.  Patient will participate in upper extremity therapeutic exercise/activities with supervision/set-up for 10 minutes within 7 day(s). 7.  Patient will utilize energy conservation techniques during functional activities with verbal cues within 7 day(s). Outcome: Progressing Towards Goal    OCCUPATIONAL THERAPY EVALUATION  Patient: David Rascon (43 y.o. male)  Date: 7/16/2019  Primary Diagnosis: Sepsis ([facility] [address] [A41.9]        Precautions:  Fall, Skin    ASSESSMENT :  Based on the objective data described below, patient presents with Setup upper body ADLs, Contact guard assistance lower body ADLs, and Contact guard assistance functional mobility b/l bed rails for bed mobility and bariatric RW for ambulating ~30 ft. The following are barriers to ADL independence while in acute care:   - Cognitive and/or behavioral: none  - Medical condition: functional endurance, cardiopulmonary tolerance and girth.   Patient with significant ESCAMILLA after ambulating ~30 ft ( & SPO2 91% on RA, improving to HR 91 & SPO2 94% after ~5 minutes seated/ supine rest)      Prior level of function: Independent/ mod-I with ADLs/ IADLs, ambulatory with SPC, working as , living alone in apartment     PLAN :  Recommendations and Planned Interventions: self care training, functional mobility training, therapeutic exercise, therapeutic activities, endurance activities, patient education, home safety training and family training/education    Frequency/Duration: Patient will be followed by occupational therapy 5 times a week to address goals. Patient will benefit from skilled acute intervention to address the above impairments. Patient?s rehabilitation potential is considered to be Good    Discharge recommendations: home health OT pending progress/ functional endurance/ activity tolerance. SUBJECTIVE:   Patient stated ? This is normal for me. ? referring to ESCAMILLA    OBJECTIVE DATA SUMMARY:   HISTORY:   Past Medical History:   Diagnosis Date    Arthritis     Asthma     Diabetes ([facility] [address]     Hypertension     Morbid obesity ([facility] [address]     Sleep apnea      Past Surgical History:   Procedure Laterality Date    HX HEENT       Expanded or extensive additional review of patient history:     Home Situation  Home Environment: Apartment  # Steps to Enter: 3  Rails to Enter: Yes  Hand Rails : Left  One/Two Story Residence: One story  Living Alone: Yes  Support Systems: Friends \ neighbors, Home care staff  Patient Expects to be Discharged to[de-identified] Apartment  Current DME Used/Available at Home: Cane, straight, Adaptive dressing aides(also has ordered RW)  Tub or Shower Type: Tub/Shower combination    EXAMINATION OF PERFORMANCE DEFICITS:  Cognitive/Behavioral Status:  Neurologic State: Alert; Appropriate for age  Orientation Level: Oriented X4  Cognition: Appropriate decision making; Appropriate for age attention/concentration; Follows commands  Perception: Appears intact  Perseveration: No perseveration noted  Safety/Judgement: Insight into deficits    Skin: LLE cellulitis    Edema: moderate BLE L>R    Hearing:   Auditory  Auditory Impairment: None    Vision/Perceptual:                           Acuity: Within Defined Limits         Range of Motion:    AROM: Generally decreased, functional Strength:    Strength: Within functional limits                Coordination:  Coordination: Within functional limits  Fine Motor Skills-Upper: Left Intact; Right Intact    Gross Motor Skills-Upper: Left Intact; Right Intact    Tone & Sensation:    Tone: Normal  Sensation: Intact                      Balance:  Sitting: Intact  Standing: Intact; With support    Functional Mobility and Transfers for ADLs:  Bed Mobility:  Supine to Sit: Supervision; Adaptive equipment; Additional time  Sit to Supine: Supervision; Adaptive equipment; Additional time    Transfers:  Sit to Stand: Contact guard assistance;Assist x2; Additional time; Adaptive equipment  Stand to Sit: Supervision;Assist x1    ADL Assessment:  Feeding: Independent(inferred)    Oral Facial Hygiene/Grooming: Setup(infererd)    Bathing: Contact guard assistance(inferred)    Upper Body Dressing: Setup(inferred)    Lower Body Dressing: Contact guard assistance(inferred, uses AE at home)    Toileting: Contact guard assistance(inferre)                ADL Intervention and task modifications:                                     Cognitive Retraining  Safety/Judgement: Insight into deficits      Functional Measure:  Barthel Index:    Bathin  Bladder: 10  Bowels: 10  Groomin  Dressin  Feeding: 10  Mobility: 0  Stairs: 0  Toilet Use: 5  Transfer (Bed to Chair and Back): 10  Total: 55/100        Percentage of impairment   0%   1-19%   20-39%   40-59%   60-79%   80-99%   100%   Barthel Score 0-100 100 99-80 79-60 59-40 20-39 1-19   0     The Barthel ADL Index: Guidelines  1. The index should be used as a record of what a patient does, not as a record of what a patient could do. 2. The main aim is to establish degree of independence from any help, physical or verbal, however minor and for whatever reason. 3. The need for supervision renders the patient not independent. 4. A patient's performance should be established using the best available evidence.  Asking the patient, friends/relatives and nurses are the usual sources, but direct observation and common sense are also important. However direct testing is not needed. 5. Usually the patient's performance over the preceding 24-48 hours is important, but occasionally longer periods will be relevant. 6. Middle categories imply that the patient supplies over 50 per cent of the effort. 7. Use of aids to be independent is allowed. Coleta Foot., Barthel, D.W. (9142). Functional evaluation: the Barthel Index. [address] [facility] (14)2. ANTONINO Nguyen, Gris Rodriguez., Roxanne Enriquez., [place], [address] (1999). Measuring the change indisability after inpatient rehabilitation; comparison of the responsiveness of the Barthel Index and Functional [place] Measure. Journal of Neurology, Neurosurgery, and Psychiatry, 66(4), 812-023. WEI Dueñas, TROY Kramer, & Nalini Aly, M.A. (2004.) Assessment of post-stroke quality of life in cost-effectiveness studies: The usefulness of the Barthel Index and the EuroQoL-5D. Quality of Life Research, 15, 043-14         Occupational Therapy Evaluation Charge Determination   History Examination Decision-Making   LOW Complexity : Brief history review  MEDIUM Complexity : 3-5 performance deficits relating to physical, cognitive , or psychosocial skils that result in activity limitations and / or participation restrictions MEDIUM Complexity : Patient may present with comorbidities that affect occupational performnce.  Miniml to moderate modification of tasks or assistance (eg, physical or verbal ) with assesment(s) is necessary to enable patient to complete evaluation       Based on the above components, the patient evaluation is determined to be of the following complexity level: LOW   Pain:  Patient reports 4/10 LLE pain with mobility    Activity Tolerance:   Very ESCAMILLA,  & SPO2 91% on RA, improving to HR 91 & SPO2 94% after ~5 minutes seated/ supine rest.    After treatment patient left:   Supine in bed   COMMUNICATION/EDUCATION:   The patient?s plan of care was discussed with: Physical Therapist and Registered Nurse. Home safety education was provided and the patient/caregiver indicated understanding., Patient/family have participated as able in goal setting and plan of care. and Patient/family agree to work toward stated goals and plan of care. This patient?s plan of care is appropriate for delegation to A[facility]IS.     Thank you for this referral.  Marina Crawford OT  Time Calculation: 32 mins

## 2019-07-16 NOTE — PROGRESS NOTES
Hospitalist Progress Note      Hospital summary: A 72-year-old white male with past medical history of arthritis, asthma, type 2 diabetes mellitus, hypertension, morbid obesity, sleep apnea, who presented as a direct admission/transfer from [place] with complaint of redness, swelling, and pain of both the legs, left greater than right. 7/12/2019      Assessment/Plan:  Sepsis 2/2 left lower extremity cellulitis  - hx chronic venous stasis changes with edema  -  lactic acidosis on POA- resolved  - leucocytosis 23.3 poa improving 13.2   - blood cx: NGTD  - wound cx were not sent  - dced vanco 7/16. C/w iv zosyn  - wound care  - patient left leg still erythematous  - ID consult placed    Acute metabolic encephalopathy resolved    CYNDI - improving  - nephrology on board  - cr unknown baseline  - nephro ok with lasix and lisinopril     DM - A1c 6.7. C/w iss. Metformin on hold   HTN - c/w amlodipine, lisinopril   JOSUÉ on CPAP    Morbid Obesity Body mass index is 62.78 kg/m². weight loss recommended     Code status: Full  DVT prophylaxis: heparin   Disposition: TBD. Home with [facility] when ready   ----------------------------------------------    CC: Leg cellulitis    S: Patient is seen and examined at bedside. Feeling ok. Still has leg erythema. Denied fever or chills. Review of Systems:  A comprehensive review of systems was negative. O:  Visit Vitals  /82   Pulse 72   Temp 98.6 °F (37 °C)   Resp 18   Ht 5' 8\" (1.727 m)   Wt (!) 187.3 kg (412 lb 14.8 oz)   SpO2 96%   BMI 62.78 kg/m²       PHYSICAL EXAM:  Gen: NAD, obese   HEENT: anicteric sclerae, normal conjunctiva, oropharynx clear, MM moist  Neck: supple, trachea midline, no adenopathy  Heart: RRR, no MRG, no JVD, no peripheral edema  Lungs: CTA b/l, non-labored respirations  Abd: soft, NT, ND, BS+, no organomegaly  Extr: bilateral LE swelling.  Left leg erythematous and tender  Skin: dry, no rash  Neuro: CN II-XII grossly intact, normal speech, moves all extremities  Psych: normal mood, appropriate affect      Intake/Output Summary (Last 24 hours) at 7/16/2019 1408  Last data filed at 7/16/2019 9852  Gross per 24 hour   Intake 900 ml   Output 900 ml   Net 0 ml        Recent labs & imaging reviewed:  Recent Results (from the past 24 hour(s))   GLUCOSE, POC    Collection Time: 07/15/19  4:40 PM   Result Value Ref Range    Glucose (POC) 143 (H) 65 - 100 mg/dL    Performed by Mechelle Love    GLUCOSE, POC    Collection Time: 07/15/19  9:31 PM   Result Value Ref Range    Glucose (POC) 145 (H) 65 - 100 mg/dL    Performed by Vic Julian, POC    Collection Time: 07/16/19  7:30 AM   Result Value Ref Range    Glucose (POC) 137 (H) 65 - 100 mg/dL    Performed by Nicki Kohler    METABOLIC PANEL, BASIC    Collection Time: 07/16/19  7:45 AM   Result Value Ref Range    Sodium 138 136 - 145 mmol/L    Potassium 3.5 3.5 - 5.1 mmol/L    Chloride 108 97 - 108 mmol/L    CO2 23 21 - 32 mmol/L    Anion gap 7 5 - 15 mmol/L    Glucose 127 (H) 65 - 100 mg/dL    BUN 17 6 - 20 MG/DL    Creatinine 1.33 (H) 0.70 - 1.30 MG/DL    BUN/Creatinine ratio 13 12 - 20      GFR est AA >60 >60 ml/min/1.73m2    GFR est non-AA 55 (L) >60 ml/min/1.73m2    Calcium 8.2 (L) 8.5 - 10.1 MG/DL   VANCOMYCIN, TROUGH    Collection Time: 07/16/19  7:45 AM   Result Value Ref Range    Vancomycin,trough 10.2 (H) 5.0 - 10.0 ug/mL    Reported dose date: NOT PROVIDED      Reported dose time: NOT PROVIDED      Reported dose: NOT PROVIDED UNITS   CBC WITH AUTOMATED DIFF    Collection Time: 07/16/19  8:55 AM   Result Value Ref Range    WBC 13.2 (H) 4.1 - 11.1 K/uL    RBC 3.48 (L) 4.10 - 5.70 M/uL    HGB 10.4 (L) 12.1 - 17.0 g/dL    HCT 31.7 (L) 36.6 - 50.3 %    MCV 91.1 80.0 - 99.0 FL    MCH 29.9 26.0 - 34.0 PG    MCHC 32.8 30.0 - 36.5 g/dL    RDW 13.1 11.5 - 14.5 %    PLATELET 872 205 - 318 K/uL    NRBC 0.2 (H) 0  WBC    ABSOLUTE NRBC 0.02 (H) 0.00 - 0.01 K/uL    NEUTROPHILS PENDING %    LYMPHOCYTES PENDING %    MONOCYTES PENDING %    EOSINOPHILS PENDING %    BASOPHILS PENDING %    IMMATURE GRANULOCYTES PENDING %    ABS. NEUTROPHILS PENDING K/UL    ABS. LYMPHOCYTES PENDING K/UL    ABS. MONOCYTES PENDING K/UL    ABS. EOSINOPHILS PENDING K/UL    ABS. BASOPHILS PENDING K/UL    ABS. IMM. GRANS. PENDING K/UL    DF PENDING    GLUCOSE, POC    Collection Time: 07/16/19 11:55 AM   Result Value Ref Range    Glucose (POC) 145 (H) 65 - 100 mg/dL    Performed by [place]      Recent Labs     07/16/19  0855 07/15/19  0323   WBC 13.2* 14.3*   HGB 10.4* 10.2*   HCT 31.7* 31.1*    247     Recent Labs     07/16/19  0745 07/15/19  0323 07/14/19  0238    138 136   K 3.5 3.4* 3.5    107 106   CO2 23 22 23   BUN 17 23* 29*   CREA 1.33* 1.43* 1.58*   * 112* 140*   CA 8.2* 8.0* 8.2*     No results for input(s): SGOT, GPT, ALT, AP, TBIL, TBILI, TP, ALB, GLOB, GGT, AML, LPSE in the last 72 hours. No lab exists for component: AMYP, HLPSE  No results for input(s): INR, PTP, APTT in the last 72 hours. No lab exists for component: INREXT   No results for input(s): FE, TIBC, PSAT, FERR in the last 72 hours. No results found for: FOL, RBCF   No results for input(s): PH, PCO2, PO2 in the last 72 hours.   Recent Labs     07/14/19  0238   *     No results found for: CHOL, CHOLX, CHLST, CHOLV, HDL, LDL, LDLC, DLDLP, TGLX, TRIGL, TRIGP, CHHD, CHHDX  Lab Results   Component Value Date/Time    Glucose (POC) 145 (H) 07/16/2019 11:55 AM    Glucose (POC) 137 (H) 07/16/2019 07:30 AM    Glucose (POC) 145 (H) 07/15/2019 09:31 PM    Glucose (POC) 143 (H) 07/15/2019 04:40 PM    Glucose (POC) 162 (H) 07/15/2019 11:09 AM     Lab Results   Component Value Date/Time    Color YELLOW/STRAW 07/13/2019 07:16 AM    Appearance CLOUDY (A) 07/13/2019 07:16 AM    Specific gravity 1.022 07/13/2019 07:16 AM    pH (UA) 5.5 07/13/2019 07:16 AM    Protein 30 (A) 07/13/2019 07:16 AM    Glucose NEGATIVE 07/13/2019 07:16 AM    Ketone TRACE (A) 07/13/2019 07:16 AM    Bilirubin NEGATIVE  07/13/2019 07:16 AM    Urobilinogen 0.2 07/13/2019 07:16 AM    Nitrites NEGATIVE  07/13/2019 07:16 AM    Leukocyte Esterase NEGATIVE  07/13/2019 07:16 AM    Epithelial cells FEW 07/13/2019 07:16 AM    Bacteria 2+ (A) 07/13/2019 07:16 AM    WBC 5-10 07/13/2019 07:16 AM    RBC 0-5 07/13/2019 07:16 AM       Med list reviewed  Current Facility-Administered Medications   Medication Dose Route Frequency    [START ON 7/17/2019] vancomycin (VANCOCIN) 2000 mg in  ml infusion  2,000 mg IntraVENous Q16H    potassium chloride SR (KLOR-CON 10) tablet 20 mEq  20 mEq Oral DAILY    amLODIPine (NORVASC) tablet 5 mg  5 mg Oral DAILY    furosemide (LASIX) tablet 20 mg  20 mg Oral BID    lisinopril (PRINIVIL, ZESTRIL) tablet 10 mg  10 mg Oral DAILY    sodium chloride (NS) flush 5-10 mL  5-10 mL IntraVENous PRN    acetaminophen (TYLENOL) tablet 650 mg  650 mg Oral Q4H PRN    piperacillin-tazobactam (ZOSYN) 3.375 g in 0.9% sodium chloride (MBP/ADV) 100 mL  3.375 g IntraVENous Q8H    sodium chloride (NS) flush 5-40 mL  5-40 mL IntraVENous Q8H    sodium chloride (NS) flush 5-40 mL  5-40 mL IntraVENous PRN    Vancomycin Pharmacy Dosing   Other Rx Dosing/Monitoring    glucose chewable tablet 16 g  4 Tab Oral PRN    dextrose (D50W) injection syrg 12.5-25 g  25-50 mL IntraVENous PRN    glucagon (GLUCAGEN) injection 1 mg  1 mg IntraMUSCular PRN    insulin lispro (HUMALOG) injection   SubCUTAneous AC&HS    heparin (porcine) injection 5,000 Units  5,000 Units SubCUTAneous Q8H    insulin glargine (LANTUS) injection 10 Units  10 Units SubCUTAneous QHS    albuterol-ipratropium (DUO-NEB) 2.5 MG-0.5 MG/3 ML  3 mL Nebulization Q4H PRN       Care Plan discussed with:  Patient/Family and Nurse    Norman Stanley MD  Internal Medicine  Date of Service: 7/16/2019

## 2019-07-17 LAB
ALBUMIN SERPL-MCNC: 1.8 G/DL (ref 3.5–5)
ANION GAP SERPL CALC-SCNC: 7 MMOL/L (ref 5–15)
ANION GAP SERPL CALC-SCNC: 8 MMOL/L (ref 5–15)
BASOPHILS # BLD: 0 K/UL (ref 0–0.1)
BASOPHILS NFR BLD: 0 % (ref 0–1)
BUN SERPL-MCNC: 13 MG/DL (ref 6–20)
BUN SERPL-MCNC: 13 MG/DL (ref 6–20)
BUN/CREAT SERPL: 11 (ref 12–20)
BUN/CREAT SERPL: 11 (ref 12–20)
CALCIUM SERPL-MCNC: 8.1 MG/DL (ref 8.5–10.1)
CALCIUM SERPL-MCNC: 8.2 MG/DL (ref 8.5–10.1)
CHLORIDE SERPL-SCNC: 109 MMOL/L (ref 97–108)
CHLORIDE SERPL-SCNC: 109 MMOL/L (ref 97–108)
CO2 SERPL-SCNC: 21 MMOL/L (ref 21–32)
CO2 SERPL-SCNC: 21 MMOL/L (ref 21–32)
CREAT SERPL-MCNC: 1.2 MG/DL (ref 0.7–1.3)
CREAT SERPL-MCNC: 1.21 MG/DL (ref 0.7–1.3)
DIFFERENTIAL METHOD BLD: ABNORMAL
EOSINOPHIL # BLD: 0.3 K/UL (ref 0–0.4)
EOSINOPHIL NFR BLD: 2 % (ref 0–7)
ERYTHROCYTE [DISTWIDTH] IN BLOOD BY AUTOMATED COUNT: 13.1 % (ref 11.5–14.5)
GLUCOSE BLD STRIP.AUTO-MCNC: 139 MG/DL (ref 65–100)
GLUCOSE BLD STRIP.AUTO-MCNC: 141 MG/DL (ref 65–100)
GLUCOSE BLD STRIP.AUTO-MCNC: 158 MG/DL (ref 65–100)
GLUCOSE BLD STRIP.AUTO-MCNC: 162 MG/DL (ref 65–100)
GLUCOSE SERPL-MCNC: 152 MG/DL (ref 65–100)
GLUCOSE SERPL-MCNC: 153 MG/DL (ref 65–100)
HCT VFR BLD AUTO: 32.1 % (ref 36.6–50.3)
HGB BLD-MCNC: 10.3 G/DL (ref 12.1–17)
IMM GRANULOCYTES # BLD AUTO: 0 K/UL
IMM GRANULOCYTES NFR BLD AUTO: 0 %
LYMPHOCYTES # BLD: 1.4 K/UL (ref 0.8–3.5)
LYMPHOCYTES NFR BLD: 10 % (ref 12–49)
MCH RBC QN AUTO: 29.8 PG (ref 26–34)
MCHC RBC AUTO-ENTMCNC: 32.1 G/DL (ref 30–36.5)
MCV RBC AUTO: 92.8 FL (ref 80–99)
METAMYELOCYTES NFR BLD MANUAL: 2 %
MONOCYTES # BLD: 1.5 K/UL (ref 0–1)
MONOCYTES NFR BLD: 11 % (ref 5–13)
MYELOCYTES NFR BLD MANUAL: 3 %
NEUTS BAND NFR BLD MANUAL: 1 % (ref 0–6)
NEUTS SEG # BLD: 10.1 K/UL (ref 1.8–8)
NEUTS SEG NFR BLD: 71 % (ref 32–75)
NRBC # BLD: 0.02 K/UL (ref 0–0.01)
NRBC BLD-RTO: 0.1 PER 100 WBC
PHOSPHATE SERPL-MCNC: 2.3 MG/DL (ref 2.6–4.7)
PLATELET # BLD AUTO: 392 K/UL (ref 150–400)
PMV BLD AUTO: 11.1 FL (ref 8.9–12.9)
POTASSIUM SERPL-SCNC: 3.9 MMOL/L (ref 3.5–5.1)
POTASSIUM SERPL-SCNC: 3.9 MMOL/L (ref 3.5–5.1)
RBC # BLD AUTO: 3.46 M/UL (ref 4.1–5.7)
RBC MORPH BLD: ABNORMAL
SERVICE CMNT-IMP: ABNORMAL
SODIUM SERPL-SCNC: 137 MMOL/L (ref 136–145)
SODIUM SERPL-SCNC: 138 MMOL/L (ref 136–145)
WBC # BLD AUTO: 14 K/UL (ref 4.1–11.1)

## 2019-07-17 PROCEDURE — 82962 GLUCOSE BLOOD TEST: CPT

## 2019-07-17 PROCEDURE — 74011636637 HC RX REV CODE- 636/637: Performed by: INTERNAL MEDICINE

## 2019-07-17 PROCEDURE — 74011250637 HC RX REV CODE- 250/637: Performed by: INTERNAL MEDICINE

## 2019-07-17 PROCEDURE — 74011000258 HC RX REV CODE- 258: Performed by: FAMILY MEDICINE

## 2019-07-17 PROCEDURE — 85025 COMPLETE CBC W/AUTO DIFF WBC: CPT

## 2019-07-17 PROCEDURE — 65270000032 HC RM SEMIPRIVATE

## 2019-07-17 PROCEDURE — 36415 COLL VENOUS BLD VENIPUNCTURE: CPT

## 2019-07-17 PROCEDURE — 80048 BASIC METABOLIC PNL TOTAL CA: CPT

## 2019-07-17 PROCEDURE — 80069 RENAL FUNCTION PANEL: CPT

## 2019-07-17 PROCEDURE — 74011250636 HC RX REV CODE- 250/636: Performed by: FAMILY MEDICINE

## 2019-07-17 RX ORDER — INSULIN LISPRO 100 [IU]/ML
INJECTION, SOLUTION INTRAVENOUS; SUBCUTANEOUS
Status: DISCONTINUED | OUTPATIENT
Start: 2019-07-17 | End: 2019-07-27 | Stop reason: HOSPADM

## 2019-07-17 RX ORDER — SODIUM,POTASSIUM PHOSPHATES 280-250MG
1 POWDER IN PACKET (EA) ORAL 4 TIMES DAILY
Status: COMPLETED | OUTPATIENT
Start: 2019-07-17 | End: 2019-07-18

## 2019-07-17 RX ADMIN — FUROSEMIDE 20 MG: 20 TABLET ORAL at 09:42

## 2019-07-17 RX ADMIN — HEPARIN SODIUM 5000 UNITS: 5000 INJECTION INTRAVENOUS; SUBCUTANEOUS at 23:32

## 2019-07-17 RX ADMIN — LISINOPRIL 10 MG: 10 TABLET ORAL at 09:41

## 2019-07-17 RX ADMIN — PIPERACILLIN AND TAZOBACTAM 3.38 G: 3; .375 INJECTION, POWDER, FOR SOLUTION INTRAVENOUS at 01:07

## 2019-07-17 RX ADMIN — AMLODIPINE BESYLATE 5 MG: 5 TABLET ORAL at 09:42

## 2019-07-17 RX ADMIN — HEPARIN SODIUM 5000 UNITS: 5000 INJECTION INTRAVENOUS; SUBCUTANEOUS at 00:28

## 2019-07-17 RX ADMIN — HEPARIN SODIUM 5000 UNITS: 5000 INJECTION INTRAVENOUS; SUBCUTANEOUS at 06:43

## 2019-07-17 RX ADMIN — FUROSEMIDE 20 MG: 20 TABLET ORAL at 17:28

## 2019-07-17 RX ADMIN — Medication 10 ML: at 00:29

## 2019-07-17 RX ADMIN — INSULIN LISPRO 3 UNITS: 100 INJECTION, SOLUTION INTRAVENOUS; SUBCUTANEOUS at 17:00

## 2019-07-17 RX ADMIN — PIPERACILLIN AND TAZOBACTAM 3.38 G: 3; .375 INJECTION, POWDER, FOR SOLUTION INTRAVENOUS at 09:42

## 2019-07-17 RX ADMIN — Medication 10 ML: at 22:05

## 2019-07-17 RX ADMIN — HEPARIN SODIUM 5000 UNITS: 5000 INJECTION INTRAVENOUS; SUBCUTANEOUS at 15:53

## 2019-07-17 RX ADMIN — POTASSIUM & SODIUM PHOSPHATES POWDER PACK 280-160-250 MG 1 PACKET: 280-160-250 PACK at 17:27

## 2019-07-17 RX ADMIN — POTASSIUM CHLORIDE 20 MEQ: 750 TABLET, EXTENDED RELEASE ORAL at 09:42

## 2019-07-17 RX ADMIN — POTASSIUM & SODIUM PHOSPHATES POWDER PACK 280-160-250 MG 1 PACKET: 280-160-250 PACK at 23:33

## 2019-07-17 RX ADMIN — PIPERACILLIN AND TAZOBACTAM 3.38 G: 3; .375 INJECTION, POWDER, FOR SOLUTION INTRAVENOUS at 17:28

## 2019-07-17 NOTE — PROGRESS NOTES
Hospitalist Progress Note      Hospital summary: A 77-year-old white male with past medical history of arthritis, asthma, type 2 diabetes mellitus, hypertension, morbid obesity, sleep apnea, who presented as a direct admission/transfer from [facility] with complaint of redness, swelling, and pain of both the legs, left greater than right. 7/12/2019      Assessment/Plan:  Sepsis 2/2 left lower extremity cellulitis - improving slowly  - hx chronic venous stasis changes with edema  -  lactic acidosis on POA- resolved  - leucocytosis 23.3 poa improving   - blood cx: NGTD  - wound cx were not sent on admission  - dced vanco 7/16.   - wound care  - appreciate ID input  - c/w IV zosyn     Acute metabolic encephalopathy resolved    CYNDI - improving  - nephrology on board  - cr unknown baseline  - nephro ok with lasix and lisinopril     DM - A1c 6.7. Changed iss to high sensitivity . Metformin on hold   HTN - c/w amlodipine, lisinopril   JOSUÉ on CPAP    Morbid Obesity Body mass index is 55.71 kg/m². weight loss recommended     Code status: Full  DVT prophylaxis: heparin   Disposition: TBD. Home with [facility] when ready possible in 1-2 days   ----------------------------------------------    CC: Leg cellulitis    S: Patient is seen and examined at bedside. Patient is anxious since last night after he talked with ID. Reassured the patient and his cellulitis is getting better and probably needs 2 more days of IV abx. Spoke with his brother on phone and reassured him and updated patient's status and possible discharge on Friday. AM Labs were not drawn due to hard stick     Review of Systems:  A comprehensive review of systems was negative.     O:  Visit Vitals  /65   Pulse 85   Temp 98.4 °F (36.9 °C)   Resp 18   Ht 5' 8\" (1.727 m)   Wt (!) 166.2 kg (366 lb 6.5 oz)   SpO2 93%   BMI 55.71 kg/m²       PHYSICAL EXAM:  Gen: NAD, morbid obese   HEENT: anicteric sclerae, normal conjunctiva, oropharynx clear, MM moist  Neck: supple, trachea midline, no adenopathy  Heart: RRR, no MRG, no JVD, no peripheral edema  Lungs: CTA b/l, non-labored respirations  Abd: soft, NT, ND, BS+, no organomegaly  Extr: bilateral LE swelling. Left leg erythematous and mild tender  Skin: dry, no rash  Neuro: CN II-XII grossly intact, normal speech, moves all extremities  Psych: normal mood, appropriate affect      Intake/Output Summary (Last 24 hours) at 7/17/2019 1144  Last data filed at 7/17/2019 0806  Gross per 24 hour   Intake    Output 1350 ml   Net -1350 ml        Recent labs & imaging reviewed:  Recent Results (from the past 24 hour(s))   GLUCOSE, POC    Collection Time: 07/16/19 11:55 AM   Result Value Ref Range    Glucose (POC) 145 (H) 65 - 100 mg/dL    Performed by Vinveli    GLUCOSE, POC    Collection Time: 07/16/19  5:03 PM   Result Value Ref Range    Glucose (POC) 174 (H) 65 - 100 mg/dL    Performed by Vinveli    GLUCOSE, POC    Collection Time: 07/17/19  6:18 AM   Result Value Ref Range    Glucose (POC) 141 (H) 65 - 100 mg/dL    Performed by Antonietta Slade (PCT)    GLUCOSE, POC    Collection Time: 07/17/19 11:32 AM   Result Value Ref Range    Glucose (POC) 158 (H) 65 - 100 mg/dL    Performed by Willard Calvin      Recent Labs     07/16/19  0855 07/15/19  0323   WBC 13.2* 14.3*   HGB 10.4* 10.2*   HCT 31.7* 31.1*    247     Recent Labs     07/16/19  0745 07/15/19  0323    138   K 3.5 3.4*    107   CO2 23 22   BUN 17 23*   CREA 1.33* 1.43*   * 112*   CA 8.2* 8.0*     No results for input(s): SGOT, GPT, ALT, AP, TBIL, TBILI, TP, ALB, GLOB, GGT, AML, LPSE in the last 72 hours. No lab exists for component: AMYP, HLPSE  No results for input(s): INR, PTP, APTT in the last 72 hours. No lab exists for component: INREXT, INREXT   No results for input(s): FE, TIBC, PSAT, FERR in the last 72 hours.    No results found for: FOL, RBCF   No results for input(s): PH, PCO2, PO2 in the last 72 hours. No results for input(s): CPK, CKNDX, TROIQ in the last 72 hours.     No lab exists for component: CPKMB  No results found for: CHOL, CHOLX, CHLST, CHOLV, HDL, LDL, LDLC, DLDLP, TGLX, TRIGL, TRIGP, CHHD, CHHDX  Lab Results   Component Value Date/Time    Glucose (POC) 158 (H) 07/17/2019 11:32 AM    Glucose (POC) 141 (H) 07/17/2019 06:18 AM    Glucose (POC) 174 (H) 07/16/2019 05:03 PM    Glucose (POC) 145 (H) 07/16/2019 11:55 AM    Glucose (POC) 137 (H) 07/16/2019 07:30 AM     Lab Results   Component Value Date/Time    Color YELLOW/STRAW 07/13/2019 07:16 AM    Appearance CLOUDY (A) 07/13/2019 07:16 AM    Specific gravity 1.022 07/13/2019 07:16 AM    pH (UA) 5.5 07/13/2019 07:16 AM    Protein 30 (A) 07/13/2019 07:16 AM    Glucose NEGATIVE  07/13/2019 07:16 AM    Ketone TRACE (A) 07/13/2019 07:16 AM    Bilirubin NEGATIVE  07/13/2019 07:16 AM    Urobilinogen 0.2 07/13/2019 07:16 AM    Nitrites NEGATIVE  07/13/2019 07:16 AM    Leukocyte Esterase NEGATIVE  07/13/2019 07:16 AM    Epithelial cells FEW 07/13/2019 07:16 AM    Bacteria 2+ (A) 07/13/2019 07:16 AM    WBC 5-10 07/13/2019 07:16 AM    RBC 0-5 07/13/2019 07:16 AM       Med list reviewed  Current Facility-Administered Medications   Medication Dose Route Frequency    potassium chloride SR (KLOR-CON 10) tablet 20 mEq  20 mEq Oral DAILY    amLODIPine (NORVASC) tablet 5 mg  5 mg Oral DAILY    furosemide (LASIX) tablet 20 mg  20 mg Oral BID    lisinopril (PRINIVIL, ZESTRIL) tablet 10 mg  10 mg Oral DAILY    sodium chloride (NS) flush 5-10 mL  5-10 mL IntraVENous PRN    acetaminophen (TYLENOL) tablet 650 mg  650 mg Oral Q4H PRN    piperacillin-tazobactam (ZOSYN) 3.375 g in 0.9% sodium chloride (MBP/ADV) 100 mL  3.375 g IntraVENous Q8H    sodium chloride (NS) flush 5-40 mL  5-40 mL IntraVENous Q8H    sodium chloride (NS) flush 5-40 mL  5-40 mL IntraVENous PRN    glucose chewable tablet 16 g  4 Tab Oral PRN    dextrose (D50W) injection syrg 12.5-25 g  25-50 mL IntraVENous PRN    glucagon (GLUCAGEN) injection 1 mg  1 mg IntraMUSCular PRN    insulin lispro (HUMALOG) injection   SubCUTAneous AC&HS    heparin (porcine) injection 5,000 Units  5,000 Units SubCUTAneous Q8H    albuterol-ipratropium (DUO-NEB) 2.5 MG-0.5 MG/3 ML  3 mL Nebulization Q4H PRN       Care Plan discussed with:  Patient/Family and Nurse/ cm    Gilda Ding MD  Internal Medicine  Date of Service: 7/17/2019

## 2019-07-17 NOTE — PROGRESS NOTES
7/17/19 -   ALEXANDR:  - ID now following  - IV ABX for at least 2 more days  - Cellulitis remains with leg swelling and redness  - Possible discharge 7/19  - HH most likely to include: wound care, PT, and OT  - LUC BRADY [facility] will be planned [facility] -  to await full recommendations to submit referral    CRM: Cammie Deal, MPH, [address] [place] [address]; Z: [phone]

## 2019-07-17 NOTE — CONSULTS
[address]    Name:  Kayla Darby  MR#:  [id number]  :  1955  ACCOUNT #:  [de-identified]  DATE OF SERVICE:  2019    LOCATION:  The patient is in room 507. ATTENDING PHYSICIAN:  Darin Ratliff MD    Consultation requested by Dr. Darin Ratliff. CHIEF COMPLAINT:  Fever, chills, and swelling and redness of the left leg. REASON FOR CONSULTATION:  Left leg cellulitis. The consultation was requested by Dr. Darin Ratliff. The history is obtained by interviewing the patient and review of the medical records. HISTORY OF PRESENT ILLNESS:  This patient is a 72-year-old white male with morbid obesity (weight 412 pounds and height 5 feet 8 inches) as well as a history of diabetes, hypertension, and obstructive sleep apnea syndrome. This patient describes the right pretibial erythema for about 25 years and then in recent years he developed left pretibial edema. About 20 years ago, he was hospitalized with acute cellulitis of the right leg but he apparently has not had significant cellulitis since then. The patient has felt well until 2019 when he had the sudden onset of \"horrible fever\" as well as shaking chills consistent with rigors. Shortly after that, he noticed significant diffuse erythema of the left leg from the foot about two thirds or three-fourth the way up to the left knee. He did not take any action at that time. However, when he did not improve he presented for evaluation on 2019. He was admitted to the hospital and started on vancomycin and Zosyn. I note that the patient had a fever up to 101.1 degrees Fahrenheit initially and had a white blood cell count of 23,300. The patient feels better. His white count is now down to 13,000. His temperature seems to be resolving. He has had no other new complaints. We are asked to him for further evaluation. PAST MEDICAL HISTORY:  Tobacco, none; alcohol, rare use.     MEDICATIONS PRIOR TO ADMISSION:  1. Lisinopril 10 mg p.o. daily. 2.  Amlodipine 5 mg p.o. daily. 3.  Metformin 500 mg p.o. daily. 4.  Lasix 40 mg p.o. daily. 5.  Potassium supplement. 6.  Meloxicam.    ALLERGIES:  HE WAS TOLD THAT HE HAD A REACTION TO PHENOBARBITAL AS A CHILD, BUT HE WAS NOT TOLD ANY OTHER DETAILS. NO OTHER ALLERGIES. ILLNESSES:  1. NIDDM - he states his hemoglobin A1c has been about 6.5%. He is on metformin. 2.  Hypertension. 3.  Obstructive sleep apnea syndrome. 4.  Morbid obesity. SURGERIES:  Tonsillectomy. SOCIAL HISTORY:  He is a . He works from home. FAMILY HISTORY:  Mother  at age 68 from COPD and heart disease. His father  at the age of 43 from alcohol abuse, possibly a heart attack. He has one brother with diabetes. REVIEW OF SYSTEMS:  CONSTITUTIONAL:  He had fever and shaking chills on 2019. He still had fever by the time of admission on 2019. HEENT:  No headache or visual change and no sore throat. LYMPHATIC:  No history of adenopathy. DERMATOLOGIC:  No recent rashes. RESPIRATORY:  No cough or hemoptysis. CARDIOVASCULAR:  No chest pain. GASTROINTESTINAL:  No nausea, vomiting, or diarrhea. GENITOURINARY:  No dysuria. MUSCULOSKELETAL:  Negative except as in HPI. NEUROLOGIC:  No history of seizure or stroke. PHYSICAL EXAMINATION:  GENERAL:  No acute distress. VITAL SIGNS:  Blood pressure is 130/75, heart rate 84, respiratory rate 18. He has been afebrile for the past 48 hours. HEENT:  Sclerae and conjunctivae benign, oropharynx benign. NECK:  Supple. NODES:  No adenopathy. SKIN:  No rashes. LUNGS:  Clear. HEART:  Regular rate and rhythm with no murmurs. ABDOMEN:  Soft, obese, no masses are felt, bowel sounds are present. EXTREMITIES:  Right leg, there are changes of venous stasis dermatitis, especially in the pretibial area with no signs of active infection.   Left leg, there is diffuse erythema consistent with cellulitis on the dorsum of the foot and then the circumferential erythema from the ankle about two-thirds or three-fourths the way to the knee. In addition, there is mild erythema of the medial thigh and then there is a patch of erythema with bullae on the proximal lateral left thigh. Pedal pulses are palpable. MUSCULOSKELETAL:  Muscles nontender, joints benign. NEUROLOGIC:  Alert and oriented. LABORATORY DATA:  CBC revealed a hemoglobin of 11.7, white count 23,300 on admission with a normal platelet count. Today, the white count is down to 13,200. Chemistry data reveals a BUN of 17, creatinine 1.33. On admission, the BUN is 43 and creatinine 2.34. MICROBIOLOGY DATA:  Blood cultures on admission, no growth so far. Urine culture on admission was also no growth. RADIOLOGY DATA:  Portable chest x-ray done on admission showed no pulmonary infiltrates. Renal ultrasound showed normal-appearing kidneys on 07/13/2019. IMPRESSION:  1. Acute cellulitis of the left leg: The clinical history is consistent with streptococcal cellulitis. However, this patient has morbid or super morbid obesity, diabetes, and venous stasis dermatitis and all of these factors increase the risk for other pathogens as well. He has no history of methicillin-resistant staphylococcus aureus. I note that vancomycin was stopped today after being started on admission on 07/13/2019. He remains on Zosyn which was also started on 07/13/2019. I will leave him on Zosyn alone for now and observe him. Overall, I think the likelihood of methicillin-resistant staphylococcus aureus being the pathogen is low. 2.  Non-insulin-dependent diabetes mellitus. 3.  Hypertension. 4.  Obstructive sleep apnea syndrome. 5.  Acute kidney injury on admission - his baseline creatinine is not known. 6.  History of presumed degenerative joint disease. RECOMMENDATIONS:  1. Continue Zosyn. 2.  Elevation of left leg.   3.  I suspect he will be slow to respond and will likely need to stay in the hospital for at least few more days. Thank you very much for allowing us to see this patient with you.       Cata Chappell MD      [id number]  D:  07/17/2019 1:01  T:  07/17/2019 1:08  JOB #:  [id number]  CC:  MD Gilda Mckeon MD

## 2019-07-17 NOTE — PROGRESS NOTES
Occupational Therapy Note:    Chart reviewed. Cleared by RN. Pt declined participation in ADLs stating he's had a rough day. Offered bed level therapy when pt declined OOB activity, still refusing treatment. He requested to come back tomorrow. Will follow up at a later time. Thank you.     Jamie Joseph OTS

## 2019-07-17 NOTE — PROGRESS NOTES
Problem: Pressure Injury - Risk of  Goal: *Prevention of pressure injury  Description  Document Grabiel Scale and appropriate interventions in the flowsheet. Outcome: Progressing Towards Goal  Note:   Pressure Injury Interventions:  Sensory Interventions: Assess changes in LOC, Keep linens dry and wrinkle-free    Moisture Interventions: Absorbent underpads, Maintain skin hydration (lotion/cream)    Activity Interventions: Assess need for specialty bed, Increase time out of bed, Pressure redistribution bed/mattress(bed type), PT/OT evaluation    Mobility Interventions: HOB 30 degrees or less, Pressure redistribution bed/mattress (bed type), PT/OT evaluation    Nutrition Interventions: Document food/fluid/supplement intake, Offer support with meals,snacks and hydration    Friction and Shear Interventions: Minimize layers                Problem: Patient Education: Go to Patient Education Activity  Goal: Patient/Family Education  Outcome: Progressing Towards Goal     Problem: Falls - Risk of  Goal: *Absence of Falls  Description  Document Brian Fall Risk and appropriate interventions in the flowsheet. Outcome: Progressing Towards Goal  Note:   Fall Risk Interventions:  Mobility Interventions: Patient to call before getting OOB    Mentation Interventions: Door open when patient unattended, More frequent rounding, Toileting rounds    Medication Interventions: Evaluate medications/consider consulting pharmacy, Patient to call before getting OOB, Teach patient to arise slowly    Elimination Interventions: Call light in reach              Problem: Patient Education: Go to Patient Education Activity  Goal: Patient/Family Education  Outcome: Progressing Towards Goal     Problem: Diabetes Self-Management  Goal: *Disease process and treatment process  Description  Define diabetes and identify own type of diabetes; list 3 options for treating diabetes.   Outcome: Progressing Towards Goal  Goal: *Incorporating nutritional management into lifestyle  Description  Describe effect of type, amount and timing of food on blood glucose; list 3 methods for planning meals. Outcome: Progressing Towards Goal  Goal: *Incorporating physical activity into lifestyle  Description  State effect of exercise on blood glucose levels. Outcome: Progressing Towards Goal  Goal: *Developing strategies to promote health/change behavior  Description  Define the ABC's of diabetes; identify appropriate screenings, schedule and personal plan for screenings. Outcome: Progressing Towards Goal  Goal: *Using medications safely  Description  State effect of diabetes medications on diabetes; name diabetes medication taking, action and side effects. Outcome: Progressing Towards Goal  Goal: *Monitoring blood glucose, interpreting and using results  Description  Identify recommended blood glucose targets  and personal targets. Outcome: Progressing Towards Goal  Goal: *Prevention, detection, treatment of acute complications  Description  List symptoms of hyper- and hypoglycemia; describe how to treat low blood sugar and actions for lowering  high blood glucose level. Outcome: Progressing Towards Goal  Goal: *Prevention, detection and treatment of chronic complications  Description  Define the natural course of diabetes and describe the relationship of blood glucose levels to long term complications of diabetes.   Outcome: Progressing Towards Goal  Goal: *Developing strategies to address psychosocial issues  Description  Describe feelings about living with diabetes; identify support needed and support network  Outcome: Progressing Towards Goal  Goal: *Insulin pump training  Outcome: Progressing Towards Goal  Goal: *Sick day guidelines  Outcome: Progressing Towards Goal  Goal: *Patient Specific Goal (EDIT GOAL, INSERT TEXT)  Outcome: Progressing Towards Goal     Problem: Patient Education: Go to Patient Education Activity  Goal: Patient/Family Education  Outcome: Progressing Towards Goal     Problem: Patient Education: Go to Patient Education Activity  Goal: Patient/Family Education  Outcome: Progressing Towards Goal     Problem: Patient Education: Go to Patient Education Activity  Goal: Patient/Family Education  Outcome: Progressing Towards Goal

## 2019-07-17 NOTE — PROGRESS NOTES
Bedside shift change report given to Stephanie (oncoming nurse) by Caterina Alvarez (offgoing nurse). Report included the following information SBAR, Kardex, Intake/Output and MAR.

## 2019-07-17 NOTE — PROGRESS NOTES
Spiritual Care Assessment/Progress Note  [facility]      NAME: Estiven May      MRN: [id number]  AGE: 66 y.o.  SEX: male  Caodaism Affiliation: Baptist   Language: English     7/17/2019     Total Time (in minutes): 12     Spiritual Assessment begun in [facility] [phone] through conversation with:         [x]Patient        [] Family    [] Friend(s)        Reason for Consult: Initial/Spiritual assessment, patient floor     Spiritual beliefs: (Please include comment if needed)     [x] Identifies with a raimundo tradition:  Baptist      [] Supported by a raimundo community:            [] Claims no spiritual orientation:           [] Seeking spiritual identity:                [] Adheres to an individual form of spirituality:           [] Not able to assess:                           Identified resources for coping:      [] Prayer                               [] Music                  [] Guided Imagery     [x] Family/friends                 [] Pet visits     [] Devotional reading                         [] Unknown     [] Other:                                               Interventions offered during this visit: (See comments for more details)    Patient Interventions: Affirmation of emotions/emotional suffering, Affirmation of raimundo, Catharsis/review of pertinent events in supportive environment, Coping skills reviewed/reinforced, Iconic (affirming the presence of God/Higher Power)           Plan of Care:     [] Support spiritual and/or cultural needs    [] Support AMD and/or advance care planning process      [] Support grieving process   [] Coordinate Rites and/or Rituals    [] Coordination with community clergy   [] No spiritual needs identified at this time   [] Detailed Plan of Care below (See Comments)  [] Make referral to Music Therapy  [] Make referral to Pet Therapy     [] Make referral to Addiction services  [] Make referral to [facility]  [] Make referral to Spiritual Care Partner  [] No future visits requested        [x] Follow up visits as needed     Comments:  visit for initial spiritual assessment. Patient lying in bed resting. Good eye contact, friendly. Says he is starting to feel better. Provided spiritual presence and listening as he spoke of his present thoughts, feelings, and concerns. Spoke briefly of his health saying he is feeling better, but wishes it did not take so long to get to where he is at this point. Says he is comfortable and not in need of anything at this time. Informed of availability of  and pastoral care. Appeared comforted and encouraged and expressed gratitude for this visit. Visited by Rev. Bora Leo MDiv, [facility], [facility]   paging service: [phone])

## 2019-07-17 NOTE — PROGRESS NOTES
Infectious Diseases Consultation     Please see dictated note     Impression      1. Acute cellulitis of the LLE -- The clinical history is c/w Streptococcal cellulitis but obesity, diabetes, and venous stasis dermatitis increase the risk of other pathogens also. He has no history of MRSA. 2. NIDDM    3. HTN    4. JOSUÉ    5. CYNDI -- baseline creatinine is not known    6. Hx of presumed DJD      Recommend:      1. Continue Zosyn     2. Elevation of the left leg    3.  I suspect he will be slow to respond and will need to stay in the hospital for at least a few more days      Thanks,   Von Umana MD  7/16/2019  8:50 PM CT looks like kidney stone has passed.  Pt. already has keflex at home and will complete this antibiotic.  Already has pain meds at home.  Zofran sent to local pharmacy for nausea so patient can keep hydrated.  Pt. will call his urologist and f/u as scheduled.

## 2019-07-17 NOTE — PROGRESS NOTES
[facility]   [address] [facility], [address] [place] [address], [facility]  Phone: [phone]   [phone]       Nephrology Progress Note  Estiven May     1955     [id number]  Date of Admission : 7/12/2019 07/17/19    CC:  Follow up for CYNDI       Assessment and Plan   CYNDI   -  presumed CYNDI, no recent baseline; volume depletion from outpatient diuretic, ACEi  and Meloxicam the most likely causes; infection possible; rule out obstruction  - no obstruction on imagining   - Cr down to 1.3  - continue Lisinopril and lasix on d/c  - labs pending     CKD - ?? baseline     Chronic venous insufficiency/dermatitis/wounds  -per Dr Vitor Molina      HTN, relative hypotension     DM     Obesity, JOSUÉ     Interval History:    Unable to get labs this morning   Reports good UOP   Noted plans for few days of Zosyn     Review of Systems: Pertinent items are noted in HPI.     Current Medications:   Current Facility-Administered Medications   Medication Dose Route Frequency    insulin lispro (HUMALOG) injection   SubCUTAneous AC&HS    potassium chloride SR (KLOR-CON 10) tablet 20 mEq  20 mEq Oral DAILY    amLODIPine (NORVASC) tablet 5 mg  5 mg Oral DAILY    furosemide (LASIX) tablet 20 mg  20 mg Oral BID    lisinopril (PRINIVIL, ZESTRIL) tablet 10 mg  10 mg Oral DAILY    sodium chloride (NS) flush 5-10 mL  5-10 mL IntraVENous PRN    acetaminophen (TYLENOL) tablet 650 mg  650 mg Oral Q4H PRN    piperacillin-tazobactam (ZOSYN) 3.375 g in 0.9% sodium chloride (MBP/ADV) 100 mL  3.375 g IntraVENous Q8H    sodium chloride (NS) flush 5-40 mL  5-40 mL IntraVENous Q8H    sodium chloride (NS) flush 5-40 mL  5-40 mL IntraVENous PRN    glucose chewable tablet 16 g  4 Tab Oral PRN    dextrose (D50W) injection syrg 12.5-25 g  25-50 mL IntraVENous PRN    glucagon (GLUCAGEN) injection 1 mg  1 mg IntraMUSCular PRN    heparin (porcine) injection 5,000 Units  5,000 Units SubCUTAneous Q8H    albuterol-ipratropium (DUO-NEB) 2.5 MG-0.5 MG/3 ML  3 mL Nebulization Q4H PRN      Allergies   Allergen Reactions    Phenobarbital Unknown (comments)       Objective:  Vitals:    Vitals:    07/16/19 1500 07/16/19 2252 07/17/19 0753 07/17/19 0805   BP: 128/80 130/75  112/65   Pulse: 70 84  85   Resp: 18 18  18   Temp: 98.4 °F (36.9 °C) 98.2 °F (36.8 °C)  98.4 °F (36.9 °C)   SpO2: 98% 99%  93%   Weight:   (!) 166.2 kg (366 lb 6.5 oz)    Height:         Intake and Output:  07/17 0701 - 07/17 1900  In: -   Out: 850 [Urine:850]  07/15 1901 - 07/17 0700  In: 300 [P.O.:300]  Out: 1000 [Urine:1000]    Physical Examination:    General: Obese   Neck:  Supple, no mass  Resp:  CTA  CV:  RRR,  no murmur or rub,chronic LE edema  GI:  Soft, NT, + Bowel sounds, no hepatosplenomegaly  Neurologic:  Non focal  Psych:             AAO x 3 appropriate affect   Skin:  Involving both LE      []    High complexity decision making was performed  []    Patient is at high-risk of decompensation with multiple organ involvement    Lab Data Personally Reviewed: I have reviewed all the pertinent labs, microbiology data and radiology studies during assessment.     Recent Labs     07/16/19  0745 07/15/19  0323    138   K 3.5 3.4*    107   CO2 23 22   * 112*   BUN 17 23*   CREA 1.33* 1.43*   CA 8.2* 8.0*     Recent Labs     07/16/19  0855 07/15/19  0323   WBC 13.2* 14.3*   HGB 10.4* 10.2*   HCT 31.7* 31.1*    247     No results found for: SDES  Lab Results   Component Value Date/Time    Culture result: NO GROWTH 2 DAYS 07/13/2019 07:16 AM    Culture result: NO GROWTH 4 DAYS 07/13/2019 01:08 AM    Culture result: NO GROWTH 4 DAYS 07/13/2019 01:08 AM     Recent Results (from the past 24 hour(s))   GLUCOSE, POC    Collection Time: 07/16/19  5:03 PM   Result Value Ref Range    Glucose (POC) 174 (H) 65 - 100 mg/dL    Performed by Shayla Peña    GLUCOSE, POC    Collection Time: 07/17/19  6:18 AM   Result Value Ref Range    Glucose (POC) 141 (H) 65 - 100 mg/dL Performed by Karli Lazo (PCT)    GLUCOSE, POC    Collection Time: 07/17/19 11:32 AM   Result Value Ref Range    Glucose (POC) 158 (H) 65 - 100 mg/dL    Performed by Blake Renee            Total time spent with patient:  xxx   min. Care Plan discussed with:  Patient     Family      RN      Consulting Physician [facility],         I have reviewed the flowsheets. Chart and Pertinent Notes have been reviewed. No change in PMH ,family and social history from Consult note.       Lito Nunez MD

## 2019-07-18 ENCOUNTER — HOME HEALTH ADMISSION (OUTPATIENT)
Dept: HOME HEALTH SERVICES | Facility: HOME HEALTH | Age: 64
End: 2019-07-18

## 2019-07-18 LAB
ANION GAP SERPL CALC-SCNC: 9 MMOL/L (ref 5–15)
BACTERIA SPEC CULT: NORMAL
BACTERIA SPEC CULT: NORMAL
BASOPHILS # BLD: 0.1 K/UL (ref 0–0.1)
BASOPHILS NFR BLD: 1 % (ref 0–1)
BUN SERPL-MCNC: 13 MG/DL (ref 6–20)
BUN/CREAT SERPL: 10 (ref 12–20)
CALCIUM SERPL-MCNC: 8.2 MG/DL (ref 8.5–10.1)
CHLORIDE SERPL-SCNC: 109 MMOL/L (ref 97–108)
CO2 SERPL-SCNC: 22 MMOL/L (ref 21–32)
CREAT SERPL-MCNC: 1.28 MG/DL (ref 0.7–1.3)
DIFFERENTIAL METHOD BLD: ABNORMAL
EOSINOPHIL # BLD: 0.3 K/UL (ref 0–0.4)
EOSINOPHIL NFR BLD: 2 % (ref 0–7)
ERYTHROCYTE [DISTWIDTH] IN BLOOD BY AUTOMATED COUNT: 13.2 % (ref 11.5–14.5)
GLUCOSE BLD STRIP.AUTO-MCNC: 128 MG/DL (ref 65–100)
GLUCOSE BLD STRIP.AUTO-MCNC: 136 MG/DL (ref 65–100)
GLUCOSE BLD STRIP.AUTO-MCNC: 140 MG/DL (ref 65–100)
GLUCOSE BLD STRIP.AUTO-MCNC: 141 MG/DL (ref 65–100)
GLUCOSE SERPL-MCNC: 121 MG/DL (ref 65–100)
HCT VFR BLD AUTO: 32.9 % (ref 36.6–50.3)
HGB BLD-MCNC: 10.2 G/DL (ref 12.1–17)
IMM GRANULOCYTES # BLD AUTO: 0.7 K/UL (ref 0–0.04)
IMM GRANULOCYTES NFR BLD AUTO: 5 % (ref 0–0.5)
LYMPHOCYTES # BLD: 2.1 K/UL (ref 0.8–3.5)
LYMPHOCYTES NFR BLD: 16 % (ref 12–49)
MCH RBC QN AUTO: 29.3 PG (ref 26–34)
MCHC RBC AUTO-ENTMCNC: 31 G/DL (ref 30–36.5)
MCV RBC AUTO: 94.5 FL (ref 80–99)
METAMYELOCYTES NFR BLD MANUAL: 1 %
MONOCYTES # BLD: 1.2 K/UL (ref 0–1)
MONOCYTES NFR BLD: 9 % (ref 5–13)
MYELOCYTES NFR BLD MANUAL: 1 %
NEUTS BAND NFR BLD MANUAL: 1 %
NEUTS SEG # BLD: 8.6 K/UL (ref 1.8–8)
NEUTS SEG NFR BLD: 64 % (ref 32–75)
NRBC # BLD: 0.02 K/UL (ref 0–0.01)
NRBC BLD-RTO: 0.2 PER 100 WBC
PLATELET # BLD AUTO: 385 K/UL (ref 150–400)
PLATELET COMMENTS,PCOM: ABNORMAL
PMV BLD AUTO: 11.3 FL (ref 8.9–12.9)
POTASSIUM SERPL-SCNC: 4.2 MMOL/L (ref 3.5–5.1)
RBC # BLD AUTO: 3.48 M/UL (ref 4.1–5.7)
RBC MORPH BLD: ABNORMAL
SERVICE CMNT-IMP: ABNORMAL
SERVICE CMNT-IMP: NORMAL
SERVICE CMNT-IMP: NORMAL
SODIUM SERPL-SCNC: 140 MMOL/L (ref 136–145)
WBC # BLD AUTO: 13.2 K/UL (ref 4.1–11.1)

## 2019-07-18 PROCEDURE — 85025 COMPLETE CBC W/AUTO DIFF WBC: CPT

## 2019-07-18 PROCEDURE — 74011250637 HC RX REV CODE- 250/637: Performed by: INTERNAL MEDICINE

## 2019-07-18 PROCEDURE — 65270000032 HC RM SEMIPRIVATE

## 2019-07-18 PROCEDURE — 80048 BASIC METABOLIC PNL TOTAL CA: CPT

## 2019-07-18 PROCEDURE — 36415 COLL VENOUS BLD VENIPUNCTURE: CPT

## 2019-07-18 PROCEDURE — 74011636637 HC RX REV CODE- 636/637: Performed by: INTERNAL MEDICINE

## 2019-07-18 PROCEDURE — 82962 GLUCOSE BLOOD TEST: CPT

## 2019-07-18 PROCEDURE — 74011000258 HC RX REV CODE- 258: Performed by: FAMILY MEDICINE

## 2019-07-18 PROCEDURE — 74011250636 HC RX REV CODE- 250/636: Performed by: FAMILY MEDICINE

## 2019-07-18 RX ORDER — FUROSEMIDE 40 MG/1
40 TABLET ORAL DAILY
Status: DISCONTINUED | OUTPATIENT
Start: 2019-07-19 | End: 2019-07-27 | Stop reason: HOSPADM

## 2019-07-18 RX ADMIN — INSULIN LISPRO 3 UNITS: 100 INJECTION, SOLUTION INTRAVENOUS; SUBCUTANEOUS at 16:43

## 2019-07-18 RX ADMIN — HEPARIN SODIUM 5000 UNITS: 5000 INJECTION INTRAVENOUS; SUBCUTANEOUS at 06:41

## 2019-07-18 RX ADMIN — POTASSIUM CHLORIDE 20 MEQ: 750 TABLET, EXTENDED RELEASE ORAL at 09:04

## 2019-07-18 RX ADMIN — Medication 10 ML: at 06:41

## 2019-07-18 RX ADMIN — PIPERACILLIN AND TAZOBACTAM 3.38 G: 3; .375 INJECTION, POWDER, FOR SOLUTION INTRAVENOUS at 00:58

## 2019-07-18 RX ADMIN — HEPARIN SODIUM 5000 UNITS: 5000 INJECTION INTRAVENOUS; SUBCUTANEOUS at 21:54

## 2019-07-18 RX ADMIN — HEPARIN SODIUM 5000 UNITS: 5000 INJECTION INTRAVENOUS; SUBCUTANEOUS at 15:00

## 2019-07-18 RX ADMIN — INSULIN LISPRO 2 UNITS: 100 INJECTION, SOLUTION INTRAVENOUS; SUBCUTANEOUS at 11:44

## 2019-07-18 RX ADMIN — PIPERACILLIN AND TAZOBACTAM 3.38 G: 3; .375 INJECTION, POWDER, FOR SOLUTION INTRAVENOUS at 16:44

## 2019-07-18 RX ADMIN — POTASSIUM & SODIUM PHOSPHATES POWDER PACK 280-160-250 MG 1 PACKET: 280-160-250 PACK at 13:00

## 2019-07-18 RX ADMIN — PIPERACILLIN AND TAZOBACTAM 3.38 G: 3; .375 INJECTION, POWDER, FOR SOLUTION INTRAVENOUS at 09:04

## 2019-07-18 RX ADMIN — FUROSEMIDE 20 MG: 20 TABLET ORAL at 09:05

## 2019-07-18 RX ADMIN — POTASSIUM & SODIUM PHOSPHATES POWDER PACK 280-160-250 MG 1 PACKET: 280-160-250 PACK at 09:05

## 2019-07-18 RX ADMIN — ACETAMINOPHEN 650 MG: 325 TABLET ORAL at 21:48

## 2019-07-18 RX ADMIN — LISINOPRIL 10 MG: 10 TABLET ORAL at 09:05

## 2019-07-18 RX ADMIN — INSULIN LISPRO 3 UNITS: 100 INJECTION, SOLUTION INTRAVENOUS; SUBCUTANEOUS at 21:53

## 2019-07-18 RX ADMIN — AMLODIPINE BESYLATE 5 MG: 5 TABLET ORAL at 09:05

## 2019-07-18 NOTE — PROGRESS NOTES
Bedside shift change report given to Latasha Can RN (oncoming nurse) by Phani Bowden RN (offgoing nurse). Report included the following information SBAR.    Bedside shift change report given to Stephanie (oncoming nurse) by Chad Neumann (offgoing nurse). Report included the following information SBAR.

## 2019-07-18 NOTE — DIABETES MGMT
[facility]    [facility] Progress Note    Recommendations/ Comments: Chart reviewed. BG remains stable. Noted Lantus has been discontinued. If appropriate, please consider: resume Metformin close to discharge - 500 mg with breakfast.     Current hospital DM medication: lispro correction scale - resistant sensitivity    Chart reviewed on Christi Person. Patient is a 66 y.o. male with known DM on Metformin at home. A1c:   Lab Results   Component Value Date/Time    Hemoglobin A1c 6.7 (H) 07/13/2019 03:05 PM       Recent Glucose Results:   Lab Results   Component Value Date/Time     (H) 07/18/2019 04:20 AM     (H) 07/17/2019 11:55 AM     (H) 07/17/2019 11:55 AM    GLUCPOC 128 (H) 07/18/2019 06:48 AM    GLUCPOC 139 (H) 07/17/2019 09:35 PM    GLUCPOC 162 (H) 07/17/2019 03:59 PM        Lab Results   Component Value Date/Time    Creatinine 1.28 07/18/2019 04:20 AM     Estimated Creatinine Clearance: 100.8 mL/min (based on SCr of 1.28 mg/dL). Active Orders   Diet    DIET DIABETIC CONSISTENT CARB Regular        PO intake:   Patient Vitals for the past 72 hrs:   % Diet Eaten   07/17/19 1728 100 %   07/17/19 1245 75 %   07/17/19 0942 100 %   07/16/19 0909 100 %   07/15/19 1809 100 %   07/15/19 1508 90 %       Will continue to follow as needed.     Thank you  Arturo Weber RD, Diabetes Clinician         Time spent: 3 minutes

## 2019-07-18 NOTE — PROGRESS NOTES
Hospitalist Progress Note      Hospital summary: A 21-year-old white male with past medical history of arthritis, asthma, type 2 diabetes mellitus, hypertension, morbid obesity, sleep apnea, who presented as a direct admission/transfer from [place] with complaint of redness, swelling, and pain of both the legs, left greater than right. 7/12/2019      Assessment/Plan:  Sepsis 2/2 left lower extremity cellulitis - improving slowly  - hx chronic venous stasis changes with edema  -  lactic acidosis on POA- resolved  - leucocytosis 23.3 poa improving   - blood cx: NGTD  - wound cx was not sent on admission  - dced vanco 7/16.   - wound care  - appreciate ID input  - c/w IV zosyn     Acute metabolic encephalopathy resolved    CYNDI - improving  - nephrology on board  - cr unknown baseline  - nephro ok with lasix and lisinopril     DM - A1c 6.7. C/w iss high sensitivity . Metformin on hold   HTN - c/w amlodipine, lisinopril   JOSUÉ on CPAP    Morbid Obesity Body mass index is 64.2 kg/m². weight loss recommended     Code status: Full  DVT prophylaxis: heparin   Disposition: TBD. Home with HH possible dc tomorrow   ----------------------------------------------    CC: Leg cellulitis    S: Patient is seen and examined at bedside. Feels ok. Leg erythema improving. Denied fever    Review of Systems:  A comprehensive review of systems was negative. O:  Visit Vitals  /67 (BP 1 Location: Right arm, BP Patient Position: Supine; At rest)   Pulse 76   Temp 98.5 °F (36.9 °C)   Resp 24   Ht 5' 8\" (1.727 m)   Wt (!) 191.5 kg (422 lb 3.2 oz)   SpO2 95%   BMI 64.20 kg/m²       PHYSICAL EXAM:  Gen: NAD, morbid obese   HEENT: anicteric sclerae, normal conjunctiva, oropharynx clear, MM moist  Neck: supple, trachea midline, no adenopathy  Heart: RRR, no MRG, no JVD, no peripheral edema  Lungs: CTA b/l, non-labored respirations  Abd: soft, NT, ND, BS+, no organomegaly  Extr: bilateral LE swelling.  Left leg erythematous and mild tender  Skin: dry, no rash  Neuro: CN II-XII grossly intact, normal speech, moves all extremities  Psych: normal mood, appropriate affect      Intake/Output Summary (Last 24 hours) at 7/18/2019 1449  Last data filed at 7/18/2019 0955  Gross per 24 hour   Intake 240 ml   Output 1180 ml   Net -940 ml        Recent labs & imaging reviewed:  Recent Results (from the past 24 hour(s))   GLUCOSE, POC    Collection Time: 07/17/19  3:59 PM   Result Value Ref Range    Glucose (POC) 162 (H) 65 - 100 mg/dL    Performed by [address], POC    Collection Time: 07/17/19  9:35 PM   Result Value Ref Range    Glucose (POC) 139 (H) 65 - 100 mg/dL    Performed by Elisa Canas    CBC WITH AUTOMATED DIFF    Collection Time: 07/18/19  4:20 AM   Result Value Ref Range    WBC 13.2 (H) 4.1 - 11.1 K/uL    RBC 3.48 (L) 4.10 - 5.70 M/uL    HGB 10.2 (L) 12.1 - 17.0 g/dL    HCT 32.9 (L) 36.6 - 50.3 %    MCV 94.5 80.0 - 99.0 FL    MCH 29.3 26.0 - 34.0 PG    MCHC 31.0 30.0 - 36.5 g/dL    RDW 13.2 11.5 - 14.5 %    PLATELET 377 556 - 289 K/uL    MPV 11.3 8.9 - 12.9 FL    NRBC 0.2 (H) 0  WBC    ABSOLUTE NRBC 0.02 (H) 0.00 - 0.01 K/uL    NEUTROPHILS 64 32 - 75 %    BAND NEUTROPHILS 1 %    LYMPHOCYTES 16 12 - 49 %    MONOCYTES 9 5 - 13 %    EOSINOPHILS 2 0 - 7 %    BASOPHILS 1 0 - 1 %    METAMYELOCYTES 1 %    MYELOCYTES 1 %    IMMATURE GRANULOCYTES 5 (H) 0.0 - 0.5 %    ABS. NEUTROPHILS 8.6 (H) 1.8 - 8.0 K/UL    ABS. LYMPHOCYTES 2.1 0.8 - 3.5 K/UL    ABS. MONOCYTES 1.2 (H) 0.0 - 1.0 K/UL    ABS. EOSINOPHILS 0.3 0.0 - 0.4 K/UL    ABS. BASOPHILS 0.1 0.0 - 0.1 K/UL    ABS. IMM.  GRANS. 0.7 (H) 0.00 - 0.04 K/UL    DF MANUAL      PLATELET COMMENTS Large Platelets      RBC COMMENTS POLYCHROMASIA  PRESENT       METABOLIC PANEL, BASIC    Collection Time: 07/18/19  4:20 AM   Result Value Ref Range    Sodium 140 136 - 145 mmol/L    Potassium 4.2 3.5 - 5.1 mmol/L    Chloride 109 (H) 97 - 108 mmol/L    CO2 22 21 - 32 mmol/L Anion gap 9 5 - 15 mmol/L    Glucose 121 (H) 65 - 100 mg/dL    BUN 13 6 - 20 MG/DL    Creatinine 1.28 0.70 - 1.30 MG/DL    BUN/Creatinine ratio 10 (L) 12 - 20      GFR est AA >60 >60 ml/min/1.73m2    GFR est non-AA 57 (L) >60 ml/min/1.73m2    Calcium 8.2 (L) 8.5 - 10.1 MG/DL   GLUCOSE, POC    Collection Time: 07/18/19  6:48 AM   Result Value Ref Range    Glucose (POC) 128 (H) 65 - 100 mg/dL    Performed by Jo-Ann Martinez (PCT)    GLUCOSE, POC    Collection Time: 07/18/19 11:15 AM   Result Value Ref Range    Glucose (POC) 141 (H) 65 - 100 mg/dL    Performed by Gasper Leonardo      Recent Labs     07/18/19  0420 07/17/19  1155   WBC 13.2* 14.0*   HGB 10.2* 10.3*   HCT 32.9* 32.1*    392     Recent Labs     07/18/19  0420 07/17/19  1155 07/16/19  0745    138  137 138   K 4.2 3.9  3.9 3.5   * 109*  109* 108   CO2 22 21  21 23   BUN 13 13  13 17   CREA 1.28 1.21  1.20 1.33*   * 152*  153* 127*   CA 8.2* 8.2*  8.1* 8.2*   PHOS  --  2.3*  --      Recent Labs     07/17/19  1155   ALB 1.8*     No results for input(s): INR, PTP, APTT in the last 72 hours. No lab exists for component: INREXT, INREXT   No results for input(s): FE, TIBC, PSAT, FERR in the last 72 hours. No results found for: FOL, RBCF   No results for input(s): PH, PCO2, PO2 in the last 72 hours. No results for input(s): CPK, CKNDX, TROIQ in the last 72 hours.     No lab exists for component: CPKMB  No results found for: CHOL, CHOLX, CHLST, CHOLV, HDL, LDL, LDLC, DLDLP, TGLX, TRIGL, TRIGP, CHHD, CHHDX  Lab Results   Component Value Date/Time    Glucose (POC) 141 (H) 07/18/2019 11:15 AM    Glucose (POC) 128 (H) 07/18/2019 06:48 AM    Glucose (POC) 139 (H) 07/17/2019 09:35 PM    Glucose (POC) 162 (H) 07/17/2019 03:59 PM    Glucose (POC) 158 (H) 07/17/2019 11:32 AM     Lab Results   Component Value Date/Time    Color YELLOW/STRAW 07/13/2019 07:16 AM    Appearance CLOUDY (A) 07/13/2019 07:16 AM    Specific gravity 1.022 07/13/2019 07:16 AM    pH (UA) 5.5 07/13/2019 07:16 AM    Protein 30 (A) 07/13/2019 07:16 AM    Glucose NEGATIVE  07/13/2019 07:16 AM    Ketone TRACE (A) 07/13/2019 07:16 AM    Bilirubin NEGATIVE  07/13/2019 07:16 AM    Urobilinogen 0.2 07/13/2019 07:16 AM    Nitrites NEGATIVE  07/13/2019 07:16 AM    Leukocyte Esterase NEGATIVE  07/13/2019 07:16 AM    Epithelial cells FEW 07/13/2019 07:16 AM    Bacteria 2+ (A) 07/13/2019 07:16 AM    WBC 5-10 07/13/2019 07:16 AM    RBC 0-5 07/13/2019 07:16 AM       Med list reviewed  Current Facility-Administered Medications   Medication Dose Route Frequency    [START ON 7/19/2019] furosemide (LASIX) tablet 40 mg  40 mg Oral DAILY    insulin lispro (HUMALOG) injection   SubCUTAneous AC&HS    potassium, sodium phosphates (NEUTRA-PHOS) packet 1 Packet  1 Packet Oral QID    potassium chloride SR (KLOR-CON 10) tablet 20 mEq  20 mEq Oral DAILY    amLODIPine (NORVASC) tablet 5 mg  5 mg Oral DAILY    lisinopril (PRINIVIL, ZESTRIL) tablet 10 mg  10 mg Oral DAILY    sodium chloride (NS) flush 5-10 mL  5-10 mL IntraVENous PRN    acetaminophen (TYLENOL) tablet 650 mg  650 mg Oral Q4H PRN    piperacillin-tazobactam (ZOSYN) 3.375 g in 0.9% sodium chloride (MBP/ADV) 100 mL  3.375 g IntraVENous Q8H    sodium chloride (NS) flush 5-40 mL  5-40 mL IntraVENous Q8H    sodium chloride (NS) flush 5-40 mL  5-40 mL IntraVENous PRN    glucose chewable tablet 16 g  4 Tab Oral PRN    dextrose (D50W) injection syrg 12.5-25 g  25-50 mL IntraVENous PRN    glucagon (GLUCAGEN) injection 1 mg  1 mg IntraMUSCular PRN    heparin (porcine) injection 5,000 Units  5,000 Units SubCUTAneous Q8H    albuterol-ipratropium (DUO-NEB) 2.5 MG-0.5 MG/3 ML  3 mL Nebulization Q4H PRN       Care Plan discussed with:  Patient/Family and Nurse/ andrew Ellis MD  Internal Medicine  Date of Service: 7/18/2019

## 2019-07-18 NOTE — PROGRESS NOTES
Infectious Diseases Progress Note          Subjective:     No new symptoms    Objective:     Vitals:   Visit Vitals  /67   Pulse 80   Temp 98.8 °F (37.1 °C)   Resp 20   Ht 5' 8\" (1.727 m)   Wt (!) 166.2 kg (366 lb 6.5 oz)   SpO2 95%   BMI 55.71 kg/m²        Tmax:  Temp (24hrs), Av.5 °F (36.9 °C), Min:98.2 °F (36.8 °C), Max:98.8 °F (37.1 °C)      Exam:  General appearance: alert, no distress  Lungs: clear to auscultation bilaterally  Heart: regular rate and rhythm  Abdomen: obese  LLE: still with severe erythema with bullae posterior calf, medial thigh and proximal lateral thigh    IV Lines: peripheral    Labs:    Recent Labs     19  1155 19  0855 19  0745 07/15/19  0323   WBC 14.0* 13.2*  --  14.3*   HGB 10.3* 10.4*  --  10.2*    280  --  247   BUN 13  13  --  17 23*   CREA 1.21  1.20  --  1.33* 1.43*       Assessment:     1. Acute cellulitis of the LLE -- The clinical history is c/w Streptococcal cellulitis but obesity, diabetes, and venous stasis dermatitis increase the risk of other pathogens also. He has no history of MRSA.     2. NIDDM     3. HTN     4. JOSUÉ     5. CYNDI -- baseline creatinine is not known     6. Hx of presumed DJD    Plan:     1.  Continue Zosyn -- will likely be slow to resolve    Ashley Rae MD

## 2019-07-18 NOTE — PROGRESS NOTES
Bedside shift change report given to Yao Macario (oncoming nurse) by Chino Marinelli RN (offgoing nurse). Report included the following information SBAR, Kardex, Intake/Output and MAR.

## 2019-07-18 NOTE — PROGRESS NOTES
Physical therapy:    Attempted PT session. Pt received supine in bed and asking assistance with urinating. Offered to assist patient to the bathroom, but pt declined any therapy at this time. Pt reported he had already been up multiple times this morning and would like to rest for now. Will defer therapy and continue to follow.  Thank you    Venecia Coelho, PT, DPT

## 2019-07-18 NOTE — PROGRESS NOTES
[facility]   [address] [facility], [address] [place] [address], [facility]  Phone: [phone]   [phone]       Nephrology Progress Note  Estiven May     1955     [id number]  Date of Admission : 7/12/2019 07/18/19    CC:  Follow up for CYNDI       Assessment and Plan   CYNDI   - Multifcatorial : Now resolved    - Cr stable since restarting Lisinopril and Lasix   - changed Lasix to 40 mg daily   - labs daily     CKD - ?? baseline     Chronic venous insufficiency/dermatitis/wounds  -per Dr Vitor Molina      HTN, relative hypotension     DM     Obesity, JOSUÉ     Interval History:    Sleeping this morning   UOP 1.5L   Cr stable at 1.2     Review of Systems: Pertinent items are noted in HPI.     Current Medications:   Current Facility-Administered Medications   Medication Dose Route Frequency    [START ON 7/19/2019] furosemide (LASIX) tablet 40 mg  40 mg Oral DAILY    insulin lispro (HUMALOG) injection   SubCUTAneous AC&HS    potassium, sodium phosphates (NEUTRA-PHOS) packet 1 Packet  1 Packet Oral QID    potassium chloride SR (KLOR-CON 10) tablet 20 mEq  20 mEq Oral DAILY    amLODIPine (NORVASC) tablet 5 mg  5 mg Oral DAILY    lisinopril (PRINIVIL, ZESTRIL) tablet 10 mg  10 mg Oral DAILY    sodium chloride (NS) flush 5-10 mL  5-10 mL IntraVENous PRN    acetaminophen (TYLENOL) tablet 650 mg  650 mg Oral Q4H PRN    piperacillin-tazobactam (ZOSYN) 3.375 g in 0.9% sodium chloride (MBP/ADV) 100 mL  3.375 g IntraVENous Q8H    sodium chloride (NS) flush 5-40 mL  5-40 mL IntraVENous Q8H    sodium chloride (NS) flush 5-40 mL  5-40 mL IntraVENous PRN    glucose chewable tablet 16 g  4 Tab Oral PRN    dextrose (D50W) injection syrg 12.5-25 g  25-50 mL IntraVENous PRN    glucagon (GLUCAGEN) injection 1 mg  1 mg IntraMUSCular PRN    heparin (porcine) injection 5,000 Units  5,000 Units SubCUTAneous Q8H    albuterol-ipratropium (DUO-NEB) 2.5 MG-0.5 MG/3 ML  3 mL Nebulization Q4H PRN      Allergies   Allergen Reactions    Phenobarbital Unknown (comments)       Objective:  Vitals:    Vitals:    07/17/19 1518 07/17/19 2328 07/18/19 0817 07/18/19 0904   BP: 108/67 104/65 95/56 145/87   Pulse: 80 82 78 82   Resp: 20 24 24    Temp: 98.8 °F (37.1 °C) 98.8 °F (37.1 °C) 97.7 °F (36.5 °C)    SpO2: 95% 92% 93%    Weight:       Height:         Intake and Output:  No intake/output data recorded. 07/16 1901 - 07/18 0700  In: 5 [P.O.:720]  Out: 2080 [Urine:2080]    Physical Examination:    General: Obese   Neck:  Supple, no mass  Resp:  CTA  CV:  RRR,  no murmur or rub,chronic LE edema  GI:  Soft, NT, + Bowel sounds, no hepatosplenomegaly  Neurologic:  Non focal  Psych:             AAO x 3 appropriate affect   Skin:  Involving both LE      []    High complexity decision making was performed  []    Patient is at high-risk of decompensation with multiple organ involvement    Lab Data Personally Reviewed: I have reviewed all the pertinent labs, microbiology data and radiology studies during assessment.     Recent Labs     07/18/19  0420 07/17/19  1155 07/16/19  0745    138  137 138   K 4.2 3.9  3.9 3.5   * 109*  109* 108   CO2 22 21  21 23   * 152*  153* 127*   BUN 13 13  13 17   CREA 1.28 1.21  1.20 1.33*   CA 8.2* 8.2*  8.1* 8.2*   PHOS  --  2.3*  --    ALB  --  1.8*  --      Recent Labs     07/18/19  0420 07/17/19  1155 07/16/19  0855   WBC 13.2* 14.0* 13.2*   HGB 10.2* 10.3* 10.4*   HCT 32.9* 32.1* 31.7*    392 280     No results found for: SDES  Lab Results   Component Value Date/Time    Culture result: NO GROWTH 2 DAYS 07/13/2019 07:16 AM    Culture result: NO GROWTH 5 DAYS 07/13/2019 01:08 AM    Culture result: NO GROWTH 5 DAYS 07/13/2019 01:08 AM     Recent Results (from the past 24 hour(s))   GLUCOSE, POC    Collection Time: 07/17/19 11:32 AM   Result Value Ref Range    Glucose (POC) 158 (H) 65 - 100 mg/dL    Performed by Sylvia [address], BASIC    Collection Time: 07/17/19 11:55 AM   Result Value Ref Range    Sodium 137 136 - 145 mmol/L    Potassium 3.9 3.5 - 5.1 mmol/L    Chloride 109 (H) 97 - 108 mmol/L    CO2 21 21 - 32 mmol/L    Anion gap 7 5 - 15 mmol/L    Glucose 153 (H) 65 - 100 mg/dL    BUN 13 6 - 20 MG/DL    Creatinine 1.20 0.70 - 1.30 MG/DL    BUN/Creatinine ratio 11 (L) 12 - 20      GFR est AA >60 >60 ml/min/1.73m2    GFR est non-AA >60 >60 ml/min/1.73m2    Calcium 8.1 (L) 8.5 - 10.1 MG/DL   RENAL FUNCTION PANEL    Collection Time: 07/17/19 11:55 AM   Result Value Ref Range    Sodium 138 136 - 145 mmol/L    Potassium 3.9 3.5 - 5.1 mmol/L    Chloride 109 (H) 97 - 108 mmol/L    CO2 21 21 - 32 mmol/L    Anion gap 8 5 - 15 mmol/L    Glucose 152 (H) 65 - 100 mg/dL    BUN 13 6 - 20 MG/DL    Creatinine 1.21 0.70 - 1.30 MG/DL    BUN/Creatinine ratio 11 (L) 12 - 20      GFR est AA >60 >60 ml/min/1.73m2    GFR est non-AA >60 >60 ml/min/1.73m2    Calcium 8.2 (L) 8.5 - 10.1 MG/DL    Phosphorus 2.3 (L) 2.6 - 4.7 MG/DL    Albumin 1.8 (L) 3.5 - 5.0 g/dL   CBC WITH AUTOMATED DIFF    Collection Time: 07/17/19 11:55 AM   Result Value Ref Range    WBC 14.0 (H) 4.1 - 11.1 K/uL    RBC 3.46 (L) 4.10 - 5.70 M/uL    HGB 10.3 (L) 12.1 - 17.0 g/dL    HCT 32.1 (L) 36.6 - 50.3 %    MCV 92.8 80.0 - 99.0 FL    MCH 29.8 26.0 - 34.0 PG    MCHC 32.1 30.0 - 36.5 g/dL    RDW 13.1 11.5 - 14.5 %    PLATELET 790 401 - 214 K/uL    MPV 11.1 8.9 - 12.9 FL    NRBC 0.1 (H) 0  WBC    ABSOLUTE NRBC 0.02 (H) 0.00 - 0.01 K/uL    NEUTROPHILS 71 32 - 75 %    BAND NEUTROPHILS 1 0 - 6 %    LYMPHOCYTES 10 (L) 12 - 49 %    MONOCYTES 11 5 - 13 %    EOSINOPHILS 2 0 - 7 %    BASOPHILS 0 0 - 1 %    METAMYELOCYTES 2 (H) 0 %    MYELOCYTES 3 (H) 0 %    IMMATURE GRANULOCYTES 0 %    ABS. NEUTROPHILS 10.1 (H) 1.8 - 8.0 K/UL    ABS. LYMPHOCYTES 1.4 0.8 - 3.5 K/UL    ABS. MONOCYTES 1.5 (H) 0.0 - 1.0 K/UL    ABS. EOSINOPHILS 0.3 0.0 - 0.4 K/UL    ABS. BASOPHILS 0.0 0.0 - 0.1 K/UL    ABS. IMM.  GRANS. 0.0 K/UL    DF MANUAL      RBC COMMENTS NORMOCYTIC, NORMOCHROMIC     GLUCOSE, POC    Collection Time: 07/17/19  3:59 PM   Result Value Ref Range    Glucose (POC) 162 (H) 65 - 100 mg/dL    Performed by [address], POC    Collection Time: 07/17/19  9:35 PM   Result Value Ref Range    Glucose (POC) 139 (H) 65 - 100 mg/dL    Performed by Goldy Mishra    CBC WITH AUTOMATED DIFF    Collection Time: 07/18/19  4:20 AM   Result Value Ref Range    WBC 13.2 (H) 4.1 - 11.1 K/uL    RBC 3.48 (L) 4.10 - 5.70 M/uL    HGB 10.2 (L) 12.1 - 17.0 g/dL    HCT 32.9 (L) 36.6 - 50.3 %    MCV 94.5 80.0 - 99.0 FL    MCH 29.3 26.0 - 34.0 PG    MCHC 31.0 30.0 - 36.5 g/dL    RDW 13.2 11.5 - 14.5 %    PLATELET 834 443 - 813 K/uL    MPV 11.3 8.9 - 12.9 FL    NRBC 0.2 (H) 0  WBC    ABSOLUTE NRBC 0.02 (H) 0.00 - 0.01 K/uL    NEUTROPHILS PENDING %    LYMPHOCYTES PENDING %    MONOCYTES PENDING %    EOSINOPHILS PENDING %    BASOPHILS PENDING %    IMMATURE GRANULOCYTES PENDING %    ABS. NEUTROPHILS PENDING K/UL    ABS. LYMPHOCYTES PENDING K/UL    ABS. MONOCYTES PENDING K/UL    ABS. EOSINOPHILS PENDING K/UL    ABS. BASOPHILS PENDING K/UL    ABS. IMM. GRANS. PENDING K/UL    DF PENDING    METABOLIC PANEL, BASIC    Collection Time: 07/18/19  4:20 AM   Result Value Ref Range    Sodium 140 136 - 145 mmol/L    Potassium 4.2 3.5 - 5.1 mmol/L    Chloride 109 (H) 97 - 108 mmol/L    CO2 22 21 - 32 mmol/L    Anion gap 9 5 - 15 mmol/L    Glucose 121 (H) 65 - 100 mg/dL    BUN 13 6 - 20 MG/DL    Creatinine 1.28 0.70 - 1.30 MG/DL    BUN/Creatinine ratio 10 (L) 12 - 20      GFR est AA >60 >60 ml/min/1.73m2    GFR est non-AA 57 (L) >60 ml/min/1.73m2    Calcium 8.2 (L) 8.5 - 10.1 MG/DL   GLUCOSE, POC    Collection Time: 07/18/19  6:48 AM   Result Value Ref Range    Glucose (POC) 128 (H) 65 - 100 mg/dL    Performed by Morgan Loredo (PCT)            Total time spent with patient:  xxx   min.                                Care Plan discussed with:  Patient     Family      RN Consulting Physician [facility]        I have reviewed the flowsheets. Chart and Pertinent Notes have been reviewed. No change in PMH ,family and social history from Consult note.       Candice Harrington MD

## 2019-07-18 NOTE — PROGRESS NOTES
7/18/19 -   ALEXANDR:  - IV ABX continue  - Receiving lasix now  - Declined therapies yesterday and stated to nursing that he will likely decline today  - Likely discharge tomorrow, 7/19  -  recommendations  - Per previous conversation with patient, CM submitted referral to [facility] via [address] [place] [address] for [facility] wound care, PT, and OT assessments    CRM: Thuy Kern, MPH, [address] [facility]; Z: [phone]

## 2019-07-18 NOTE — PROGRESS NOTES
Occupational Therapy  Attempted to see for treatment, requested to rest, verbalized being up a lot today and needing to get some sleep. Agreeable to tomorrow.    Davie Rodgers, OTR/L

## 2019-07-19 LAB
ANION GAP SERPL CALC-SCNC: 9 MMOL/L (ref 5–15)
BASOPHILS # BLD: 0.1 K/UL (ref 0–0.1)
BASOPHILS NFR BLD: 1 % (ref 0–1)
BUN SERPL-MCNC: 11 MG/DL (ref 6–20)
BUN/CREAT SERPL: 8 (ref 12–20)
CALCIUM SERPL-MCNC: 8.4 MG/DL (ref 8.5–10.1)
CHLORIDE SERPL-SCNC: 108 MMOL/L (ref 97–108)
CO2 SERPL-SCNC: 23 MMOL/L (ref 21–32)
CREAT SERPL-MCNC: 1.34 MG/DL (ref 0.7–1.3)
DIFFERENTIAL METHOD BLD: ABNORMAL
EOSINOPHIL # BLD: 0.3 K/UL (ref 0–0.4)
EOSINOPHIL NFR BLD: 2 % (ref 0–7)
ERYTHROCYTE [DISTWIDTH] IN BLOOD BY AUTOMATED COUNT: 13.2 % (ref 11.5–14.5)
GLUCOSE BLD STRIP.AUTO-MCNC: 128 MG/DL (ref 65–100)
GLUCOSE BLD STRIP.AUTO-MCNC: 128 MG/DL (ref 65–100)
GLUCOSE BLD STRIP.AUTO-MCNC: 136 MG/DL (ref 65–100)
GLUCOSE BLD STRIP.AUTO-MCNC: 160 MG/DL (ref 65–100)
GLUCOSE SERPL-MCNC: 113 MG/DL (ref 65–100)
HCT VFR BLD AUTO: 32.2 % (ref 36.6–50.3)
HGB BLD-MCNC: 10.2 G/DL (ref 12.1–17)
IMM GRANULOCYTES # BLD AUTO: 0.4 K/UL (ref 0–0.04)
IMM GRANULOCYTES NFR BLD AUTO: 3 % (ref 0–0.5)
LYMPHOCYTES # BLD: 2.3 K/UL (ref 0.8–3.5)
LYMPHOCYTES NFR BLD: 16 % (ref 12–49)
MCH RBC QN AUTO: 29.5 PG (ref 26–34)
MCHC RBC AUTO-ENTMCNC: 31.7 G/DL (ref 30–36.5)
MCV RBC AUTO: 93.1 FL (ref 80–99)
MONOCYTES # BLD: 1.4 K/UL (ref 0–1)
MONOCYTES NFR BLD: 10 % (ref 5–13)
MYELOCYTES NFR BLD MANUAL: 1 %
NEUTS BAND NFR BLD MANUAL: 2 %
NEUTS SEG # BLD: 9.6 K/UL (ref 1.8–8)
NEUTS SEG NFR BLD: 65 % (ref 32–75)
NRBC # BLD: 0 K/UL (ref 0–0.01)
NRBC BLD-RTO: 0 PER 100 WBC
PLATELET # BLD AUTO: 459 K/UL (ref 150–400)
PMV BLD AUTO: 10.9 FL (ref 8.9–12.9)
POTASSIUM SERPL-SCNC: 3.8 MMOL/L (ref 3.5–5.1)
RBC # BLD AUTO: 3.46 M/UL (ref 4.1–5.7)
RBC MORPH BLD: ABNORMAL
SERVICE CMNT-IMP: ABNORMAL
SODIUM SERPL-SCNC: 140 MMOL/L (ref 136–145)
WBC # BLD AUTO: 14.3 K/UL (ref 4.1–11.1)

## 2019-07-19 PROCEDURE — 74011000258 HC RX REV CODE- 258: Performed by: FAMILY MEDICINE

## 2019-07-19 PROCEDURE — 74011250637 HC RX REV CODE- 250/637: Performed by: INTERNAL MEDICINE

## 2019-07-19 PROCEDURE — 65270000032 HC RM SEMIPRIVATE

## 2019-07-19 PROCEDURE — 74011636637 HC RX REV CODE- 636/637: Performed by: INTERNAL MEDICINE

## 2019-07-19 PROCEDURE — 82962 GLUCOSE BLOOD TEST: CPT

## 2019-07-19 PROCEDURE — 85025 COMPLETE CBC W/AUTO DIFF WBC: CPT

## 2019-07-19 PROCEDURE — 74011250636 HC RX REV CODE- 250/636: Performed by: FAMILY MEDICINE

## 2019-07-19 PROCEDURE — 80048 BASIC METABOLIC PNL TOTAL CA: CPT

## 2019-07-19 PROCEDURE — 36415 COLL VENOUS BLD VENIPUNCTURE: CPT

## 2019-07-19 RX ADMIN — ACETAMINOPHEN 650 MG: 325 TABLET ORAL at 22:25

## 2019-07-19 RX ADMIN — HEPARIN SODIUM 5000 UNITS: 5000 INJECTION INTRAVENOUS; SUBCUTANEOUS at 17:13

## 2019-07-19 RX ADMIN — ACETAMINOPHEN 650 MG: 325 TABLET ORAL at 04:24

## 2019-07-19 RX ADMIN — INSULIN LISPRO 3 UNITS: 100 INJECTION, SOLUTION INTRAVENOUS; SUBCUTANEOUS at 19:06

## 2019-07-19 RX ADMIN — PIPERACILLIN AND TAZOBACTAM 3.38 G: 3; .375 INJECTION, POWDER, FOR SOLUTION INTRAVENOUS at 02:19

## 2019-07-19 RX ADMIN — PIPERACILLIN AND TAZOBACTAM 3.38 G: 3; .375 INJECTION, POWDER, FOR SOLUTION INTRAVENOUS at 10:33

## 2019-07-19 RX ADMIN — HEPARIN SODIUM 5000 UNITS: 5000 INJECTION INTRAVENOUS; SUBCUTANEOUS at 06:19

## 2019-07-19 RX ADMIN — HEPARIN SODIUM 5000 UNITS: 5000 INJECTION INTRAVENOUS; SUBCUTANEOUS at 22:16

## 2019-07-19 RX ADMIN — Medication 10 ML: at 17:14

## 2019-07-19 RX ADMIN — POTASSIUM CHLORIDE 20 MEQ: 750 TABLET, EXTENDED RELEASE ORAL at 10:33

## 2019-07-19 RX ADMIN — AMLODIPINE BESYLATE 5 MG: 5 TABLET ORAL at 10:34

## 2019-07-19 RX ADMIN — LISINOPRIL 10 MG: 10 TABLET ORAL at 10:33

## 2019-07-19 RX ADMIN — PIPERACILLIN AND TAZOBACTAM 3.38 G: 3; .375 INJECTION, POWDER, FOR SOLUTION INTRAVENOUS at 19:07

## 2019-07-19 RX ADMIN — Medication 10 ML: at 06:18

## 2019-07-19 RX ADMIN — FUROSEMIDE 40 MG: 40 TABLET ORAL at 10:34

## 2019-07-19 RX ADMIN — Medication 10 ML: at 22:16

## 2019-07-19 NOTE — PROGRESS NOTES
Hospitalist Progress Note      Hospital summary: A 61-year-old white male with past medical history of arthritis, asthma, type 2 diabetes mellitus, hypertension, morbid obesity, sleep apnea, who presented as a direct admission/transfer from [place] with complaint of redness, swelling, and pain of both the legs, left greater than right. 7/12/2019      Assessment/Plan:  Sepsis 2/2 left lower extremity cellulitis - improving slowly  - hx chronic venous stasis changes with edema  -  lactic acidosis on POA- resolved  - leucocytosis 23.3 poa improving 14.3   - blood cx: NGTD  - wound cx was not sent on admission  - dced vanco 7/16.   - wound care  - appreciate ID input  - c/w IV zosyn   - edema decreased, still erythematous, will continue IV abx for one more day     Acute metabolic encephalopathy resolved    CYNDI  - mild worsening of cr today, will monitor   - nephrology on board  - cr unknown baseline  - nephro ok with lasix and lisinopril     DM - A1c 6.7. C/w iss high sensitivity . Metformin on hold   HTN - c/w amlodipine, lisinopril   JOSUÉ on CPAP    Morbid Obesity Body mass index is 55.28 kg/m². weight loss recommended     Code status: Full  DVT prophylaxis: heparin   Disposition: TBD. Home with HH possible dc tomorrow   ----------------------------------------------    CC: Leg cellulitis    S: Patient is seen and examined at bedside. Feels ok. Leg erythema and edema improving. Denied fever    Review of Systems:  A comprehensive review of systems was negative.     O:  Visit Vitals  /77 (BP 1 Location: Left arm, BP Patient Position: At rest)   Pulse 71   Temp 98.1 °F (36.7 °C)   Resp 18   Ht 5' 8\" (1.727 m)   Wt (!) 164.9 kg (363 lb 8.6 oz)   SpO2 96%   BMI 55.28 kg/m²       PHYSICAL EXAM:  Gen: NAD, morbid obese   HEENT: anicteric sclerae, normal conjunctiva, oropharynx clear, MM moist  Neck: supple, trachea midline, no adenopathy  Heart: RRR, no MRG, no JVD, no peripheral edema  Lungs: CTA b/l, non-labored respirations  Abd: soft, NT, ND, BS+, no organomegaly  Extr: bilateral LE swelling. Left leg erythematous and mild tender  Skin: dry, no rash  Neuro: CN II-XII grossly intact, normal speech, moves all extremities  Psych: normal mood, appropriate affect      Intake/Output Summary (Last 24 hours) at 7/19/2019 1057  Last data filed at 7/19/2019 0320  Gross per 24 hour   Intake 360 ml   Output 1800 ml   Net -1440 ml        Recent labs & imaging reviewed:  Recent Results (from the past 24 hour(s))   GLUCOSE, POC    Collection Time: 07/18/19 11:15 AM   Result Value Ref Range    Glucose (POC) 141 (H) 65 - 100 mg/dL    Performed by Cody Appiah, POC    Collection Time: 07/18/19  5:39 PM   Result Value Ref Range    Glucose (POC) 136 (H) 65 - 100 mg/dL    Performed by Rosa Avila    GLUCOSE, POC    Collection Time: 07/18/19  9:22 PM   Result Value Ref Range    Glucose (POC) 140 (H) 65 - 100 mg/dL    Performed by Zain Sun    CBC WITH AUTOMATED DIFF    Collection Time: 07/19/19  3:33 AM   Result Value Ref Range    WBC 14.3 (H) 4.1 - 11.1 K/uL    RBC 3.46 (L) 4.10 - 5.70 M/uL    HGB 10.2 (L) 12.1 - 17.0 g/dL    HCT 32.2 (L) 36.6 - 50.3 %    MCV 93.1 80.0 - 99.0 FL    MCH 29.5 26.0 - 34.0 PG    MCHC 31.7 30.0 - 36.5 g/dL    RDW 13.2 11.5 - 14.5 %    PLATELET 680 (H) 652 - 400 K/uL    MPV 10.9 8.9 - 12.9 FL    NRBC 0.0 0  WBC    ABSOLUTE NRBC 0.00 0.00 - 0.01 K/uL    NEUTROPHILS 65 32 - 75 %    BAND NEUTROPHILS 2 %    LYMPHOCYTES 16 12 - 49 %    MONOCYTES 10 5 - 13 %    EOSINOPHILS 2 0 - 7 %    BASOPHILS 1 0 - 1 %    MYELOCYTES 1 %    IMMATURE GRANULOCYTES 3 (H) 0.0 - 0.5 %    ABS. NEUTROPHILS 9.6 (H) 1.8 - 8.0 K/UL    ABS. LYMPHOCYTES 2.3 0.8 - 3.5 K/UL    ABS. MONOCYTES 1.4 (H) 0.0 - 1.0 K/UL    ABS. EOSINOPHILS 0.3 0.0 - 0.4 K/UL    ABS. BASOPHILS 0.1 0.0 - 0.1 K/UL    ABS. IMM.  GRANS. 0.4 (H) 0.00 - 0.04 K/UL    DF MANUAL      RBC COMMENTS POLYCHROMASIA  PRESENT METABOLIC PANEL, BASIC    Collection Time: 07/19/19  3:33 AM   Result Value Ref Range    Sodium 140 136 - 145 mmol/L    Potassium 3.8 3.5 - 5.1 mmol/L    Chloride 108 97 - 108 mmol/L    CO2 23 21 - 32 mmol/L    Anion gap 9 5 - 15 mmol/L    Glucose 113 (H) 65 - 100 mg/dL    BUN 11 6 - 20 MG/DL    Creatinine 1.34 (H) 0.70 - 1.30 MG/DL    BUN/Creatinine ratio 8 (L) 12 - 20      GFR est AA >60 >60 ml/min/1.73m2    GFR est non-AA 54 (L) >60 ml/min/1.73m2    Calcium 8.4 (L) 8.5 - 10.1 MG/DL   GLUCOSE, POC    Collection Time: 07/19/19  6:11 AM   Result Value Ref Range    Glucose (POC) 128 (H) 65 - 100 mg/dL    Performed by [facility]      Recent Labs     07/19/19  0333 07/18/19  0420   WBC 14.3* 13.2*   HGB 10.2* 10.2*   HCT 32.2* 32.9*   * 385     Recent Labs     07/19/19  0333 07/18/19  0420 07/17/19  1155    140 138  137   K 3.8 4.2 3.9  3.9    109* 109*  109*   CO2 23 22 21  21   BUN 11 13 13  13   CREA 1.34* 1.28 1.21  1.20   * 121* 152*  153*   CA 8.4* 8.2* 8.2*  8.1*   PHOS  --   --  2.3*     Recent Labs     07/17/19  1155   ALB 1.8*     No results for input(s): INR, PTP, APTT in the last 72 hours. No lab exists for component: INREXT, INREXT   No results for input(s): FE, TIBC, PSAT, FERR in the last 72 hours. No results found for: FOL, RBCF   No results for input(s): PH, PCO2, PO2 in the last 72 hours. No results for input(s): CPK, CKNDX, TROIQ in the last 72 hours.     No lab exists for component: CPKMB  No results found for: CHOL, CHOLX, CHLST, CHOLV, HDL, LDL, LDLC, DLDLP, TGLX, TRIGL, TRIGP, CHHD, CHHDX  Lab Results   Component Value Date/Time    Glucose (POC) 128 (H) 07/19/2019 06:11 AM    Glucose (POC) 140 (H) 07/18/2019 09:22 PM    Glucose (POC) 136 (H) 07/18/2019 05:39 PM    Glucose (POC) 141 (H) 07/18/2019 11:15 AM    Glucose (POC) 128 (H) 07/18/2019 06:48 AM     Lab Results   Component Value Date/Time    Color YELLOW/STRAW 07/13/2019 07:16 AM    Appearance CLOUDY (A) 07/13/2019 07:16 AM    Specific gravity 1.022 07/13/2019 07:16 AM    pH (UA) 5.5 07/13/2019 07:16 AM    Protein 30 (A) 07/13/2019 07:16 AM    Glucose NEGATIVE  07/13/2019 07:16 AM    Ketone TRACE (A) 07/13/2019 07:16 AM    Bilirubin NEGATIVE  07/13/2019 07:16 AM    Urobilinogen 0.2 07/13/2019 07:16 AM    Nitrites NEGATIVE  07/13/2019 07:16 AM    Leukocyte Esterase NEGATIVE  07/13/2019 07:16 AM    Epithelial cells FEW 07/13/2019 07:16 AM    Bacteria 2+ (A) 07/13/2019 07:16 AM    WBC 5-10 07/13/2019 07:16 AM    RBC 0-5 07/13/2019 07:16 AM       Med list reviewed  Current Facility-Administered Medications   Medication Dose Route Frequency    furosemide (LASIX) tablet 40 mg  40 mg Oral DAILY    insulin lispro (HUMALOG) injection   SubCUTAneous AC&HS    potassium chloride SR (KLOR-CON 10) tablet 20 mEq  20 mEq Oral DAILY    amLODIPine (NORVASC) tablet 5 mg  5 mg Oral DAILY    lisinopril (PRINIVIL, ZESTRIL) tablet 10 mg  10 mg Oral DAILY    sodium chloride (NS) flush 5-10 mL  5-10 mL IntraVENous PRN    acetaminophen (TYLENOL) tablet 650 mg  650 mg Oral Q4H PRN    piperacillin-tazobactam (ZOSYN) 3.375 g in 0.9% sodium chloride (MBP/ADV) 100 mL  3.375 g IntraVENous Q8H    sodium chloride (NS) flush 5-40 mL  5-40 mL IntraVENous Q8H    sodium chloride (NS) flush 5-40 mL  5-40 mL IntraVENous PRN    glucose chewable tablet 16 g  4 Tab Oral PRN    dextrose (D50W) injection syrg 12.5-25 g  25-50 mL IntraVENous PRN    glucagon (GLUCAGEN) injection 1 mg  1 mg IntraMUSCular PRN    heparin (porcine) injection 5,000 Units  5,000 Units SubCUTAneous Q8H    albuterol-ipratropium (DUO-NEB) 2.5 MG-0.5 MG/3 ML  3 mL Nebulization Q4H PRN       Care Plan discussed with:  Patient/Family and Nurse/ cm    Ernestina Balbuena MD  Internal Medicine  Date of Service: 7/19/2019

## 2019-07-19 NOTE — PROGRESS NOTES
Bedside shift change report given to Randy Lambert (oncoming nurse) by Darleen Kawasaki (offgoing nurse). Report included the following information SBAR, Kardex and Recent Results.

## 2019-07-19 NOTE — PROGRESS NOTES
[facility]   [address] [facility], [address] [place] [address], [facility]  Phone: [phone]   [phone]       Nephrology Progress Note  Christi Person     1955     [id number]  Date of Admission : 7/12/2019 07/19/19    CC:  Follow up for CYNDI       Assessment and Plan   CYNDI   - Multifcatorial : Now resolved    - Cr stable since restarting Lisinopril and Lasix   - labs daily     CKD - ?? baseline     Chronic venous insufficiency/dermatitis/wounds  -per Dr Vita Pickard      HTN, relative hypotension     DM     Obesity, JOSUÉ     Interval History:    Doing well   No complaints   No N/V/CP/SOB    Review of Systems: Pertinent items are noted in HPI.     Current Medications:   Current Facility-Administered Medications   Medication Dose Route Frequency    furosemide (LASIX) tablet 40 mg  40 mg Oral DAILY    insulin lispro (HUMALOG) injection   SubCUTAneous AC&HS    potassium chloride SR (KLOR-CON 10) tablet 20 mEq  20 mEq Oral DAILY    amLODIPine (NORVASC) tablet 5 mg  5 mg Oral DAILY    lisinopril (PRINIVIL, ZESTRIL) tablet 10 mg  10 mg Oral DAILY    sodium chloride (NS) flush 5-10 mL  5-10 mL IntraVENous PRN    acetaminophen (TYLENOL) tablet 650 mg  650 mg Oral Q4H PRN    piperacillin-tazobactam (ZOSYN) 3.375 g in 0.9% sodium chloride (MBP/ADV) 100 mL  3.375 g IntraVENous Q8H    sodium chloride (NS) flush 5-40 mL  5-40 mL IntraVENous Q8H    sodium chloride (NS) flush 5-40 mL  5-40 mL IntraVENous PRN    glucose chewable tablet 16 g  4 Tab Oral PRN    dextrose (D50W) injection syrg 12.5-25 g  25-50 mL IntraVENous PRN    glucagon (GLUCAGEN) injection 1 mg  1 mg IntraMUSCular PRN    heparin (porcine) injection 5,000 Units  5,000 Units SubCUTAneous Q8H    albuterol-ipratropium (DUO-NEB) 2.5 MG-0.5 MG/3 ML  3 mL Nebulization Q4H PRN      Allergies   Allergen Reactions    Phenobarbital Unknown (comments)       Objective:  Vitals:    Vitals:    07/1955 07/19/19 0013 07/19/19 0445 07/19/19 0740 BP: 117/70 103/56 104/63 128/77   Pulse: 79 73 75 71   Resp: 24 24 16 18   Temp: 99 °F (37.2 °C) 98.8 °F (37.1 °C) 99 °F (37.2 °C) 98.1 °F (36.7 °C)   SpO2: 93% 95% 94% 96%   Weight:   (!) 164.9 kg (363 lb 8.6 oz)    Height:         Intake and Output:  No intake/output data recorded. 07/17 1901 - 07/19 0700  In: 65 [P.O.:660]  Out: 3030 [Urine:3030]    Physical Examination:    General: Obese   Neck:  Supple, no mass  Resp:  CTA  CV:  RRR,  no murmur or rub,chronic LE edema  GI:  Soft, NT, + Bowel sounds, no hepatosplenomegaly  Neurologic:  Non focal  Psych:             AAO x 3 appropriate affect   Skin:  Involving both LE      []    High complexity decision making was performed  []    Patient is at high-risk of decompensation with multiple organ involvement    Lab Data Personally Reviewed: I have reviewed all the pertinent labs, microbiology data and radiology studies during assessment.     Recent Labs     07/19/19  0333 07/18/19  0420 07/17/19  1155    140 138  137   K 3.8 4.2 3.9  3.9    109* 109*  109*   CO2 23 22 21  21   * 121* 152*  153*   BUN 11 13 13  13   CREA 1.34* 1.28 1.21  1.20   CA 8.4* 8.2* 8.2*  8.1*   PHOS  --   --  2.3*   ALB  --   --  1.8*     Recent Labs     07/19/19  0333 07/18/19  0420 07/17/19  1155 07/16/19  0855   WBC 14.3* 13.2* 14.0* 13.2*   HGB 10.2* 10.2* 10.3* 10.4*   HCT 32.2* 32.9* 32.1* 31.7*   * 385 392 280     No results found for: SDES  Lab Results   Component Value Date/Time    Culture result: NO GROWTH 2 DAYS 07/13/2019 07:16 AM    Culture result: NO GROWTH 5 DAYS 07/13/2019 01:08 AM    Culture result: NO GROWTH 5 DAYS 07/13/2019 01:08 AM     Recent Results (from the past 24 hour(s))   GLUCOSE, POC    Collection Time: 07/18/19 11:15 AM   Result Value Ref Range    Glucose (POC) 141 (H) 65 - 100 mg/dL    Performed by [address] [place] [address], POC    Collection Time: 07/18/19  5:39 PM   Result Value Ref Range    Glucose (POC) 136 (H) 65 - 100 mg/dL Performed by Serge Bernardo POC    Collection Time: 07/18/19  9:22 PM   Result Value Ref Range    Glucose (POC) 140 (H) 65 - 100 mg/dL    Performed by Maira Cortez    CBC WITH AUTOMATED DIFF    Collection Time: 07/19/19  3:33 AM   Result Value Ref Range    WBC 14.3 (H) 4.1 - 11.1 K/uL    RBC 3.46 (L) 4.10 - 5.70 M/uL    HGB 10.2 (L) 12.1 - 17.0 g/dL    HCT 32.2 (L) 36.6 - 50.3 %    MCV 93.1 80.0 - 99.0 FL    MCH 29.5 26.0 - 34.0 PG    MCHC 31.7 30.0 - 36.5 g/dL    RDW 13.2 11.5 - 14.5 %    PLATELET 677 (H) 891 - 400 K/uL    MPV 10.9 8.9 - 12.9 FL    NRBC 0.0 0  WBC    ABSOLUTE NRBC 0.00 0.00 - 0.01 K/uL    NEUTROPHILS 65 32 - 75 %    BAND NEUTROPHILS 2 %    LYMPHOCYTES 16 12 - 49 %    MONOCYTES 10 5 - 13 %    EOSINOPHILS 2 0 - 7 %    BASOPHILS 1 0 - 1 %    MYELOCYTES 1 %    IMMATURE GRANULOCYTES 3 (H) 0.0 - 0.5 %    ABS. NEUTROPHILS 9.6 (H) 1.8 - 8.0 K/UL    ABS. LYMPHOCYTES 2.3 0.8 - 3.5 K/UL    ABS. MONOCYTES 1.4 (H) 0.0 - 1.0 K/UL    ABS. EOSINOPHILS 0.3 0.0 - 0.4 K/UL    ABS. BASOPHILS 0.1 0.0 - 0.1 K/UL    ABS. IMM. GRANS. 0.4 (H) 0.00 - 0.04 K/UL    DF MANUAL      RBC COMMENTS POLYCHROMASIA  PRESENT       METABOLIC PANEL, BASIC    Collection Time: 07/19/19  3:33 AM   Result Value Ref Range    Sodium 140 136 - 145 mmol/L    Potassium 3.8 3.5 - 5.1 mmol/L    Chloride 108 97 - 108 mmol/L    CO2 23 21 - 32 mmol/L    Anion gap 9 5 - 15 mmol/L    Glucose 113 (H) 65 - 100 mg/dL    BUN 11 6 - 20 MG/DL    Creatinine 1.34 (H) 0.70 - 1.30 MG/DL    BUN/Creatinine ratio 8 (L) 12 - 20      GFR est AA >60 >60 ml/min/1.73m2    GFR est non-AA 54 (L) >60 ml/min/1.73m2    Calcium 8.4 (L) 8.5 - 10.1 MG/DL   GLUCOSE, POC    Collection Time: 07/19/19  6:11 AM   Result Value Ref Range    Glucose (POC) 128 (H) 65 - 100 mg/dL    Performed by Bonita Borden            Total time spent with patient:  xxx   min.                                Care Plan discussed with:  Patient     Family      RN      Consulting Physician /Specialist        I have reviewed the flowsheets. Chart and Pertinent Notes have been reviewed. No change in PMH ,family and social history from Consult note.       Rayne Cavazos MD

## 2019-07-19 NOTE — PROGRESS NOTES
7/19/19 -  ALEXANDR:  - Has declined therapies again today, 7/19  - Creatinine is slightly elevated today  - Anticipate discharge tomorrow, 7/20  [facility] has accepted patient; information is on AVS  CRM: Hendricks Epley, MPH, [address] [place] [address]; Z: [phone]

## 2019-07-19 NOTE — PROGRESS NOTES
Physical therapy:    Attempted PT session and pt declined therapy at this time. Pt reported he has already been getting up with nursing staff with the RW and walking to and from the bathroom without issue. Pt stated he prefers to keep his legs elevated and not walk \"just for the sake of walking. \"  Pt feels like he will be able to manage at home without issue. Continue to recommend HHPT and encouraged pt to continue to get up with nursing staff over the weekend with RW. Pt verbalized he is anticipating discharging home tomorrow.    Thank you    Marny Lanes, PT, DPT

## 2019-07-19 NOTE — PROGRESS NOTES
Infectious Diseases Progress Note          Subjective:     He still has prominent erythema of the left leg    Objective:     Vitals:   Visit Vitals  /70   Pulse 79   Temp 99 °F (37.2 °C)   Resp 24   Ht 5' 8\" (1.727 m)   Wt (!) 191.5 kg (422 lb 3.2 oz)   SpO2 93%   BMI 64.20 kg/m²        Tmax:  Temp (24hrs), Av.7 °F (37.1 °C), Min:97.7 °F (36.5 °C), Max:99.5 °F (37.5 °C)      Exam:  General appearance: alert, no distress  Lungs: clear to auscultation bilaterally  Heart: regular rate and rhythm  Abdomen: obese  LLE: still with severe erythema with bullae posterior calf, medial thigh and proximal lateral thigh    IV Lines: peripheral    Labs:    Recent Labs     19  0420 19  1155 19  0855 19  0745   WBC 13.2* 14.0* 13.2*  --    HGB 10.2* 10.3* 10.4*  --     392 280  --    BUN 13 13  13  --  17   CREA 1.28 1.21  1.20  --  1.33*       Assessment:     1. Acute cellulitis of the LLE -- The clinical history is c/w Streptococcal cellulitis but obesity, diabetes, and venous stasis dermatitis increase the risk of other pathogens also. He has no history of MRSA.     2. NIDDM     3. HTN     4. JOSUÉ     5. CYNDI -- baseline creatinine is not known     6. Hx of presumed DJD    Plan:     1.  Rob Cabezas MD

## 2019-07-20 LAB
ANION GAP SERPL CALC-SCNC: 8 MMOL/L (ref 5–15)
BASOPHILS # BLD: 0.1 K/UL (ref 0–0.1)
BASOPHILS NFR BLD: 0 % (ref 0–1)
BUN SERPL-MCNC: 12 MG/DL (ref 6–20)
BUN/CREAT SERPL: 10 (ref 12–20)
CALCIUM SERPL-MCNC: 8.6 MG/DL (ref 8.5–10.1)
CHLORIDE SERPL-SCNC: 109 MMOL/L (ref 97–108)
CO2 SERPL-SCNC: 23 MMOL/L (ref 21–32)
CREAT SERPL-MCNC: 1.24 MG/DL (ref 0.7–1.3)
DIFFERENTIAL METHOD BLD: ABNORMAL
EOSINOPHIL # BLD: 0.2 K/UL (ref 0–0.4)
EOSINOPHIL NFR BLD: 1 % (ref 0–7)
ERYTHROCYTE [DISTWIDTH] IN BLOOD BY AUTOMATED COUNT: 13.1 % (ref 11.5–14.5)
GLUCOSE BLD STRIP.AUTO-MCNC: 115 MG/DL (ref 65–100)
GLUCOSE BLD STRIP.AUTO-MCNC: 131 MG/DL (ref 65–100)
GLUCOSE BLD STRIP.AUTO-MCNC: 132 MG/DL (ref 65–100)
GLUCOSE BLD STRIP.AUTO-MCNC: 141 MG/DL (ref 65–100)
GLUCOSE SERPL-MCNC: 124 MG/DL (ref 65–100)
HCT VFR BLD AUTO: 29.4 % (ref 36.6–50.3)
HGB BLD-MCNC: 9.4 G/DL (ref 12.1–17)
IMM GRANULOCYTES # BLD AUTO: 0.2 K/UL (ref 0–0.04)
IMM GRANULOCYTES NFR BLD AUTO: 2 % (ref 0–0.5)
LYMPHOCYTES # BLD: 1.7 K/UL (ref 0.8–3.5)
LYMPHOCYTES NFR BLD: 13 % (ref 12–49)
MCH RBC QN AUTO: 29.8 PG (ref 26–34)
MCHC RBC AUTO-ENTMCNC: 32 G/DL (ref 30–36.5)
MCV RBC AUTO: 93.3 FL (ref 80–99)
MONOCYTES # BLD: 1.2 K/UL (ref 0–1)
MONOCYTES NFR BLD: 9 % (ref 5–13)
NEUTS SEG # BLD: 9.8 K/UL (ref 1.8–8)
NEUTS SEG NFR BLD: 75 % (ref 32–75)
NRBC # BLD: 0 K/UL (ref 0–0.01)
NRBC BLD-RTO: 0 PER 100 WBC
PLATELET # BLD AUTO: 427 K/UL (ref 150–400)
PMV BLD AUTO: 10.9 FL (ref 8.9–12.9)
POTASSIUM SERPL-SCNC: 4 MMOL/L (ref 3.5–5.1)
RBC # BLD AUTO: 3.15 M/UL (ref 4.1–5.7)
SERVICE CMNT-IMP: ABNORMAL
SODIUM SERPL-SCNC: 140 MMOL/L (ref 136–145)
WBC # BLD AUTO: 13.1 K/UL (ref 4.1–11.1)

## 2019-07-20 PROCEDURE — 36415 COLL VENOUS BLD VENIPUNCTURE: CPT

## 2019-07-20 PROCEDURE — 74011250636 HC RX REV CODE- 250/636: Performed by: FAMILY MEDICINE

## 2019-07-20 PROCEDURE — 65270000032 HC RM SEMIPRIVATE

## 2019-07-20 PROCEDURE — 82962 GLUCOSE BLOOD TEST: CPT

## 2019-07-20 PROCEDURE — 74011250637 HC RX REV CODE- 250/637: Performed by: INTERNAL MEDICINE

## 2019-07-20 PROCEDURE — 80048 BASIC METABOLIC PNL TOTAL CA: CPT

## 2019-07-20 PROCEDURE — 74011000258 HC RX REV CODE- 258: Performed by: FAMILY MEDICINE

## 2019-07-20 PROCEDURE — 85025 COMPLETE CBC W/AUTO DIFF WBC: CPT

## 2019-07-20 RX ORDER — FUROSEMIDE 20 MG/1
40 TABLET ORAL 2 TIMES DAILY
Qty: 30 TAB | Refills: 0 | Status: SHIPPED
Start: 2019-07-20 | End: 2019-07-26

## 2019-07-20 RX ORDER — AMOXICILLIN AND CLAVULANATE POTASSIUM 875; 125 MG/1; MG/1
1 TABLET, FILM COATED ORAL EVERY 12 HOURS
Qty: 12 TAB | Refills: 0 | Status: SHIPPED | OUTPATIENT
Start: 2019-07-20 | End: 2019-07-26 | Stop reason: SDUPTHER

## 2019-07-20 RX ADMIN — PIPERACILLIN AND TAZOBACTAM 3.38 G: 3; .375 INJECTION, POWDER, FOR SOLUTION INTRAVENOUS at 16:46

## 2019-07-20 RX ADMIN — PIPERACILLIN AND TAZOBACTAM 3.38 G: 3; .375 INJECTION, POWDER, FOR SOLUTION INTRAVENOUS at 00:34

## 2019-07-20 RX ADMIN — PIPERACILLIN AND TAZOBACTAM 3.38 G: 3; .375 INJECTION, POWDER, FOR SOLUTION INTRAVENOUS at 09:36

## 2019-07-20 RX ADMIN — Medication 10 ML: at 16:46

## 2019-07-20 RX ADMIN — Medication 10 ML: at 07:02

## 2019-07-20 RX ADMIN — AMLODIPINE BESYLATE 5 MG: 5 TABLET ORAL at 09:36

## 2019-07-20 RX ADMIN — Medication 10 ML: at 21:24

## 2019-07-20 RX ADMIN — POTASSIUM CHLORIDE 20 MEQ: 750 TABLET, EXTENDED RELEASE ORAL at 09:35

## 2019-07-20 RX ADMIN — FUROSEMIDE 40 MG: 40 TABLET ORAL at 09:36

## 2019-07-20 RX ADMIN — HEPARIN SODIUM 5000 UNITS: 5000 INJECTION INTRAVENOUS; SUBCUTANEOUS at 14:50

## 2019-07-20 RX ADMIN — HEPARIN SODIUM 5000 UNITS: 5000 INJECTION INTRAVENOUS; SUBCUTANEOUS at 07:02

## 2019-07-20 RX ADMIN — LISINOPRIL 10 MG: 10 TABLET ORAL at 09:35

## 2019-07-20 RX ADMIN — HEPARIN SODIUM 5000 UNITS: 5000 INJECTION INTRAVENOUS; SUBCUTANEOUS at 22:40

## 2019-07-20 NOTE — PROGRESS NOTES
Nurse receiving report on different patient from previous nurse and both were called to patient's room as patient was on the floor. As per PCT, PCT was assisting patient change underpants. The patient was sitting on the side of his specialty bed lifted his foot and slide down to the floor slowly. Nurse asked the patient and he stated same thing, was changing underpants on the side of the bed and slide to the floor because the bed sits too high. Lift team called to get patient back in the bed. Vital signs obtained and at baseline. No c/o of pain. No s/s of fracture or injury. 2120    Nurse informed Gretchen Brand NP and he came and assessed the patient. No new orders.

## 2019-07-20 NOTE — PROGRESS NOTES
Bedside and Verbal shift change report given to Peter Bullock  (oncoming nurse) by Charlie Sesay (offgoing nurse). Report included the following information SBAR and Kardex. Attempted to get pt to remove wet underwear several times earlier today but he refused even though they were saturated with urine. Stated that he only had one pair left and needed them for tomorrow. Omar Wren

## 2019-07-20 NOTE — PROGRESS NOTES
Bedside and Verbal shift change report given to Philip Gutiérrez RN (oncoming nurse) by Rylee Cole RN (offgoing nurse). Report included the following information SBAR, Kardex, ED Summary, Intake/Output, MAR and Recent Results.

## 2019-07-20 NOTE — PROGRESS NOTES
Discharge cancelled as patient became sob walking to wheelchair. Brother at bedside, concerned regarding patient taking care of himself at home. Patient has not been participating with Pt for last few days. PT/OT eval  tomorrow.

## 2019-07-20 NOTE — PROGRESS NOTES
Labs reviewed  Cr stable  Cont present care  Will f/u on Monday  Call with any questions over the weekend        Anna Steiner MD  [facility]   [address] [address]  Phone - [phone]   Fax - [phone]  www. [facility]

## 2019-07-20 NOTE — PROGRESS NOTES
Infectious Diseases Progress Note    Antibiotic [place]:  Vancomycin  --   Zosyn   -- present      Subjective:     He still has prominent erythema of the left leg but it looks better    Objective:     Vitals:   Visit Vitals  /69 (BP 1 Location: Left arm, BP Patient Position: At rest)   Pulse 92   Temp 99.1 °F (37.3 °C)   Resp 18   Ht 5' 8\" (1.727 m)   Wt (!) 164.9 kg (363 lb 8.6 oz)   SpO2 95%   BMI 55.28 kg/m²        Tmax:  Temp (24hrs), Av °F (37.2 °C), Min:98.1 °F (36.7 °C), Max:99.9 °F (37.7 °C)      Exam:  General appearance: alert, no distress  Lungs: clear to auscultation bilaterally  Heart: regular rate and rhythm  Abdomen: obese  LLE: still with severe erythema but it is less intense; bullae posterior calf, medial thigh and proximal lateral thigh look better    IV Lines: peripheral    Labs:    Recent Labs     19  0333 19  0420 19  1155   WBC 14.3* 13.2* 14.0*   HGB 10.2* 10.2* 10.3*   * 385 392   BUN 11 13 13  13   CREA 1.34* 1.28 1.21  1.20       Assessment:     1. Acute cellulitis of the LLE -- The clinical history is c/w Streptococcal cellulitis but obesity, diabetes, and venous stasis dermatitis increase the risk of other pathogens also. He has no history of MRSA.     2. NIDDM     3. HTN     4. JOSUÉ     5. CYNDI -- baseline creatinine is not known     6. Hx of presumed DJD    Plan:     1. Continue Zosyn -- anticipate Rx thru the weekend      I'll check the patient again on Monday. Please call if problems arise over the weekend.     Aldair Cosme MD

## 2019-07-21 ENCOUNTER — APPOINTMENT (OUTPATIENT)
Dept: GENERAL RADIOLOGY | Age: 64
DRG: 871 | End: 2019-07-21
Attending: INTERNAL MEDICINE
Payer: COMMERCIAL

## 2019-07-21 LAB
GLUCOSE BLD STRIP.AUTO-MCNC: 121 MG/DL (ref 65–100)
GLUCOSE BLD STRIP.AUTO-MCNC: 123 MG/DL (ref 65–100)
GLUCOSE BLD STRIP.AUTO-MCNC: 132 MG/DL (ref 65–100)
GLUCOSE BLD STRIP.AUTO-MCNC: 145 MG/DL (ref 65–100)
SERVICE CMNT-IMP: ABNORMAL

## 2019-07-21 PROCEDURE — 74011250636 HC RX REV CODE- 250/636: Performed by: INTERNAL MEDICINE

## 2019-07-21 PROCEDURE — 97530 THERAPEUTIC ACTIVITIES: CPT

## 2019-07-21 PROCEDURE — 74011000258 HC RX REV CODE- 258: Performed by: FAMILY MEDICINE

## 2019-07-21 PROCEDURE — 65270000032 HC RM SEMIPRIVATE

## 2019-07-21 PROCEDURE — 74011250636 HC RX REV CODE- 250/636: Performed by: FAMILY MEDICINE

## 2019-07-21 PROCEDURE — 74011250637 HC RX REV CODE- 250/637: Performed by: INTERNAL MEDICINE

## 2019-07-21 PROCEDURE — 74011636637 HC RX REV CODE- 636/637: Performed by: INTERNAL MEDICINE

## 2019-07-21 PROCEDURE — 71045 X-RAY EXAM CHEST 1 VIEW: CPT

## 2019-07-21 PROCEDURE — 82962 GLUCOSE BLOOD TEST: CPT

## 2019-07-21 RX ORDER — FUROSEMIDE 10 MG/ML
20 INJECTION INTRAMUSCULAR; INTRAVENOUS ONCE
Status: COMPLETED | OUTPATIENT
Start: 2019-07-21 | End: 2019-07-21

## 2019-07-21 RX ADMIN — Medication 10 ML: at 21:58

## 2019-07-21 RX ADMIN — ACETAMINOPHEN 650 MG: 325 TABLET ORAL at 21:57

## 2019-07-21 RX ADMIN — PIPERACILLIN AND TAZOBACTAM 3.38 G: 3; .375 INJECTION, POWDER, FOR SOLUTION INTRAVENOUS at 09:22

## 2019-07-21 RX ADMIN — HEPARIN SODIUM 5000 UNITS: 5000 INJECTION INTRAVENOUS; SUBCUTANEOUS at 18:04

## 2019-07-21 RX ADMIN — HEPARIN SODIUM 5000 UNITS: 5000 INJECTION INTRAVENOUS; SUBCUTANEOUS at 06:31

## 2019-07-21 RX ADMIN — FUROSEMIDE 20 MG: 10 INJECTION, SOLUTION INTRAMUSCULAR; INTRAVENOUS at 11:55

## 2019-07-21 RX ADMIN — FUROSEMIDE 40 MG: 40 TABLET ORAL at 09:22

## 2019-07-21 RX ADMIN — AMLODIPINE BESYLATE 5 MG: 5 TABLET ORAL at 09:22

## 2019-07-21 RX ADMIN — LISINOPRIL 10 MG: 10 TABLET ORAL at 09:22

## 2019-07-21 RX ADMIN — PIPERACILLIN AND TAZOBACTAM 3.38 G: 3; .375 INJECTION, POWDER, FOR SOLUTION INTRAVENOUS at 01:02

## 2019-07-21 RX ADMIN — Medication 10 ML: at 18:05

## 2019-07-21 RX ADMIN — PIPERACILLIN AND TAZOBACTAM 3.38 G: 3; .375 INJECTION, POWDER, FOR SOLUTION INTRAVENOUS at 18:04

## 2019-07-21 RX ADMIN — Medication 10 ML: at 06:00

## 2019-07-21 RX ADMIN — INSULIN LISPRO 3 UNITS: 100 INJECTION, SOLUTION INTRAVENOUS; SUBCUTANEOUS at 11:56

## 2019-07-21 RX ADMIN — POTASSIUM CHLORIDE 20 MEQ: 750 TABLET, EXTENDED RELEASE ORAL at 09:22

## 2019-07-21 NOTE — PROGRESS NOTES
Problem: Mobility Impaired (Adult and Pediatric)  Goal: *Acute Goals and Plan of Care (Insert Text)  Description  Physical Therapy Goals  Initiated 7/16/2019  1. Patient will move from supine to sit and sit to supine  in bed with modified independence within 7 day(s). 2.  Patient will perform sit to stand with modified independence within 7 day(s). 3.  Patient will ambulate with modified independence for 75 feet with the least restrictive device within 7 day(s). 4.  Patient will ascend/descend 1 step with walker with modified independence within 7 day(s). Outcome: Progressing Towards Goal    PHYSICAL THERAPY TREATMENT  Patient: Arabella Bob (37 y.o. male)  Date: 7/21/2019  Diagnosis: Sepsis ([facility] [address] [A41.9] <principal problem not specified>       Precautions: Fall, Skin  Chart, physical therapy assessment, plan of care and goals were reviewed. ASSESSMENT:  Pt seen for discharge re-assessment per MD request. Chart reviewed and note pt was initially evaluated by PT on 7/16, at which point he was ambulatory with a RW, however has declined participation in subsequent PT tx sessions. He sustained a fall this admission with nursing staff (no injury) and now reports extreme hesitation with any mobility attempts. He reports that he lives at home alone and was indep with ADLs and mobility without use of AD. Today, he required min A for supine<>sit transitions, primarily for LE management 2* extreme lymphedema and pain from cellulitis. Attempted x4 to complete sit<>stand to bariatric RW from EOB, however pt unable to obtain adequate fwd weight shift or effective push to clear buttocks from surface despite max A x2 and stabilization of RW. C/o severe bilateral ankle pain with attempts to weight bear. Worked on press ups from bed and lateral scooting however again unable to effectively bear weight and insisted on \"shimmy-ing\" laterally with hips to work up towards [facility].  Fatigues very quickly with minimal activity and demos increased WOB however SpO2 remained mid 90s on room air. Cues for deep breathing. Assisted back to bed and left with HOB elevated. At this time, discharge home alone is not a feasible option. Pt is now motivated and agreeable to work with therapy. Recommend discharge to acute IP rehab once medically clear. Progression toward goals:  ?    Improving appropriately and progressing toward goals  ? Improving slowly and progressing toward goals  ? Not making progress toward goals and plan of care will be adjusted     PLAN:  Patient continues to benefit from skilled intervention to address the above impairments. Continue treatment per established plan of care. Discharge Recommendations:  Inpatient Rehab  Further Equipment Recommendations for Discharge:  TBD      SUBJECTIVE:   Patient stated I cant believe how weak I got, I didn't understand the importance but now I do.     OBJECTIVE DATA SUMMARY:   Critical Behavior:  Neurologic State: Appropriate for age  Orientation Level: Oriented X4  Cognition: Follows commands  Safety/Judgement: Insight into deficits  Functional Mobility Training:  Bed Mobility:     Supine to Sit: Minimum assistance(for LE management)  Sit to Supine: Minimum assistance           Transfers:  Sit to Stand: (unable to stand )     Balance:  Sitting: Impaired  Sitting - Static: Good (unsupported)  Sitting - Dynamic: Fair (occasional)      Pain:  Pain Scale 1: Numeric (0 - 10)  Pain Intensity 1: 0       Activity Tolerance:   NAD    Please refer to the flowsheet for vital signs taken during this treatment. After treatment:   ?    Patient left in no apparent distress sitting up in chair  ? Patient left in no apparent distress in bed  ? Call bell left within reach  ? Nursing notified  ? Caregiver present  ?     Bed alarm activated    COMMUNICATION/COLLABORATION:   The patients plan of care was discussed with: Registered Nurse Magdalena Smith, PT, DPT   Time Calculation: 35 mins

## 2019-07-21 NOTE — PROGRESS NOTES
Hospitalist Progress Note      Hospital summary: A 82-year-old white male with past medical history of arthritis, asthma, type 2 diabetes mellitus, hypertension, morbid obesity, sleep apnea, who presented as a direct admission/transfer from [place] with complaint of redness, swelling, and pain of both the legs, left greater than right. 7/12/2019      Assessment/Plan:  Sepsis 2/2 left lower extremity cellulitis - improved  - hx chronic venous stasis changes with edema  -  lactic acidosis on POA- resolved  - leucocytosis 23.3 poa improving 13  - blood cx: NGTD  - wound cx was not sent on admission  - dced vanco 7/16.   - wound care  - appreciate ID input  - c/w IV zosyn , will change to po abx tomorrow. Acute metabolic encephalopathy-  resolved    CYNDI - improved   - nephrology on board  - cr unknown baseline  - nephro ok with lasix and lisinopril     SOB on exertion - multifactorial  - obesity, did not participate with PT in last 3 days  - CXR showed : Stable to slightly increased diffuse interstitial prominence. Correlate for developing interstitial edema  - one addiitional dose of IV lasix today     DM - A1c 6.7. C/w iss high sensitivity . Metformin on hold   HTN - c/w amlodipine, lisinopril   JOSUÉ on CPAP    Morbid Obesity Body mass index is 63.49 kg/m². weight loss recommended     Code status: Full  DVT prophylaxis: heparin   Disposition: TBD. PT/OT eval again today.   ----------------------------------------------    CC: Leg cellulitis    S: Patient is seen and examined at bedside. Feels ok. Leg erythema and edema improving. Denied fever. He was sob with minimal exertion. Patient is concerned regarding his mobility. Reassured the patient and encouraged to sit in chair. Review of Systems:  A comprehensive review of systems was negative.     O:  Visit Vitals  /80   Pulse 82   Temp 98.7 °F (37.1 °C)   Resp 18   Ht 5' 8\" (1.727 m)   Wt (!) 189.4 kg (417 lb 8.8 oz)   SpO2 93%   BMI 63.49 kg/m²       PHYSICAL EXAM:  Gen: NAD, morbid obese   HEENT: anicteric sclerae, normal conjunctiva, oropharynx clear, MM moist  Neck: supple, trachea midline, no adenopathy  Heart: RRR, no MRG, no JVD, no peripheral edema  Lungs: decreased breath sounds at bases  Abd: soft, NT, ND, BS+, no organomegaly  Extr: bilateral LE swelling - decreased. Left leg mild erythematous   Skin: dry, no rash  Neuro: CN II-XII grossly intact, normal speech, moves all extremities  Psych: normal mood, appropriate affect      Intake/Output Summary (Last 24 hours) at 7/21/2019 1055  Last data filed at 7/21/2019 7832  Gross per 24 hour   Intake 450 ml   Output 1285 ml   Net -835 ml        Recent labs & imaging reviewed:  Recent Results (from the past 24 hour(s))   GLUCOSE, POC    Collection Time: 07/20/19 11:57 AM   Result Value Ref Range    Glucose (POC) 132 (H) 65 - 100 mg/dL    Performed by Joo Grant    GLUCOSE, POC    Collection Time: 07/20/19  5:06 PM   Result Value Ref Range    Glucose (POC) 115 (H) 65 - 100 mg/dL    Performed by Shahid Ruth    GLUCOSE, POC    Collection Time: 07/20/19  9:21 PM   Result Value Ref Range    Glucose (POC) 141 (H) 65 - 100 mg/dL    Performed by Billie Olszewski, POC    Collection Time: 07/21/19  6:15 AM   Result Value Ref Range    Glucose (POC) 123 (H) 65 - 100 mg/dL    Performed by Shin Joyner     07/20/19  0402 07/19/19  0333   WBC 13.1* 14.3*   HGB 9.4* 10.2*   HCT 29.4* 32.2*   * 459*     Recent Labs     07/20/19  0402 07/19/19  0333    140   K 4.0 3.8   * 108   CO2 23 23   BUN 12 11   CREA 1.24 1.34*   * 113*   CA 8.6 8.4*     No results for input(s): SGOT, GPT, ALT, AP, TBIL, TBILI, TP, ALB, GLOB, GGT, AML, LPSE in the last 72 hours. No lab exists for component: AMYP, HLPSE  No results for input(s): INR, PTP, APTT in the last 72 hours.     No lab exists for component: INREXT, INREXT   No results for input(s): FE, TIBC, PSAT, FERR in the last 72 hours. No results found for: FOL, RBCF   No results for input(s): PH, PCO2, PO2 in the last 72 hours. No results for input(s): CPK, CKNDX, TROIQ in the last 72 hours.     No lab exists for component: CPKMB  No results found for: CHOL, CHOLX, CHLST, CHOLV, HDL, LDL, LDLC, DLDLP, TGLX, TRIGL, TRIGP, CHHD, CHHDX  Lab Results   Component Value Date/Time    Glucose (POC) 123 (H) 07/21/2019 06:15 AM    Glucose (POC) 141 (H) 07/20/2019 09:21 PM    Glucose (POC) 115 (H) 07/20/2019 05:06 PM    Glucose (POC) 132 (H) 07/20/2019 11:57 AM    Glucose (POC) 131 (H) 07/20/2019 06:30 AM     Lab Results   Component Value Date/Time    Color YELLOW/STRAW 07/13/2019 07:16 AM    Appearance CLOUDY (A) 07/13/2019 07:16 AM    Specific gravity 1.022 07/13/2019 07:16 AM    pH (UA) 5.5 07/13/2019 07:16 AM    Protein 30 (A) 07/13/2019 07:16 AM    Glucose NEGATIVE  07/13/2019 07:16 AM    Ketone TRACE (A) 07/13/2019 07:16 AM    Bilirubin NEGATIVE  07/13/2019 07:16 AM    Urobilinogen 0.2 07/13/2019 07:16 AM    Nitrites NEGATIVE  07/13/2019 07:16 AM    Leukocyte Esterase NEGATIVE  07/13/2019 07:16 AM    Epithelial cells FEW 07/13/2019 07:16 AM    Bacteria 2+ (A) 07/13/2019 07:16 AM    WBC 5-10 07/13/2019 07:16 AM    RBC 0-5 07/13/2019 07:16 AM       Med list reviewed  Current Facility-Administered Medications   Medication Dose Route Frequency    furosemide (LASIX) tablet 40 mg  40 mg Oral DAILY    insulin lispro (HUMALOG) injection   SubCUTAneous AC&HS    potassium chloride SR (KLOR-CON 10) tablet 20 mEq  20 mEq Oral DAILY    amLODIPine (NORVASC) tablet 5 mg  5 mg Oral DAILY    lisinopril (PRINIVIL, ZESTRIL) tablet 10 mg  10 mg Oral DAILY    sodium chloride (NS) flush 5-10 mL  5-10 mL IntraVENous PRN    acetaminophen (TYLENOL) tablet 650 mg  650 mg Oral Q4H PRN    piperacillin-tazobactam (ZOSYN) 3.375 g in 0.9% sodium chloride (MBP/ADV) 100 mL  3.375 g IntraVENous Q8H    sodium chloride (NS) flush 5-40 mL 5-40 mL IntraVENous Q8H    sodium chloride (NS) flush 5-40 mL  5-40 mL IntraVENous PRN    glucose chewable tablet 16 g  4 Tab Oral PRN    dextrose (D50W) injection syrg 12.5-25 g  25-50 mL IntraVENous PRN    glucagon (GLUCAGEN) injection 1 mg  1 mg IntraMUSCular PRN    heparin (porcine) injection 5,000 Units  5,000 Units SubCUTAneous Q8H    albuterol-ipratropium (DUO-NEB) 2.5 MG-0.5 MG/3 ML  3 mL Nebulization Q4H PRN       Care Plan discussed with:  Patient/Family and Nurse/    Gilda Ding MD  Internal Medicine  Date of Service: 7/21/2019

## 2019-07-21 NOTE — PROGRESS NOTES
Post Fall Documentation    Enrico Gutiérrez witnessed/unwitnessed fall occurred on 7/19/19 (Date) at 200 (Time). The answers to the following questions summarize the fall: In the patient's own words:  · What were you attempting to do when you fell? Changing my underpants with the PCT. · Do you know why you fell? The bed is too high at its lowest.  · Do you have any pain/discomfort or any other complaints? No  · Which part of your body made contact with the floor or other object? Butt  Nurse:  [facility] Was this an assisted fall? no   Was fall witnessed? Yes     [facility] [place] If witnessed, what part of the body made contact with the floor or other object? buttocks   Patients mental status after the fall/when found: Alert and oriented   Any apparent injury:  No apparent injury   Immediate interventions for injury/suspected injury? No interventions needed   Patient assisted back to bed? Mechanical lift   Name of provider notified and time, any comments? Yang Nguyen, HUM[facility] [id number]         Document Immediate VS and physical assessment in flowsheets. Document Neuro assessment every hour x 4 (for potential head injury or unwitnessed fall) in flowsheets. Dianne Cuevas.  JONO Lehman

## 2019-07-21 NOTE — PROGRESS NOTES
Attempted to stand patient this AM. 2 person assist and a walker utilized. Patient reports difficulty standing to walker and becomes short of breath. Bedside shift change report given to Shahrzad Verde RN (oncoming nurse) by Molly Rocha (offgoing nurse). Report included the following information SBAR, Kardex and MAR.

## 2019-07-22 LAB
ANION GAP SERPL CALC-SCNC: 7 MMOL/L (ref 5–15)
BASOPHILS # BLD: 0.1 K/UL (ref 0–0.1)
BASOPHILS NFR BLD: 1 % (ref 0–1)
BUN SERPL-MCNC: 12 MG/DL (ref 6–20)
BUN/CREAT SERPL: 10 (ref 12–20)
CALCIUM SERPL-MCNC: 8.5 MG/DL (ref 8.5–10.1)
CHLORIDE SERPL-SCNC: 108 MMOL/L (ref 97–108)
CO2 SERPL-SCNC: 24 MMOL/L (ref 21–32)
CREAT SERPL-MCNC: 1.24 MG/DL (ref 0.7–1.3)
DIFFERENTIAL METHOD BLD: ABNORMAL
EOSINOPHIL # BLD: 0.3 K/UL (ref 0–0.4)
EOSINOPHIL NFR BLD: 3 % (ref 0–7)
ERYTHROCYTE [DISTWIDTH] IN BLOOD BY AUTOMATED COUNT: 12.9 % (ref 11.5–14.5)
GLUCOSE BLD STRIP.AUTO-MCNC: 106 MG/DL (ref 65–100)
GLUCOSE BLD STRIP.AUTO-MCNC: 128 MG/DL (ref 65–100)
GLUCOSE BLD STRIP.AUTO-MCNC: 141 MG/DL (ref 65–100)
GLUCOSE SERPL-MCNC: 107 MG/DL (ref 65–100)
HCT VFR BLD AUTO: 30.3 % (ref 36.6–50.3)
HGB BLD-MCNC: 9.4 G/DL (ref 12.1–17)
IMM GRANULOCYTES # BLD AUTO: 0.1 K/UL (ref 0–0.04)
IMM GRANULOCYTES NFR BLD AUTO: 1 % (ref 0–0.5)
LYMPHOCYTES # BLD: 1.7 K/UL (ref 0.8–3.5)
LYMPHOCYTES NFR BLD: 17 % (ref 12–49)
MCH RBC QN AUTO: 29.3 PG (ref 26–34)
MCHC RBC AUTO-ENTMCNC: 31 G/DL (ref 30–36.5)
MCV RBC AUTO: 94.4 FL (ref 80–99)
MONOCYTES # BLD: 1 K/UL (ref 0–1)
MONOCYTES NFR BLD: 11 % (ref 5–13)
NEUTS SEG # BLD: 6.7 K/UL (ref 1.8–8)
NEUTS SEG NFR BLD: 67 % (ref 32–75)
NRBC # BLD: 0 K/UL (ref 0–0.01)
NRBC BLD-RTO: 0 PER 100 WBC
PLATELET # BLD AUTO: 417 K/UL (ref 150–400)
PMV BLD AUTO: 10.8 FL (ref 8.9–12.9)
POTASSIUM SERPL-SCNC: 4.1 MMOL/L (ref 3.5–5.1)
RBC # BLD AUTO: 3.21 M/UL (ref 4.1–5.7)
SERVICE CMNT-IMP: ABNORMAL
SODIUM SERPL-SCNC: 139 MMOL/L (ref 136–145)
WBC # BLD AUTO: 9.8 K/UL (ref 4.1–11.1)

## 2019-07-22 PROCEDURE — 97530 THERAPEUTIC ACTIVITIES: CPT

## 2019-07-22 PROCEDURE — 74011250637 HC RX REV CODE- 250/637: Performed by: INTERNAL MEDICINE

## 2019-07-22 PROCEDURE — 85025 COMPLETE CBC W/AUTO DIFF WBC: CPT

## 2019-07-22 PROCEDURE — 80048 BASIC METABOLIC PNL TOTAL CA: CPT

## 2019-07-22 PROCEDURE — 74011250636 HC RX REV CODE- 250/636: Performed by: FAMILY MEDICINE

## 2019-07-22 PROCEDURE — 65270000032 HC RM SEMIPRIVATE

## 2019-07-22 PROCEDURE — 74011636637 HC RX REV CODE- 636/637: Performed by: INTERNAL MEDICINE

## 2019-07-22 PROCEDURE — 74011000258 HC RX REV CODE- 258: Performed by: FAMILY MEDICINE

## 2019-07-22 PROCEDURE — 82962 GLUCOSE BLOOD TEST: CPT

## 2019-07-22 PROCEDURE — 36415 COLL VENOUS BLD VENIPUNCTURE: CPT

## 2019-07-22 PROCEDURE — 97535 SELF CARE MNGMENT TRAINING: CPT

## 2019-07-22 RX ORDER — AMOXICILLIN AND CLAVULANATE POTASSIUM 875; 125 MG/1; MG/1
1 TABLET, FILM COATED ORAL EVERY 12 HOURS
Status: DISCONTINUED | OUTPATIENT
Start: 2019-07-22 | End: 2019-07-23

## 2019-07-22 RX ADMIN — POTASSIUM CHLORIDE 20 MEQ: 750 TABLET, EXTENDED RELEASE ORAL at 09:23

## 2019-07-22 RX ADMIN — HEPARIN SODIUM 5000 UNITS: 5000 INJECTION INTRAVENOUS; SUBCUTANEOUS at 09:24

## 2019-07-22 RX ADMIN — HEPARIN SODIUM 5000 UNITS: 5000 INJECTION INTRAVENOUS; SUBCUTANEOUS at 19:17

## 2019-07-22 RX ADMIN — ACETAMINOPHEN 650 MG: 325 TABLET ORAL at 13:13

## 2019-07-22 RX ADMIN — INSULIN LISPRO 3 UNITS: 100 INJECTION, SOLUTION INTRAVENOUS; SUBCUTANEOUS at 11:30

## 2019-07-22 RX ADMIN — LISINOPRIL 10 MG: 10 TABLET ORAL at 09:23

## 2019-07-22 RX ADMIN — PIPERACILLIN AND TAZOBACTAM 3.38 G: 3; .375 INJECTION, POWDER, FOR SOLUTION INTRAVENOUS at 09:23

## 2019-07-22 RX ADMIN — Medication 10 ML: at 06:00

## 2019-07-22 RX ADMIN — PIPERACILLIN AND TAZOBACTAM 3.38 G: 3; .375 INJECTION, POWDER, FOR SOLUTION INTRAVENOUS at 02:30

## 2019-07-22 RX ADMIN — FUROSEMIDE 40 MG: 40 TABLET ORAL at 09:24

## 2019-07-22 RX ADMIN — AMOXICILLIN AND CLAVULANATE POTASSIUM 1 TABLET: 875; 125 TABLET, FILM COATED ORAL at 19:17

## 2019-07-22 RX ADMIN — HEPARIN SODIUM 5000 UNITS: 5000 INJECTION INTRAVENOUS; SUBCUTANEOUS at 02:00

## 2019-07-22 RX ADMIN — AMLODIPINE BESYLATE 5 MG: 5 TABLET ORAL at 09:24

## 2019-07-22 NOTE — ROUTINE PROCESS
Bedside and Verbal shift change report given to Lesvia Soto RN (oncoming nurse) by Rachid Watson (offgoing nurse). Report included the following information SBAR, Kardex, Intake/Output, MAR and Recent Results.

## 2019-07-22 NOTE — PROGRESS NOTES
Hospitalist Progress Note      Hospital summary: A 72-year-old white male with past medical history of arthritis, asthma, type 2 diabetes mellitus, hypertension, morbid obesity, sleep apnea, who presented as a direct admission/transfer from [place] with complaint of redness, swelling, and pain of both the legs, left greater than right. 7/12/2019      Assessment/Plan:  Sepsis 2/2 left lower extremity cellulitis - improved  - hx chronic venous stasis changes with edema  -  lactic acidosis on POA- resolved  - leucocytosis 23.3 poa improving 13  - blood cx: NGTD  - wound cx was not sent on admission  - dced vanco 7/16.   - wound care  - appreciate ID input  - changed  IV zosyn to po augmentin 6/24    Acute metabolic encephalopathy-  resolved    CYNDI - improved   - nephrology on board  - cr unknown baseline  - nephro ok with lasix and lisinopril     SOB on exertion - multifactorial 7/20 - improving   - obesity, did not participate with PT in last 3 days  - CXR showed : Stable to slightly increased diffuse interstitial prominence. Correlate for developing interstitial edema  - one addiitional dose of IV lasix 7/21    DM - A1c 6.7. C/w iss high sensitivity . Metformin on hold   HTN - c/w amlodipine, lisinopril   JOSUÉ on CPAP    Morbid Obesity Body mass index is 62.45 kg/m². weight loss recommended     Code status: Full  DVT prophylaxis: heparin   Disposition: TBD. Inpatient rehab. CM working on [facility]. Waiting for insurance auth.   ----------------------------------------------    CC: Leg cellulitis    S: Patient is seen and examined at bedside. He feels better today. Leg erythema and edema improving. Denied fever. He agreed to rehab. Review of Systems:  A comprehensive review of systems was negative.     O:  Visit Vitals  /75   Pulse 81   Temp 97.9 °F (36.6 °C)   Resp 20   Ht 5' 8\" (1.727 m)   Wt (!) 186.3 kg (410 lb 11.5 oz)   SpO2 96%   BMI 62.45 kg/m²       PHYSICAL EXAM:  Gen: NAD, morbid obese   HEENT: anicteric sclerae, normal conjunctiva, oropharynx clear, MM moist  Neck: supple, trachea midline, no adenopathy  Heart: RRR, no MRG, no JVD, no peripheral edema  Lungs: decreased breath sounds at bases  Abd: soft, NT, ND, BS+, no organomegaly  Extr: bilateral LE swelling - decreased. Left leg mild erythematous   Skin: dry, no rash  Neuro: CN II-XII grossly intact, normal speech, moves all extremities  Psych: normal mood, appropriate affect      Intake/Output Summary (Last 24 hours) at 7/22/2019 1318  Last data filed at 7/22/2019 1159  Gross per 24 hour   Intake 240 ml   Output 2600 ml   Net -2360 ml        Recent labs & imaging reviewed:  Recent Results (from the past 24 hour(s))   GLUCOSE, POC    Collection Time: 07/21/19  5:00 PM   Result Value Ref Range    Glucose (POC) 132 (H) 65 - 100 mg/dL    Performed by Mandi Pringle, POC    Collection Time: 07/21/19  9:55 PM   Result Value Ref Range    Glucose (POC) 121 (H) 65 - 100 mg/dL    Performed by JOCELYNE CORDOVA    CBC WITH AUTOMATED DIFF    Collection Time: 07/22/19  3:58 AM   Result Value Ref Range    WBC 9.8 4.1 - 11.1 K/uL    RBC 3.21 (L) 4.10 - 5.70 M/uL    HGB 9.4 (L) 12.1 - 17.0 g/dL    HCT 30.3 (L) 36.6 - 50.3 %    MCV 94.4 80.0 - 99.0 FL    MCH 29.3 26.0 - 34.0 PG    MCHC 31.0 30.0 - 36.5 g/dL    RDW 12.9 11.5 - 14.5 %    PLATELET 444 (H) 318 - 400 K/uL    MPV 10.8 8.9 - 12.9 FL    NRBC 0.0 0  WBC    ABSOLUTE NRBC 0.00 0.00 - 0.01 K/uL    NEUTROPHILS 67 32 - 75 %    LYMPHOCYTES 17 12 - 49 %    MONOCYTES 11 5 - 13 %    EOSINOPHILS 3 0 - 7 %    BASOPHILS 1 0 - 1 %    IMMATURE GRANULOCYTES 1 (H) 0.0 - 0.5 %    ABS. NEUTROPHILS 6.7 1.8 - 8.0 K/UL    ABS. LYMPHOCYTES 1.7 0.8 - 3.5 K/UL    ABS. MONOCYTES 1.0 0.0 - 1.0 K/UL    ABS. EOSINOPHILS 0.3 0.0 - 0.4 K/UL    ABS. BASOPHILS 0.1 0.0 - 0.1 K/UL    ABS. IMM.  GRANS. 0.1 (H) 0.00 - 0.04 K/UL    DF AUTOMATED     METABOLIC PANEL, BASIC    Collection Time: 07/22/19  3:58 AM   Result Value Ref Range    Sodium 139 136 - 145 mmol/L    Potassium 4.1 3.5 - 5.1 mmol/L    Chloride 108 97 - 108 mmol/L    CO2 24 21 - 32 mmol/L    Anion gap 7 5 - 15 mmol/L    Glucose 107 (H) 65 - 100 mg/dL    BUN 12 6 - 20 MG/DL    Creatinine 1.24 0.70 - 1.30 MG/DL    BUN/Creatinine ratio 10 (L) 12 - 20      GFR est AA >60 >60 ml/min/1.73m2    GFR est non-AA 59 (L) >60 ml/min/1.73m2    Calcium 8.5 8.5 - 10.1 MG/DL   GLUCOSE, POC    Collection Time: 07/22/19  6:18 AM   Result Value Ref Range    Glucose (POC) 106 (H) 65 - 100 mg/dL    Performed by Vic Julian, POC    Collection Time: 07/22/19 11:01 AM   Result Value Ref Range    Glucose (POC) 141 (H) 65 - 100 mg/dL    Performed by Henrry Felder      Recent Labs     07/22/19 0358 07/20/19  0402   WBC 9.8 13.1*   HGB 9.4* 9.4*   HCT 30.3* 29.4*   * 427*     Recent Labs     07/22/19 0358 07/20/19  0402    140   K 4.1 4.0    109*   CO2 24 23   BUN 12 12   CREA 1.24 1.24   * 124*   CA 8.5 8.6     No results for input(s): SGOT, GPT, ALT, AP, TBIL, TBILI, TP, ALB, GLOB, GGT, AML, LPSE in the last 72 hours. No lab exists for component: AMYP, HLPSE  No results for input(s): INR, PTP, APTT in the last 72 hours. No lab exists for component: INREXT, INREXT   No results for input(s): FE, TIBC, PSAT, FERR in the last 72 hours. No results found for: FOL, RBCF   No results for input(s): PH, PCO2, PO2 in the last 72 hours. No results for input(s): CPK, CKNDX, TROIQ in the last 72 hours.     No lab exists for component: CPKMB  No results found for: CHOL, CHOLX, CHLST, CHOLV, HDL, LDL, LDLC, DLDLP, TGLX, TRIGL, TRIGP, CHHD, CHHDX  Lab Results   Component Value Date/Time    Glucose (POC) 141 (H) 07/22/2019 11:01 AM    Glucose (POC) 106 (H) 07/22/2019 06:18 AM    Glucose (POC) 121 (H) 07/21/2019 09:55 PM    Glucose (POC) 132 (H) 07/21/2019 05:00 PM    Glucose (POC) 145 (H) 07/21/2019 11:10 AM     Lab Results Component Value Date/Time    Color YELLOW/STRAW 07/13/2019 07:16 AM    Appearance CLOUDY (A) 07/13/2019 07:16 AM    Specific gravity 1.022 07/13/2019 07:16 AM    pH (UA) 5.5 07/13/2019 07:16 AM    Protein 30 (A) 07/13/2019 07:16 AM    Glucose NEGATIVE  07/13/2019 07:16 AM    Ketone TRACE (A) 07/13/2019 07:16 AM    Bilirubin NEGATIVE  07/13/2019 07:16 AM    Urobilinogen 0.2 07/13/2019 07:16 AM    Nitrites NEGATIVE  07/13/2019 07:16 AM    Leukocyte Esterase NEGATIVE  07/13/2019 07:16 AM    Epithelial cells FEW 07/13/2019 07:16 AM    Bacteria 2+ (A) 07/13/2019 07:16 AM    WBC 5-10 07/13/2019 07:16 AM    RBC 0-5 07/13/2019 07:16 AM       Med list reviewed  Current Facility-Administered Medications   Medication Dose Route Frequency    furosemide (LASIX) tablet 40 mg  40 mg Oral DAILY    insulin lispro (HUMALOG) injection   SubCUTAneous AC&HS    potassium chloride SR (KLOR-CON 10) tablet 20 mEq  20 mEq Oral DAILY    amLODIPine (NORVASC) tablet 5 mg  5 mg Oral DAILY    lisinopril (PRINIVIL, ZESTRIL) tablet 10 mg  10 mg Oral DAILY    sodium chloride (NS) flush 5-10 mL  5-10 mL IntraVENous PRN    acetaminophen (TYLENOL) tablet 650 mg  650 mg Oral Q4H PRN    piperacillin-tazobactam (ZOSYN) 3.375 g in 0.9% sodium chloride (MBP/ADV) 100 mL  3.375 g IntraVENous Q8H    sodium chloride (NS) flush 5-40 mL  5-40 mL IntraVENous Q8H    sodium chloride (NS) flush 5-40 mL  5-40 mL IntraVENous PRN    glucose chewable tablet 16 g  4 Tab Oral PRN    dextrose (D50W) injection syrg 12.5-25 g  25-50 mL IntraVENous PRN    glucagon (GLUCAGEN) injection 1 mg  1 mg IntraMUSCular PRN    heparin (porcine) injection 5,000 Units  5,000 Units SubCUTAneous Q8H    albuterol-ipratropium (DUO-NEB) 2.5 MG-0.5 MG/3 ML  3 mL Nebulization Q4H PRN       Care Plan discussed with:  Patient/Family and Nurse/ CM    Jannie Navarro MD  Internal Medicine  Date of Service: 7/22/2019

## 2019-07-22 NOTE — PROGRESS NOTES
Problem: Self Care Deficits Care Plan (Adult)  Goal: *Acute Goals and Plan of Care (Insert Text)  Description  Occupational Therapy Goals  Initiated 7/16/2019  1. Patient will perform grooming standing at sink for 5 min without fatigue or LOB with supervision/set-up within 7 day(s). 2.  Patient will perform lower body dressing with supervision/set-up using AE PRN within 7 day(s). 3.  Patient will perform bathing with supervision/set-up within 7 day(s). 4.  Patient will perform toilet transfers with supervision/set-up within 7 day(s). 5.  Patient will perform all aspects of toileting with supervision/set-up using AE PRN within 7 day(s). 6.  Patient will participate in upper extremity therapeutic exercise/activities with supervision/set-up for 10 minutes within 7 day(s). 7.  Patient will utilize energy conservation techniques during functional activities with verbal cues within 7 day(s). Outcome: Not Met   OCCUPATIONAL THERAPY TREATMENT  Patient: Adiel Carlton (67 y.o. male)  Date: 7/22/2019  Diagnosis: Sepsis ([facility] [address] [A41.9] <principal problem not specified>       Precautions: Fall, Skin  Chart, occupational therapy assessment, plan of care, and goals were reviewed. ASSESSMENT:  Pt was received supine in bed and agreeable to OT. Pt tolerated bed-chair position for balance training/ forward flexion exercise for short time only due to pain at BLE with legs in dependent position. When bed transitioned to flat position pt able to participate in bed mobility training set up level (including supine scoot to [facility] with additional time using BUE at bed rails). Pt completed BUE moderate resistance there-ex (red band is in room). Recommend rehabilitation program next level of care as pt demonstrates acute decline from baseline independence levels related to recent medical course and pain related to cellulitis. OT instructed pt to complete UB HEP again later today.   Progression toward goals:  ?       Improving appropriately and progressing toward goals  ? Improving slowly and progressing toward goals  ? Not making progress toward goals and plan of care will be adjusted     PLAN:  Patient continues to benefit from skilled intervention to address the above impairments. Continue treatment per established plan of care. Discharge Recommendations:  Inpatient Rehab  Further Equipment Recommendations for Discharge:  TBD at rehab pending functional progress      SUBJECTIVE:   Patient stated Arina Fonseca you very much.     OBJECTIVE DATA SUMMARY:   Cognitive/Behavioral Status:  Neurologic State: Alert  Orientation Level: Oriented X4  Cognition: Follows commands  Perception: Appears intact  Perseveration: No perseveration noted  Safety/Judgement: Awareness of environment; Insight into deficits    Functional Mobility and Transfers for ADLs:  Bed Mobility:  Rolling: Setup; Additional time; Adaptive equipment    Transfers:             Balance:  Sitting: Impaired  Sitting - Static: Supported sitting; Fair (occasional)(Bed-chair position)    ADL Intervention:                                     Cognitive Retraining  Safety/Judgement: Awareness of environment; Insight into deficits               Therapeutic Exercises:   20 reps x 1 set with moderate (red) Theraband Bilateral: Bicep curls, Shoulder abduction, Horizontal shoulder abduction, Tricep extensions  Pain:  Pain Scale 1: Numeric (0 - 10)  Pain Intensity 1: 0              Activity Tolerance:   Fair; LE pain is primary barrier with generalized weakness. After treatment:   ? Patient left in no apparent distress sitting up in chair  ? Patient left in no apparent distress in bed  ? Call bell left within reach  ? Nursing notified  ? Caregiver present  ?  Bed alarm activated    COMMUNICATION/COLLABORATION:   The patients plan of care was discussed with: Registered Nurse    Queta Barnes  Time Calculation: 28 mins

## 2019-07-22 NOTE — PROGRESS NOTES
[facility]   [address] [facility], [address] [place] [address], [facility]  Phone: [phone]   [phone]       Nephrology Progress Note  Rubia Jorge     1955     [id number]  Date of Admission : 7/12/2019 07/22/19    CC:  Follow up for CYNDI       Assessment and Plan   CYNDI   - resolved and stable   - continue losartan and lasix   - No out pt f/u needed    CKD - ?? baseline     Chronic venous insufficiency/dermatitis/wounds  -per Dr Sangeetha Mckeon      HTN, relative hypotension     DM     Obesity, JOSUÉ     Interval History:    Doing well   Good UOP after dose of IV lasix   Edema and erythema in legs much improved   No N/V/CP/SOB    Review of Systems: Pertinent items are noted in HPI.     Current Medications:   Current Facility-Administered Medications   Medication Dose Route Frequency    furosemide (LASIX) tablet 40 mg  40 mg Oral DAILY    insulin lispro (HUMALOG) injection   SubCUTAneous AC&HS    potassium chloride SR (KLOR-CON 10) tablet 20 mEq  20 mEq Oral DAILY    amLODIPine (NORVASC) tablet 5 mg  5 mg Oral DAILY    lisinopril (PRINIVIL, ZESTRIL) tablet 10 mg  10 mg Oral DAILY    sodium chloride (NS) flush 5-10 mL  5-10 mL IntraVENous PRN    acetaminophen (TYLENOL) tablet 650 mg  650 mg Oral Q4H PRN    piperacillin-tazobactam (ZOSYN) 3.375 g in 0.9% sodium chloride (MBP/ADV) 100 mL  3.375 g IntraVENous Q8H    sodium chloride (NS) flush 5-40 mL  5-40 mL IntraVENous Q8H    sodium chloride (NS) flush 5-40 mL  5-40 mL IntraVENous PRN    glucose chewable tablet 16 g  4 Tab Oral PRN    dextrose (D50W) injection syrg 12.5-25 g  25-50 mL IntraVENous PRN    glucagon (GLUCAGEN) injection 1 mg  1 mg IntraMUSCular PRN    heparin (porcine) injection 5,000 Units  5,000 Units SubCUTAneous Q8H    albuterol-ipratropium (DUO-NEB) 2.5 MG-0.5 MG/3 ML  3 mL Nebulization Q4H PRN      Allergies   Allergen Reactions    Phenobarbital Unknown (comments)       Objective:  Vitals:    Vitals:    07/21/19 2027 07/21/19 2342 07/22/19 0743 07/22/19 0922   BP: 112/66 115/58 120/75    Pulse: 78 80 81    Resp: 20 22 20    Temp: 99.1 °F (37.3 °C) 98.9 °F (37.2 °C) 97.9 °F (36.6 °C)    SpO2: 95% 95% 96%    Weight:    (!) 186.3 kg (410 lb 11.5 oz)   Height:         Intake and Output:  07/22 0701 - 07/22 1900  In: 240 [P.O.:240]  Out: 750 [Urine:750]  07/20 1901 - 07/22 0700  In: -   Out: 4771 [Urine:3685]    Physical Examination:    General: Obese   Neck:  Supple, no mass  Resp:  CTA  CV:  RRR,  no murmur or rub,chronic LE edema  GI:  Soft, NT, + Bowel sounds, no hepatosplenomegaly  Neurologic:  Non focal  Psych:             AAO x 3 appropriate affect   Skin:  Involving both LE      []    High complexity decision making was performed  []    Patient is at high-risk of decompensation with multiple organ involvement    Lab Data Personally Reviewed: I have reviewed all the pertinent labs, microbiology data and radiology studies during assessment.     Recent Labs     07/22/19  0358 07/20/19  0402    140   K 4.1 4.0    109*   CO2 24 23   * 124*   BUN 12 12   CREA 1.24 1.24   CA 8.5 8.6     Recent Labs     07/22/19  0358 07/20/19  0402   WBC 9.8 13.1*   HGB 9.4* 9.4*   HCT 30.3* 29.4*   * 427*     No results found for: SDES  Lab Results   Component Value Date/Time    Culture result: NO GROWTH 2 DAYS 07/13/2019 07:16 AM    Culture result: NO GROWTH 5 DAYS 07/13/2019 01:08 AM    Culture result: NO GROWTH 5 DAYS 07/13/2019 01:08 AM     Recent Results (from the past 24 hour(s))   GLUCOSE, POC    Collection Time: 07/21/19  5:00 PM   Result Value Ref Range    Glucose (POC) 132 (H) 65 - 100 mg/dL    Performed by Gallito Aranda, POC    Collection Time: 07/21/19  9:55 PM   Result Value Ref Range    Glucose (POC) 121 (H) 65 - 100 mg/dL    Performed by CASANOVA TASHA    CBC WITH AUTOMATED DIFF    Collection Time: 07/22/19  3:58 AM   Result Value Ref Range    WBC 9.8 4.1 - 11.1 K/uL    RBC 3.21 (L) 4.10 - 5.70 M/uL    HGB 9.4 (L) 12.1 - 17.0 g/dL    HCT 30.3 (L) 36.6 - 50.3 %    MCV 94.4 80.0 - 99.0 FL    MCH 29.3 26.0 - 34.0 PG    MCHC 31.0 30.0 - 36.5 g/dL    RDW 12.9 11.5 - 14.5 %    PLATELET 821 (H) 172 - 400 K/uL    MPV 10.8 8.9 - 12.9 FL    NRBC 0.0 0  WBC    ABSOLUTE NRBC 0.00 0.00 - 0.01 K/uL    NEUTROPHILS 67 32 - 75 %    LYMPHOCYTES 17 12 - 49 %    MONOCYTES 11 5 - 13 %    EOSINOPHILS 3 0 - 7 %    BASOPHILS 1 0 - 1 %    IMMATURE GRANULOCYTES 1 (H) 0.0 - 0.5 %    ABS. NEUTROPHILS 6.7 1.8 - 8.0 K/UL    ABS. LYMPHOCYTES 1.7 0.8 - 3.5 K/UL    ABS. MONOCYTES 1.0 0.0 - 1.0 K/UL    ABS. EOSINOPHILS 0.3 0.0 - 0.4 K/UL    ABS. BASOPHILS 0.1 0.0 - 0.1 K/UL    ABS. IMM. GRANS. 0.1 (H) 0.00 - 0.04 K/UL    DF AUTOMATED     METABOLIC PANEL, BASIC    Collection Time: 07/22/19  3:58 AM   Result Value Ref Range    Sodium 139 136 - 145 mmol/L    Potassium 4.1 3.5 - 5.1 mmol/L    Chloride 108 97 - 108 mmol/L    CO2 24 21 - 32 mmol/L    Anion gap 7 5 - 15 mmol/L    Glucose 107 (H) 65 - 100 mg/dL    BUN 12 6 - 20 MG/DL    Creatinine 1.24 0.70 - 1.30 MG/DL    BUN/Creatinine ratio 10 (L) 12 - 20      GFR est AA >60 >60 ml/min/1.73m2    GFR est non-AA 59 (L) >60 ml/min/1.73m2    Calcium 8.5 8.5 - 10.1 MG/DL   GLUCOSE, POC    Collection Time: 07/22/19  6:18 AM   Result Value Ref Range    Glucose (POC) 106 (H) 65 - 100 mg/dL    Performed by Vic Julian, POC    Collection Time: 07/22/19 11:01 AM   Result Value Ref Range    Glucose (POC) 141 (H) 65 - 100 mg/dL    Performed by Nicole Botello            Total time spent with patient:  xxx   min. Care Plan discussed with:  Patient     Family      RN      Consulting Physician [facility],         I have reviewed the flowsheets. Chart and Pertinent Notes have been reviewed. No change in PMH ,family and social history from Consult note.       Theodore Conde MD

## 2019-07-22 NOTE — PROGRESS NOTES
Problem: Mobility Impaired (Adult and Pediatric)  Goal: *Acute Goals and Plan of Care (Insert Text)  Description  Physical Therapy Goals  Initiated 7/16/2019  1. Patient will move from supine to sit and sit to supine  in bed with modified independence within 7 day(s). 2.  Patient will perform sit to stand with modified independence within 7 day(s). 3.  Patient will ambulate with modified independence for 75 feet with the least restrictive device within 7 day(s). 4.  Patient will ascend/descend 1 step with walker with modified independence within 7 day(s). Outcome: Progressing Towards Goal      PHYSICAL THERAPY TREATMENT  Patient: Tosin Bey (89 y.o. male)  Date: 7/22/2019  Diagnosis: Sepsis ([facility] [A41.9] <principal problem not specified>       Precautions: Fall, Skin  Chart, physical therapy assessment, plan of care and goals were reviewed. ASSESSMENT:  Pt demos increased participation today with max encouragement and rationale for particpation. Pt able to get to EOB with min A from HHA and bedrails. Pt c/o tenderness in feet from fluid retention and joint soreness. Pt educated regarding needs to mobilize. Able to initiate standing twice and abandoned prior to liftoff from bed, pt brother arrived and provided moral and physical support, and able to achieve standing in flexed position over RW for approx 15 seconds before sitting, which is a great improvement over last sessions. Pt required assist to get back into bed for BLE, but rolled and boosted self with encouragement and instructions x2 to re-don sheets and genet and get into position. Educated patient that he needs to begin to sit upright for meals and to place self in hooklying and attempt bridging for exercise. Pt also educated in ankle pumps and that he needs to perform as often as possible to facilitate fluid loss. Progression toward goals:  ?    Improving appropriately and progressing toward goals  ?     Improving slowly and progressing toward goals  ? Not making progress toward goals and plan of care will be adjusted     PLAN:  Patient continues to benefit from skilled intervention to address the above impairments. Continue treatment per established plan of care. Discharge Recommendations:  Inpatient Rehab  Further Equipment Recommendations for Discharge:  rolling walker     SUBJECTIVE:   Patient stated I just cant tolerate my weight through my feet.     OBJECTIVE DATA SUMMARY:   Critical Behavior:  Neurologic State: Alert  Orientation Level: Oriented X4  Cognition: Follows commands  Safety/Judgement: Awareness of environment, Insight into deficits  Functional Mobility Training:  Bed Mobility:  Rolling: Setup; Additional time;Bed Modified  Supine to Sit: Minimum assistance; Additional time;Bed Modified  Sit to Supine: Moderate assistance;Assist x1(assist BLE)           Transfers:  Sit to Stand: Maximum assistance;Assist x2; Additional time  Stand to Sit: Minimum assistance; Additional time                             Balance:  Sitting: Impaired; Without support  Sitting - Static: Fair (occasional)  Sitting - Dynamic: Fair (occasional)  Standing: Impaired  Standing - Static: Poor  Standing - Dynamic : Not tested  Ambulation/Gait Training:                                                   Therapeutic Exercises:   Bridging, AP sets of 10  Pain:  Pain Scale 1: Numeric (0 - 10)  Pain Intensity 1: 0 at rest, 8/10 with standing in legs              Activity Tolerance:   fair  Please refer to the flowsheet for vital signs taken during this treatment. After treatment:   ?    Patient left in no apparent distress sitting up in chair  ? Patient left in no apparent distress in bed  ? Call bell left within reach  ? Nursing notified  ? Caregiver present  ?     Bed alarm activated    COMMUNICATION/COLLABORATION:   The patients plan of care was discussed with: Registered Nurse and     Jez Wright, PT   Time Calculation: 35 mins

## 2019-07-22 NOTE — PROGRESS NOTES
NUTRITION  Pt seen for:     [x]          LOS  RECOMMENDATIONS:   None at this time. SUBJECTIVE/OBJECTIVE:   Pt admitted for sepsis 2/2 LE cellulitis. Abx in place with plans to switch to PO abx today per MD notes. Appetite good and no wt loss noted. At low nutrition risk at this time. If pt ready for change recommend referral to outpatient RD at discharge for weight loss education. Will rescreen per protocol.      Diet:  Consistent CHO  Supplements: none  Intake: [x]           Good     []           Fair      []           Poor   Patient Vitals for the past 100 hrs:   % Diet Eaten   07/20/19 1450 90 %   07/20/19 1054 80 %   07/19/19 1734 75 %   07/18/19 1200 100 %   07/18/19 0900 100 %     Weight Changes:   [x]            Stable  Wt Readings from Last 10 Encounters:   07/21/19 (!) 189.4 kg (417 lb 8.8 oz)   05/01/19 (!) 190.5 kg (420 lb)     Nutrition Problems Identified:  [x]      None    PLAN:   []           Obtained/adjusted food preferences/tolerances and/or snacks options   []           Dislikes supplements will try a substitution   []           Modify diet for food allergies  []           Adjust texture due to difficulty chewing   [x]   Continue current diet with adjustment for estimated calorie needs  []           Educated patient  []           Add Supplements    Rescreen:  []            At Nutrition Risk           [x]            Not at [address], rescreen per screening protocol    Goldie Redding, RD [address] , Pager #[phone] or [phone]

## 2019-07-23 LAB
GLUCOSE BLD STRIP.AUTO-MCNC: 110 MG/DL (ref 65–100)
GLUCOSE BLD STRIP.AUTO-MCNC: 115 MG/DL (ref 65–100)
GLUCOSE BLD STRIP.AUTO-MCNC: 125 MG/DL (ref 65–100)
GLUCOSE BLD STRIP.AUTO-MCNC: 131 MG/DL (ref 65–100)
GLUCOSE BLD STRIP.AUTO-MCNC: 138 MG/DL (ref 65–100)
SERVICE CMNT-IMP: ABNORMAL

## 2019-07-23 PROCEDURE — 97530 THERAPEUTIC ACTIVITIES: CPT

## 2019-07-23 PROCEDURE — 97535 SELF CARE MNGMENT TRAINING: CPT

## 2019-07-23 PROCEDURE — 77030028894 HC DRSG MEDIH CA ALG INLC -B

## 2019-07-23 PROCEDURE — 74011250637 HC RX REV CODE- 250/637: Performed by: INTERNAL MEDICINE

## 2019-07-23 PROCEDURE — 82962 GLUCOSE BLOOD TEST: CPT

## 2019-07-23 PROCEDURE — 65270000032 HC RM SEMIPRIVATE

## 2019-07-23 PROCEDURE — 74011250636 HC RX REV CODE- 250/636: Performed by: FAMILY MEDICINE

## 2019-07-23 PROCEDURE — 97116 GAIT TRAINING THERAPY: CPT

## 2019-07-23 RX ADMIN — HEPARIN SODIUM 5000 UNITS: 5000 INJECTION INTRAVENOUS; SUBCUTANEOUS at 17:32

## 2019-07-23 RX ADMIN — ACETAMINOPHEN 650 MG: 325 TABLET ORAL at 23:27

## 2019-07-23 RX ADMIN — POTASSIUM CHLORIDE 20 MEQ: 750 TABLET, EXTENDED RELEASE ORAL at 09:30

## 2019-07-23 RX ADMIN — AMLODIPINE BESYLATE 5 MG: 5 TABLET ORAL at 09:30

## 2019-07-23 RX ADMIN — Medication 10 ML: at 00:37

## 2019-07-23 RX ADMIN — FUROSEMIDE 40 MG: 40 TABLET ORAL at 09:30

## 2019-07-23 RX ADMIN — ACETAMINOPHEN 650 MG: 325 TABLET ORAL at 09:30

## 2019-07-23 RX ADMIN — ACETAMINOPHEN 650 MG: 325 TABLET ORAL at 00:29

## 2019-07-23 RX ADMIN — Medication 10 ML: at 22:00

## 2019-07-23 RX ADMIN — AMOXICILLIN AND CLAVULANATE POTASSIUM 1 TABLET: 875; 125 TABLET, FILM COATED ORAL at 20:17

## 2019-07-23 RX ADMIN — HEPARIN SODIUM 5000 UNITS: 5000 INJECTION INTRAVENOUS; SUBCUTANEOUS at 02:22

## 2019-07-23 RX ADMIN — LISINOPRIL 10 MG: 10 TABLET ORAL at 09:30

## 2019-07-23 RX ADMIN — HEPARIN SODIUM 5000 UNITS: 5000 INJECTION INTRAVENOUS; SUBCUTANEOUS at 09:30

## 2019-07-23 RX ADMIN — AMOXICILLIN AND CLAVULANATE POTASSIUM 1 TABLET: 875; 125 TABLET, FILM COATED ORAL at 09:30

## 2019-07-23 NOTE — PROGRESS NOTES
7/23/19; 08:45 -   CM contacted [facility] Felisha Larry: [phone]) and left  requesting a return call to discuss referral and patient's placement in IPR facility. CRM: Mendoza Lee, MPH, [facility]; Z: [phone]    14:40 -   CM received update from [facility] that IPR MD is reviewing patient's case today. Patient needs updated PT and OT notes that clearly demonstrate therapy progression and participation. Josie Lomeli is to meet with patient today, 7/23. It is projected that ins Estiven Gonzalez will be in place tomorrow, 7/24.   CRM: Mendoza Lee, MPH, [address] [place] [address]; Z: [phone]

## 2019-07-23 NOTE — PROGRESS NOTES
Bedside shift change report given to [facility] (oncoming nurse) by Harika Pink RN (offgoing nurse). Report included the following information SBAR, Kardex, Procedure Summary, Intake/Output, MAR and Recent Results.

## 2019-07-23 NOTE — PROGRESS NOTES
Problem: Patient Education: Go to Patient Education Activity  Goal: Patient/Family Education  Outcome: Progressing Towards Goal   Problem: Self Care Deficits Care Plan (Adult)  Goal: *Acute Goals and Plan of Care (Insert Text)  Description  Occupational Therapy Goals  Initiated 7/16/2019; reviewed and continued for 7 days 7-23  1. Patient will perform grooming standing at sink for 5 min without fatigue or LOB with supervision/set-up within 7 day(s). 2.  Patient will perform lower body dressing with supervision/set-up using AE PRN within 7 day(s). 3.  Patient will perform bathing with supervision/set-up within 7 day(s). 4.  Patient will perform toilet transfers with supervision/set-up within 7 day(s). 5.  Patient will perform all aspects of toileting with supervision/set-up using AE PRN within 7 day(s). 6.  Patient will participate in upper extremity therapeutic exercise/activities with supervision/set-up for 10 minutes within 7 day(s). Met inconsistently- continue for consistency  7. Patient will utilize energy conservation, fall prevention and pain management techniques during functional activities with verbal cues within 7 day(s). Outcome: Progressing Towards Goal   OCCUPATIONAL THERAPY TREATMENT: WEEKLY REASSESSMENT  Patient: Corinna Calabrese (38 y.o. male)  Date: 7/23/2019  Diagnosis: Sepsis ([facility] [A41.9] <principal problem not specified>       Precautions: Fall, Skin  Chart, occupational therapy assessment, plan of care, and goals were reviewed. ASSESSMENT:  OT goals reviewed with patient, progress made towards self care goals but not yet S-CGA consistently due to levels of pain B feet; Training in B UE AROM HEP with moderate resistance red theraband and goal of 30 cumulative minutes daily as lifetime habit. Good participation this session with set up-mod A UE ADLs and mod/max A LE ADLs limited by B foot pain which is improving.  MIN A functional mobility overall and feels ready to try [facility] for next BM (previously using bed pan.) Patient will benefit from inpatient rehab and plans to return to full time work as . Progression toward goals:  ?            Improving appropriately and progressing toward goals  ? Improving slowly and progressing toward goals  ? Not making progress toward goals and plan of care will be adjusted     PLAN:  Goals have been updated based on progression since last assessment. Patient continues to benefit from skilled intervention to address the above impairments. Continue to follow patient 5 times a week to address goals. Planned Interventions:  ?                    Self Care Training                  ? Therapeutic Activities  ? Functional Mobility Training    ? Cognitive Retraining  ? Therapeutic Exercises           ? Endurance Activities  ? Balance Training                   ? Neuromuscular Re-Education  ? Visual/Perceptual Training     ? Home Safety Training  ? Patient Education                 ? Family Training/Education  ? Other (comment):  Discharge Recommendations: Inpatient Rehab  Further Equipment Recommendations for Discharge: transfer bench     SUBJECTIVE:   Patient stated I want to get to the commode next time I need to go.     OBJECTIVE DATA SUMMARY:   Cognitive/Behavioral Status:  Neurologic State: Alert; Appropriate for age  Orientation Level: Oriented X4  Cognition: Follows commands; Appropriate decision making; Appropriate for age attention/concentration; Appropriate safety awareness  Perception: Appears intact  Perseveration: No perseveration noted  Safety/Judgement: Fall prevention; Insight into deficits; Awareness of environment    Functional Mobility and Transfers for ADLs:  Bed Mobility:  Rolling: Contact guard assistance  Supine to Sit: Contact guard assistance  Sit to Supine: Moderate assistance  Scooting: Setup;Stand-by assistance    Transfers:  Sit to Stand: Contact guard assistance  Functional Transfers  Bathroom Mobility: (has not yet done toilet transfer due to B foot pain)  Toilet Transfer : (recommend [address] at bedside for futute BM use; patient agrees)   Bed to Chair: (says he did not feel safe yet for first time step to chair)    Balance:  Sitting: Intact  Sitting - Static: Good (unsupported)  Sitting - Dynamic: Fair (occasional)  Standing: Impaired  Standing - Static: Fair  Standing - Dynamic : Fair    ADL Intervention:  Feeding  Feeding Assistance: Independent    Grooming  Grooming Assistance: Set-up    Upper Body Bathing  Bathing Assistance: Minimum assistance    Lower Body Bathing  Bathing Assistance: Maximum assistance(inferred based on AROM and sitting balance with tasks)    Upper Body Dressing Assistance  Dressing Assistance: Set-up    Lower Body Dressing Assistance  Dressing Assistance: Moderate assistance;Maximum assistance    Toileting  Toileting Assistance: Total assistance(dependent)(recommend HDBSC use to be initiated at bedside)    Cognitive Retraining  Attention to Task: Single task;Multi-task  Maintains Attention For (Time): Greater than 10 minutes  Safety/Judgement: Fall prevention; Insight into deficits; Awareness of environment      Pain:  Pain Scale 1: Numeric (0 - 10)  Pain Intensity 1: 4  Pain Location 1: Leg  Pain Orientation 1: Right;Left  Pain Description 1: Aching  Pain Intervention(s) 1: Medication (see MAR)  Activity Tolerance:   improving  Please refer to the flowsheet for vital signs taken during this treatment. After treatment:   ? Patient left in no apparent distress sitting up in chair  ? Patient left in no apparent distress in bed  ? Call bell left within reach  ? Nursing notified  ? Caregiver present  ?  Bed alarm activated    COMMUNICATION/COLLABORATION:   The patients plan of care was discussed with: Registered Nurse and     Carmen Prado OTR/L  Time Calculation: 22 mins

## 2019-07-23 NOTE — WOUND CARE
WOCN Note:     Follow-up visit for lower leg cellulitis wounds and abdominal wound ? Heating pad  Injury prior to admission and left lateral thigh wound ? From slide out of bed. Assessment:   Patient is A&O x 3, communicative, continent and not mobile feeling weak.  3 assist to turn, reposition and lift up in the bed. Patient not wearing briefs and bariatric commode in room. Bed: TCBPlus  Patient reports no pain just frustrated with feeling weak. Bilateral heels, buttocks and sacral skin intact and without erythema. Heels offloaded on pillows. Left lateral thigh: 7.0cm x 7.0cm x 0.0cm / pink healing area with scab. Patient not sure when or where it came from. Left anterior lower le% red and 30% scattered slough: cleaned with NS. Surrounding skin with warm red cellulitis and tender. Right upper abdominal wall: 60% pink with tan tissue 40%, adherent. Surrounding tissue remains in tact. Recommendations:    - every 3 days: left anterior lower leg: right upper abdominal wall: clean with NS, apply medihoney alginate to both wounds and mepilex border dressing.  -daily: left lateral thigh: apply very small amount of carrasyn Gel and 1 sheet mepilex transfer. Minimize layers of linen/pads under patient to optimize support surface. Turn/reposition approximately every 2 hours and offload heels. Manage incontinence / promote continence; Aloe Vesta to buttocks and sacrum daily and as needed with incontinence care. Specialty bed: TCB Plus  Discussed above plan with patient and Fanta Vargas.     Transition of Care: Plan to follow weekly and as needed while admitted to hospital.     Samantha WERNER RN  Wound Care Department  Office: [phone]  Pager: [phone]

## 2019-07-23 NOTE — PROGRESS NOTES
Bedside and Verbal shift change report given to Fatou Haney RN  (oncoming nurse) by Cleophus Sandhoff (offgoing nurse). Report included the following information SBAR and Kardex.

## 2019-07-23 NOTE — PROGRESS NOTES
Problem: Mobility Impaired (Adult and Pediatric)  Goal: *Acute Goals and Plan of Care (Insert Text)  Description  Physical Therapy Goals  Initiated 7/16/2019  1. Patient will move from supine to sit and sit to supine  in bed with modified independence within 7 day(s). 2.  Patient will perform sit to stand with modified independence within 7 day(s). 3.  Patient will ambulate with modified independence for 75 feet with the least restrictive device within 7 day(s). 4.  Patient will ascend/descend 1 step with walker with modified independence within 7 day(s). Outcome: Progressing Towards Goal    PHYSICAL THERAPY TREATMENT: WEEKLY REASSESSMENT  Patient: Khari Esquivel (48 y.o. male)  Date: 7/23/2019  Diagnosis: Sepsis ([address] [A41.9] <principal problem not specified>       Precautions: Fall, Skin  Chart, physical therapy assessment, plan of care and goals were reviewed. ASSESSMENT:  Pt demos increased performance today, stating that he has done his homework of hooklying and bridging, AP and heel slides. Pt eager to participate as he feels like he is starting to make progress. Pt performed supine to sit without assistance CGA for safety, improved WB on feet and cues for rocking and patient able to stand with min A-CGA x3. Pt tolerating standing well with decreased assist for balance and able to take sidesteps to hob for readjustment in the bed. Pt progressing well and would require the inpatient rehab setting due to his young age, good progress, increased weight bearing and will improve and tolerate 3h of therapy. Pt eager to have a short intense stay and return to [facility]  Patient's progression toward goals since last assessment: Pt demos improvement in participation, weight bearing, and muscle recruitment     PLAN:  Goals have been updated based on progression since last assessment. Patient continues to benefit from skilled intervention to address the above impairments.   Continue to follow the patient 5 times a week to address goals. Planned Interventions:  ?              Bed Mobility Training             ? Neuromuscular Re-Education  ? Transfer Training                   ? Orthotic/Prosthetic Training  ? Gait Training                         ? Modalities  ? Therapeutic Exercises           ? Edema Management/Control  ? Therapeutic Activities            ? Patient and Family Training/Education  ? Other (comment):  Discharge Recommendations: Inpatient Rehab  Further Equipment Recommendations for Discharge: TBD. Likely bariatric RW      SUBJECTIVE:   Patient stated im ready! Michael Carter    OBJECTIVE DATA SUMMARY:   Critical Behavior:  Neurologic State: Alert  Orientation Level: Oriented X4  Cognition: Follows commands  Safety/Judgement: Awareness of environment, Insight into deficits    Strength:   Strength: Generally decreased, functional                      Functional Mobility Training:  Bed Mobility:  Rolling: Contact guard assistance  Supine to Sit: Contact guard assistance  Sit to Supine:  Moderate assistance  Scooting: Setup;Stand-by assistance        Transfers:  Sit to Stand: Contact guard assistance  Stand to Sit: Contact guard assistance                             Balance:  Sitting: Intact  Sitting - Static: Good (unsupported)  Sitting - Dynamic: Fair (occasional)  Standing: Impaired  Standing - Static: Fair  Standing - Dynamic : Fair  Ambulation/Gait Training:  Distance (ft): 4 Feet (ft)  Assistive Device: Walker, rolling;Gait belt  Ambulation - Level of Assistance: Contact guard assistance     Gait Description (WDL): Exceptions to WDL  Gait Abnormalities: Antalgic;Decreased step clearance;Shuffling gait        Base of Support: Widened     Speed/Yuliana: Slow;Shuffled  Step Length: Left shortened;Right shortened                    Stairs:      Pain:  Pain Scale 1: Numeric (0 - 10)  Pain Intensity 1: 4  Pain Location 1: Leg  Pain Orientation 1: Right;Left  Pain Description 1: Aching  Pain Intervention(s) 1: Medication (see MAR)  Activity Tolerance:   Good to fair for short periods  Please refer to the flowsheet for vital signs taken during this treatment. After treatment:   ?  Patient left in no apparent distress sitting up in chair  ? Patient left in no apparent distress in bed  ? Call bell left within reach  ? Nursing notified  ? Caregiver present  ?   Bed alarm activated    COMMUNICATION/COLLABORATION:   The patients plan of care was discussed with: Registered Nurse and     Dalila Wright, PT   Time Calculation: 25 mins

## 2019-07-23 NOTE — PROGRESS NOTES
[facility]   [address] [facility], [address] [place] [address] [address], [facility]  Phone: [phone]   [phone]       Nephrology Progress Note  Lesvia Shepard     1955     [id number]  Date of Admission : 7/12/2019 07/23/19    CC:  Follow up for CYNDI       Assessment and Plan   CYNDI   - resolved and stable   - continue losartan and lasix on d/c   - No out pt f/u needed    CKD - ?? baseline     Chronic venous insufficiency/dermatitis/wounds  -per Dr Katherin Card      HTN, relative hypotension     DM     Obesity, JOSUÉ     Interval History:    Edema continues to improve   No labs today   No N/V/CP/SOB    Review of Systems: Pertinent items are noted in HPI.     Current Medications:   Current Facility-Administered Medications   Medication Dose Route Frequency    amoxicillin-clavulanate (AUGMENTIN) 875-125 mg per tablet 1 Tab  1 Tab Oral Q12H    furosemide (LASIX) tablet 40 mg  40 mg Oral DAILY    insulin lispro (HUMALOG) injection   SubCUTAneous AC&HS    potassium chloride SR (KLOR-CON 10) tablet 20 mEq  20 mEq Oral DAILY    amLODIPine (NORVASC) tablet 5 mg  5 mg Oral DAILY    lisinopril (PRINIVIL, ZESTRIL) tablet 10 mg  10 mg Oral DAILY    sodium chloride (NS) flush 5-10 mL  5-10 mL IntraVENous PRN    acetaminophen (TYLENOL) tablet 650 mg  650 mg Oral Q4H PRN    sodium chloride (NS) flush 5-40 mL  5-40 mL IntraVENous Q8H    sodium chloride (NS) flush 5-40 mL  5-40 mL IntraVENous PRN    glucose chewable tablet 16 g  4 Tab Oral PRN    dextrose (D50W) injection syrg 12.5-25 g  25-50 mL IntraVENous PRN    glucagon (GLUCAGEN) injection 1 mg  1 mg IntraMUSCular PRN    heparin (porcine) injection 5,000 Units  5,000 Units SubCUTAneous Q8H    albuterol-ipratropium (DUO-NEB) 2.5 MG-0.5 MG/3 ML  3 mL Nebulization Q4H PRN      Allergies   Allergen Reactions    Phenobarbital Unknown (comments)       Objective:  Vitals:    Vitals:    07/22/19 0922 07/22/19 1520 07/23/19 0044 07/23/19 0801   BP:  135/71 111/60 113/72   Pulse:  72 81 82   Resp:  20 20 20   Temp:  97.3 °F (36.3 °C) 97.6 °F (36.4 °C) 98 °F (36.7 °C)   SpO2:  94% 93% 93%   Weight: (!) 186.3 kg (410 lb 11.5 oz)      Height:         Intake and Output:  No intake/output data recorded. 07/21 1901 - 07/23 0700  In: 240 [P.O.:240]  Out: 2650 [Urine:2650]    Physical Examination:    General: Obese   Neck:  Supple, no mass  Resp:  CTA  CV:  RRR,  no murmur or rub,chronic LE edema  GI:  Soft, NT, + Bowel sounds, no hepatosplenomegaly  Neurologic:  Non focal  Psych:             AAO x 3 appropriate affect   Skin:  Involving both LE      []    High complexity decision making was performed  []    Patient is at high-risk of decompensation with multiple organ involvement    Lab Data Personally Reviewed: I have reviewed all the pertinent labs, microbiology data and radiology studies during assessment.     Recent Labs     07/22/19  0358      K 4.1      CO2 24   *   BUN 12   CREA 1.24   CA 8.5     Recent Labs     07/22/19  0358   WBC 9.8   HGB 9.4*   HCT 30.3*   *     No results found for: SDES  Lab Results   Component Value Date/Time    Culture result: NO GROWTH 2 DAYS 07/13/2019 07:16 AM    Culture result: NO GROWTH 5 DAYS 07/13/2019 01:08 AM    Culture result: NO GROWTH 5 DAYS 07/13/2019 01:08 AM     Recent Results (from the past 24 hour(s))   GLUCOSE, POC    Collection Time: 07/22/19 11:01 AM   Result Value Ref Range    Glucose (POC) 141 (H) 65 - 100 mg/dL    Performed by [address], POC    Collection Time: 07/22/19  4:46 PM   Result Value Ref Range    Glucose (POC) 128 (H) 65 - 100 mg/dL    Performed by [address], POC    Collection Time: 07/23/19 12:36 AM   Result Value Ref Range    Glucose (POC) 125 (H) 65 - 100 mg/dL    Performed by Toro Gage    GLUCOSE, POC    Collection Time: 07/23/19  6:43 AM   Result Value Ref Range    Glucose (POC) 115 (H) 65 - 100 mg/dL    Performed by Raghavendra Evans Total time spent with patient:  xxx   min. Care Plan discussed with:  Patient     Family      RN      Consulting Physician [address] [facility],         I have reviewed the flowsheets. Chart and Pertinent Notes have been reviewed. No change in PMH ,family and social history from Consult note.       Jovanna Angeles MD

## 2019-07-23 NOTE — PROGRESS NOTES
Hospitalist Progress Note  Maty Jacinto NP  Answering service: [phone] from in house phone  Cell: [phone]   Date of Service:  2019  NAME:  Tosin Bey  :  1955  MRN:  [id number]    Admission Summary:   Pt presented from [place] with complaints of redness, swelling, and pain of both the legs, L>R. Pmhx arthritis, asthma, type 2 diabetes mellitus, hypertension, morbid obesity, sleep apnea    Interval history / Subjective:   Pt lying in bed, no new complaints - awaiting Rehab approval     Assessment & Plan:     Sepsis 2/2 left lower extremity cellulitis: improved  - hx chronic venous stasis changes with edema  - lactic acidosis on POA: resolved  - leucocytosis: resolved  - blood cx: no growth (final);  wound cx was not sent on admission  - discontinued vanco  and changed IV zosyn to po augmentin    - wound care  - appreciate ID input     Acute metabolic encephalopathy: resolved     CYNDI: improved/resolved   - nephrology on board  - cr unknown baseline  - nephro ok with lasix and lisinopril      SOB on exertion: multifactorial  - improving   - obesity, did not participate with PT in last 3 days  - CXR showed: Stable to slightly increased diffuse interstitial prominence. Correlate for developing interstitial edema     Hx DM:   - A1c 6.7, Blood glucose 115-138  - Metformin on hold. Continue with SSI    Hx HTN: c/w amlodipine, lisinopril, lasix     Hx JOSUÉ: has CPAP at bedside     Morbid Obesity: Body mass index is 62.45 kg/m²  - weight loss recommended     Code status: Full  DVT prophylaxis: Heparin  Care Plan discussed with: patient  Disposition: Inpatient Rehab when insurance authorization obtained     Hospital Problems  Never Reviewed          Codes Class Noted POA    Sepsis ([facility] [address] ICD-10-CM: A41.9  ICD-9-CM: 038.9, 995.91  2019 Unknown            Review of Systems:   Denies HA.  No chest pain or pressure. No shortness of breath. No GI complaints, no N/V. Vital Signs:    Last 24hrs VS reviewed since prior progress note. Most recent are:  Visit Vitals  /83   Pulse 72   Temp 98.1 °F (36.7 °C)   Resp 20   Ht 5' 8\" (1.727 m)   Wt (!) 186.3 kg (410 lb 11.5 oz)   SpO2 96%   BMI 62.45 kg/m²       Intake/Output Summary (Last 24 hours) at 7/23/2019 1753  Last data filed at 7/23/2019 1555  Gross per 24 hour   Intake    Output 1450 ml   Net -1450 ml     Physical Examination:        Constitutional:  No acute distress, cooperative, pleasant    ENT:  Oral MM, oropharynx benign    Resp:  CTA bilaterally. No accessory muscle use and on RA   CV:  Regular rhythm, normal rate, no murmurs    GI:  Soft, non distended, non tender. Normoactive bowel sounds + BM    Musculoskeletal:  No edema, warm, 2+ pulses throughout    Neurologic:  Moves all extremities. AAOx3, CN II-XII reviewed      Data Review:   Review and/or order of clinical lab test  Review and/or order of tests in the medicine section of CPT    Labs:     Recent Labs     07/22/19  0358   WBC 9.8   HGB 9.4*   HCT 30.3*   *     Recent Labs     07/22/19  0358      K 4.1      CO2 24   BUN 12   CREA 1.24   *   CA 8.5     No results for input(s): SGOT, GPT, ALT, AP, TBIL, TBILI, TP, ALB, GLOB, GGT, AML, LPSE in the last 72 hours. No lab exists for component: AMYP, HLPSE  No results for input(s): INR, PTP, APTT in the last 72 hours. No lab exists for component: INREXT   No results for input(s): FE, TIBC, PSAT, FERR in the last 72 hours. No results found for: FOL, RBCF   No results for input(s): PH, PCO2, PO2 in the last 72 hours. No results for input(s): CPK, CKNDX, TROIQ in the last 72 hours.     No lab exists for component: CPKMB  No results found for: CHOL, CHOLX, CHLST, CHOLV, HDL, LDL, LDLC, DLDLP, TGLX, TRIGL, TRIGP, CHHD, CHHDX  Lab Results   Component Value Date/Time    Glucose (POC) 131 (H) 07/23/2019 04:29 PM    Glucose (POC) 138 (H) 07/23/2019 11:24 AM    Glucose (POC) 115 (H) 07/23/2019 06:43 AM    Glucose (POC) 125 (H) 07/23/2019 12:36 AM    Glucose (POC) 128 (H) 07/22/2019 04:46 PM     Lab Results   Component Value Date/Time    Color YELLOW/STRAW 07/13/2019 07:16 AM    Appearance CLOUDY (A) 07/13/2019 07:16 AM    Specific gravity 1.022 07/13/2019 07:16 AM    pH (UA) 5.5 07/13/2019 07:16 AM    Protein 30 (A) 07/13/2019 07:16 AM    Glucose NEGATIVE  07/13/2019 07:16 AM    Ketone TRACE (A) 07/13/2019 07:16 AM    Bilirubin NEGATIVE  07/13/2019 07:16 AM    Urobilinogen 0.2 07/13/2019 07:16 AM    Nitrites NEGATIVE  07/13/2019 07:16 AM    Leukocyte Esterase NEGATIVE  07/13/2019 07:16 AM    Epithelial cells FEW 07/13/2019 07:16 AM    Bacteria 2+ (A) 07/13/2019 07:16 AM    WBC 5-10 07/13/2019 07:16 AM    RBC 0-5 07/13/2019 07:16 AM     Medications Reviewed:     Current Facility-Administered Medications   Medication Dose Route Frequency    amoxicillin-clavulanate (AUGMENTIN) 875-125 mg per tablet 1 Tab  1 Tab Oral Q12H    furosemide (LASIX) tablet 40 mg  40 mg Oral DAILY    insulin lispro (HUMALOG) injection   SubCUTAneous AC&HS    potassium chloride SR (KLOR-CON 10) tablet 20 mEq  20 mEq Oral DAILY    amLODIPine (NORVASC) tablet 5 mg  5 mg Oral DAILY    lisinopril (PRINIVIL, ZESTRIL) tablet 10 mg  10 mg Oral DAILY    sodium chloride (NS) flush 5-10 mL  5-10 mL IntraVENous PRN    acetaminophen (TYLENOL) tablet 650 mg  650 mg Oral Q4H PRN    sodium chloride (NS) flush 5-40 mL  5-40 mL IntraVENous Q8H    sodium chloride (NS) flush 5-40 mL  5-40 mL IntraVENous PRN    glucose chewable tablet 16 g  4 Tab Oral PRN    dextrose (D50W) injection syrg 12.5-25 g  25-50 mL IntraVENous PRN    glucagon (GLUCAGEN) injection 1 mg  1 mg IntraMUSCular PRN    heparin (porcine) injection 5,000 Units  5,000 Units SubCUTAneous Q8H    albuterol-ipratropium (DUO-NEB) 2.5 MG-0.5 MG/3 ML  3 mL Nebulization Q4H PRN ______________________________________________________________________  EXPECTED LENGTH OF STAY: 4d 19h  ACTUAL LENGTH OF STAY:          [address], NP

## 2019-07-24 ENCOUNTER — APPOINTMENT (OUTPATIENT)
Dept: ULTRASOUND IMAGING | Age: 64
DRG: 871 | End: 2019-07-24
Attending: NURSE PRACTITIONER
Payer: COMMERCIAL

## 2019-07-24 LAB
ALBUMIN SERPL-MCNC: 2.3 G/DL (ref 3.5–5)
ANION GAP SERPL CALC-SCNC: 6 MMOL/L (ref 5–15)
BUN SERPL-MCNC: 14 MG/DL (ref 6–20)
BUN/CREAT SERPL: 11 (ref 12–20)
CALCIUM SERPL-MCNC: 9.2 MG/DL (ref 8.5–10.1)
CHLORIDE SERPL-SCNC: 106 MMOL/L (ref 97–108)
CO2 SERPL-SCNC: 24 MMOL/L (ref 21–32)
CREAT SERPL-MCNC: 1.24 MG/DL (ref 0.7–1.3)
GLUCOSE BLD STRIP.AUTO-MCNC: 121 MG/DL (ref 65–100)
GLUCOSE BLD STRIP.AUTO-MCNC: 142 MG/DL (ref 65–100)
GLUCOSE BLD STRIP.AUTO-MCNC: 151 MG/DL (ref 65–100)
GLUCOSE BLD STRIP.AUTO-MCNC: 169 MG/DL (ref 65–100)
GLUCOSE SERPL-MCNC: 137 MG/DL (ref 65–100)
PHOSPHATE SERPL-MCNC: 3.4 MG/DL (ref 2.6–4.7)
POTASSIUM SERPL-SCNC: 4.4 MMOL/L (ref 3.5–5.1)
SERVICE CMNT-IMP: ABNORMAL
SODIUM SERPL-SCNC: 136 MMOL/L (ref 136–145)

## 2019-07-24 PROCEDURE — 36415 COLL VENOUS BLD VENIPUNCTURE: CPT

## 2019-07-24 PROCEDURE — 74011250636 HC RX REV CODE- 250/636: Performed by: INTERNAL MEDICINE

## 2019-07-24 PROCEDURE — 74011250637 HC RX REV CODE- 250/637: Performed by: INTERNAL MEDICINE

## 2019-07-24 PROCEDURE — 74011636637 HC RX REV CODE- 636/637: Performed by: INTERNAL MEDICINE

## 2019-07-24 PROCEDURE — 74011250636 HC RX REV CODE- 250/636: Performed by: FAMILY MEDICINE

## 2019-07-24 PROCEDURE — 74011000258 HC RX REV CODE- 258: Performed by: INTERNAL MEDICINE

## 2019-07-24 PROCEDURE — 97116 GAIT TRAINING THERAPY: CPT | Performed by: PHYSICAL THERAPIST

## 2019-07-24 PROCEDURE — 80069 RENAL FUNCTION PANEL: CPT

## 2019-07-24 PROCEDURE — 76882 US LMTD JT/FCL EVL NVASC XTR: CPT

## 2019-07-24 PROCEDURE — 82962 GLUCOSE BLOOD TEST: CPT

## 2019-07-24 PROCEDURE — 65270000032 HC RM SEMIPRIVATE

## 2019-07-24 RX ADMIN — FUROSEMIDE 40 MG: 40 TABLET ORAL at 08:47

## 2019-07-24 RX ADMIN — PIPERACILLIN SODIUM,TAZOBACTAM SODIUM 3.38 G: 3; .375 INJECTION, POWDER, FOR SOLUTION INTRAVENOUS at 12:04

## 2019-07-24 RX ADMIN — ACETAMINOPHEN 650 MG: 325 TABLET ORAL at 22:59

## 2019-07-24 RX ADMIN — INSULIN LISPRO 3 UNITS: 100 INJECTION, SOLUTION INTRAVENOUS; SUBCUTANEOUS at 17:41

## 2019-07-24 RX ADMIN — AMLODIPINE BESYLATE 5 MG: 5 TABLET ORAL at 08:46

## 2019-07-24 RX ADMIN — PIPERACILLIN SODIUM,TAZOBACTAM SODIUM 3.38 G: 3; .375 INJECTION, POWDER, FOR SOLUTION INTRAVENOUS at 04:25

## 2019-07-24 RX ADMIN — LISINOPRIL 10 MG: 10 TABLET ORAL at 08:47

## 2019-07-24 RX ADMIN — HEPARIN SODIUM 5000 UNITS: 5000 INJECTION INTRAVENOUS; SUBCUTANEOUS at 12:04

## 2019-07-24 RX ADMIN — PIPERACILLIN SODIUM,TAZOBACTAM SODIUM 3.38 G: 3; .375 INJECTION, POWDER, FOR SOLUTION INTRAVENOUS at 19:26

## 2019-07-24 RX ADMIN — HEPARIN SODIUM 5000 UNITS: 5000 INJECTION INTRAVENOUS; SUBCUTANEOUS at 19:26

## 2019-07-24 RX ADMIN — POTASSIUM CHLORIDE 20 MEQ: 750 TABLET, EXTENDED RELEASE ORAL at 08:47

## 2019-07-24 RX ADMIN — HEPARIN SODIUM 5000 UNITS: 5000 INJECTION INTRAVENOUS; SUBCUTANEOUS at 04:24

## 2019-07-24 NOTE — PROGRESS NOTES
Bedside shift change report given to Adiel Thompson RN (oncoming nurse) by Kaylin Bell RN (offgoing nurse). Report included the following information SBAR, Kardex, Procedure Summary, Intake/Output, MAR and Recent Results.

## 2019-07-24 NOTE — PROGRESS NOTES
[facility]   [address] [facility], [address] [place] [address], [facility]  Phone: [phone]   [phone]       Nephrology Progress Note  Estiven May     1955     [id number]  Date of Admission : 7/12/2019 07/24/19    CC:  Follow up for CYNDI       Assessment and Plan   CYNDI   - resolved. No labs in 2 days -- ordered now  - continue losartan and lasix   - watch renal function w/ IV zosyn     CKD - ?? baseline     Chronic venous insufficiency/dermatitis/wounds  -per Dr Vitor Molina      HTN, relative hypotension     DM     Obesity, JOSUÉ     Interval History:    Seen and examined . Noted plans to continue IV abx   He has no new Sx  No N/V/CP/SOB    Review of Systems: Pertinent items are noted in HPI.     Current Medications:   Current Facility-Administered Medications   Medication Dose Route Frequency    piperacillin-tazobactam (ZOSYN) 3.375 g in 0.9% sodium chloride (MBP/ADV) 100 mL  3.375 g IntraVENous Q8H    furosemide (LASIX) tablet 40 mg  40 mg Oral DAILY    insulin lispro (HUMALOG) injection   SubCUTAneous AC&HS    potassium chloride SR (KLOR-CON 10) tablet 20 mEq  20 mEq Oral DAILY    amLODIPine (NORVASC) tablet 5 mg  5 mg Oral DAILY    lisinopril (PRINIVIL, ZESTRIL) tablet 10 mg  10 mg Oral DAILY    sodium chloride (NS) flush 5-10 mL  5-10 mL IntraVENous PRN    acetaminophen (TYLENOL) tablet 650 mg  650 mg Oral Q4H PRN    sodium chloride (NS) flush 5-40 mL  5-40 mL IntraVENous Q8H    sodium chloride (NS) flush 5-40 mL  5-40 mL IntraVENous PRN    glucose chewable tablet 16 g  4 Tab Oral PRN    dextrose (D50W) injection syrg 12.5-25 g  25-50 mL IntraVENous PRN    glucagon (GLUCAGEN) injection 1 mg  1 mg IntraMUSCular PRN    heparin (porcine) injection 5,000 Units  5,000 Units SubCUTAneous Q8H    albuterol-ipratropium (DUO-NEB) 2.5 MG-0.5 MG/3 ML  3 mL Nebulization Q4H PRN      Allergies   Allergen Reactions    Phenobarbital Unknown (comments)       Objective:  Vitals:    Vitals: 07/23/19 1457 07/24/19 0017 07/24/19 0733 07/24/19 0734   BP: 145/83 141/68 137/77    Pulse: 72 75 78    Resp: 20 22 20    Temp: 98.1 °F (36.7 °C) 98.3 °F (36.8 °C) 97.7 °F (36.5 °C)    SpO2: 96% 94% 93%    Weight:    (!) 182.9 kg (403 lb 3.5 oz)   Height:         Intake and Output:  No intake/output data recorded. 07/22 1901 - 07/24 0700  In: -   Out: 2050 [Urine:2050]    Physical Examination:    General: Obese   Neck:  Supple, no mass  Resp:  CTA  CV:  RRR,  no murmur or rub,chronic LE edema  GI:  Soft, NT, + Bowel sounds, no hepatosplenomegaly  Neurologic:  Non focal  Psych:             AAO x 3 appropriate affect   Skin:  Involving both LE      []    High complexity decision making was performed  []    Patient is at high-risk of decompensation with multiple organ involvement    Lab Data Personally Reviewed: I have reviewed all the pertinent labs, microbiology data and radiology studies during assessment. Recent Labs     07/22/19  0358      K 4.1      CO2 24   *   BUN 12   CREA 1.24   CA 8.5     Recent Labs     07/22/19  0358   WBC 9.8   HGB 9.4*   HCT 30.3*   *     No results found for: SDES  Lab Results   Component Value Date/Time    Culture result: NO GROWTH 2 DAYS 07/13/2019 07:16 AM    Culture result: NO GROWTH 5 DAYS 07/13/2019 01:08 AM    Culture result: NO GROWTH 5 DAYS 07/13/2019 01:08 AM     Recent Results (from the past 24 hour(s))   GLUCOSE, POC    Collection Time: 07/23/19 11:24 AM   Result Value Ref Range    Glucose (POC) 138 (H) 65 - 100 mg/dL    Performed by [address], POC    Collection Time: 07/23/19  4:29 PM   Result Value Ref Range    Glucose (POC) 131 (H) 65 - 100 mg/dL    Performed by [address], POC    Collection Time: 07/23/19  9:10 PM   Result Value Ref Range    Glucose (POC) 110 (H) 65 - 100 mg/dL    Performed by Maira Cortez            Total time spent with patient:  xxx   min.                                Care Plan discussed with:  Patient     Family      RN      Consulting Physician [address]        I have reviewed the flowsheets. Chart and Pertinent Notes have been reviewed. No change in PMH ,family and social history from Consult note.       Monica Retana MD

## 2019-07-24 NOTE — PROGRESS NOTES
Occupational Therapy    Attempted OT session. Patient received resting with CPAP on. Able to obtain a bariatric BSC and encouraged for self care transfer training. He reports an eventful afternoon including PT, CT, difficulty with urinal use then needing bed changed. He is happy with his progression with PT. Politely declining OT this afternoon. He reports he has been completing his BUE theraband exercises. Left bariatric BSC in room for future OT sessions to progress to transfers as tolerated and safe. Recommend inpatient rehab for additional OT services.      Thank you,    Jo Milian, OT

## 2019-07-24 NOTE — ROUTINE PROCESS
Bedside and Verbal shift change report given to Brenda Hernandez RN (oncoming nurse) by Anne-Marie Hood RN (offgoing nurse). Report included the following information SBAR, Kardex, Intake/Output, MAR and Recent Results.

## 2019-07-24 NOTE — PROGRESS NOTES
Hospitalist Progress Note  Yajaira Aquino NP  Answering service: [phone] from in house phone  Cell: [phone]   Date of Service:  2019  NAME:  David Rascon  :  1955  MRN:  [id number]    Admission Summary:   Pt presented from [place] with complaints of redness, swelling, and pain of both the legs, L>R. Pmhx arthritis, asthma, type 2 diabetes mellitus, hypertension, morbid obesity, sleep apnea    Interval history / Subjective:   Pt lying in bed, says he feels some discomfort in his R hip and upper leg at end of day, mostly at night time. Pt is waiting on Rehab approval and apparently this process started today. Assessment & Plan:     Sepsis 2/2 left lower extremity cellulitis: improved  - hx chronic venous stasis changes with edema  - lactic acidosis on POA: resolved  - leucocytosis: resolved  - blood cx: no growth (final);  wound cx was not sent on admission  - discontinued vanc  and changed IV zosyn to po augmentin , however, Dr. Sanders Ask changed back to Zosyn last pm as he feels as though his LLE is not looking much better. - wound care to continue  - discussed care with ID. Left lower leg wound appears to look worse - the lower wound. Possible area of fluid collection which is more tender than rest of wound area. Some serous drainage and no pus with palpation. Will get ultrasound of the LLE to eval for possible collection that then may be surgically I&D'd     Acute metabolic encephalopathy: resolved     CYNDI: improved/resolved   - nephrology on board  - cr unknown baseline  - nephro ok with lasix and lisinopril   - monitor this as pt is back on zosyn     SOB on exertion: multifactorial  - improving   - CXR: Stable to slightly increased diffuse interstitial prominence. Correlate for developing interstitial edema     Hx DM:   - A1c 6.7, Blood glucose 110-169  - Metformin on hold.  Continue with SSI    Hx HTN: c/w amlodipine, lisinopril, lasix     Hx JOSUÉ: has CPAP at bedside     Morbid Obesity: Body mass index is 62.45 kg/m²  - weight loss recommended     Code status: Full  DVT prophylaxis: Heparin  Care Plan discussed with: patient, nurse,  and ID  Disposition: Inpatient Rehab when insurance authorization obtained and pt stable for d/c     Hospital Problems  Never Reviewed          Codes Class Noted POA    Sepsis ([facility] [address] ICD-10-CM: A41.9  ICD-9-CM: 038.9, 995.91  7/13/2019 Unknown            Review of Systems:   Denies HA. No chest pain or pressure. No shortness of breath. No GI complaints, no N/V. Vital Signs:    Last 24hrs VS reviewed since prior progress note. Most recent are:  Visit Vitals  /77 (BP 1 Location: Right arm, BP Patient Position: At rest)   Pulse 78   Temp 97.7 °F (36.5 °C)   Resp 20   Ht 5' 8\" (1.727 m)   Wt (!) 182.9 kg (403 lb 3.5 oz)   SpO2 93%   BMI 61.31 kg/m²       Intake/Output Summary (Last 24 hours) at 7/24/2019 1309  Last data filed at 7/24/2019 1154  Gross per 24 hour   Intake    Output 1800 ml   Net -1800 ml     Physical Examination:        Constitutional:  No acute distress, cooperative, pleasant    ENT:  Oral MM, oropharynx benign    Resp:  No accessory muscle use and on RA    GI:  Obese, + BM    Musculoskeletal:  No edema, warm, 2+ pulses throughout    Neurologic:  Moves all extremities. AAOx3   Skin: Left hip with dressing over light colored scab.  Left leg dressing with small open/partially scabbed area and then distal to that a raised tender area with some clear fluid drainage      Data Review:   Review and/or order of clinical lab test  Review and/or order of tests in the radiology section of CPT  Review and/or order of tests in the medicine section of CPT    Labs:     Recent Labs     07/22/19  0358   WBC 9.8   HGB 9.4*   HCT 30.3*   *     Recent Labs     07/24/19  1210 07/22/19  0358    139   K 4.4 4.1    108   CO2 24 24   BUN 14 12   CREA 1. 24 1.24   * 107*   CA 9.2 8.5   PHOS 3.4  --      Recent Labs     07/24/19  1210   ALB 2.3*     No results for input(s): INR, PTP, APTT in the last 72 hours. No lab exists for component: INREXT, INREXT   No results for input(s): FE, TIBC, PSAT, FERR in the last 72 hours. No results found for: FOL, RBCF   No results for input(s): PH, PCO2, PO2 in the last 72 hours. No results for input(s): CPK, CKNDX, TROIQ in the last 72 hours.     No lab exists for component: CPKMB  No results found for: CHOL, CHOLX, CHLST, CHOLV, HDL, LDL, LDLC, DLDLP, TGLX, TRIGL, TRIGP, CHHD, CHHDX  Lab Results   Component Value Date/Time    Glucose (POC) 121 (H) 07/24/2019 11:28 AM    Glucose (POC) 169 (H) 07/24/2019 08:51 AM    Glucose (POC) 110 (H) 07/23/2019 09:10 PM    Glucose (POC) 131 (H) 07/23/2019 04:29 PM    Glucose (POC) 138 (H) 07/23/2019 11:24 AM     Lab Results   Component Value Date/Time    Color YELLOW/STRAW 07/13/2019 07:16 AM    Appearance CLOUDY (A) 07/13/2019 07:16 AM    Specific gravity 1.022 07/13/2019 07:16 AM    pH (UA) 5.5 07/13/2019 07:16 AM    Protein 30 (A) 07/13/2019 07:16 AM    Glucose NEGATIVE  07/13/2019 07:16 AM    Ketone TRACE (A) 07/13/2019 07:16 AM    Bilirubin NEGATIVE  07/13/2019 07:16 AM    Urobilinogen 0.2 07/13/2019 07:16 AM    Nitrites NEGATIVE  07/13/2019 07:16 AM    Leukocyte Esterase NEGATIVE  07/13/2019 07:16 AM    Epithelial cells FEW 07/13/2019 07:16 AM    Bacteria 2+ (A) 07/13/2019 07:16 AM    WBC 5-10 07/13/2019 07:16 AM    RBC 0-5 07/13/2019 07:16 AM     Medications Reviewed:     Current Facility-Administered Medications   Medication Dose Route Frequency    piperacillin-tazobactam (ZOSYN) 3.375 g in 0.9% sodium chloride (MBP/ADV) 100 mL  3.375 g IntraVENous Q8H    furosemide (LASIX) tablet 40 mg  40 mg Oral DAILY    insulin lispro (HUMALOG) injection   SubCUTAneous AC&HS    potassium chloride SR (KLOR-CON 10) tablet 20 mEq  20 mEq Oral DAILY    amLODIPine (NORVASC) tablet 5 mg 5 mg Oral DAILY    lisinopril (PRINIVIL, ZESTRIL) tablet 10 mg  10 mg Oral DAILY    sodium chloride (NS) flush 5-10 mL  5-10 mL IntraVENous PRN    acetaminophen (TYLENOL) tablet 650 mg  650 mg Oral Q4H PRN    sodium chloride (NS) flush 5-40 mL  5-40 mL IntraVENous Q8H    sodium chloride (NS) flush 5-40 mL  5-40 mL IntraVENous PRN    glucose chewable tablet 16 g  4 Tab Oral PRN    dextrose (D50W) injection syrg 12.5-25 g  25-50 mL IntraVENous PRN    glucagon (GLUCAGEN) injection 1 mg  1 mg IntraMUSCular PRN    heparin (porcine) injection 5,000 Units  5,000 Units SubCUTAneous Q8H    albuterol-ipratropium (DUO-NEB) 2.5 MG-0.5 MG/3 ML  3 mL Nebulization Q4H PRN   ______________________________________________________________________  EXPECTED LENGTH OF STAY: 4d 19h  ACTUAL LENGTH OF STAY:          Julio C Rodrigues NP

## 2019-07-24 NOTE — PROGRESS NOTES
Problem: Mobility Impaired (Adult and Pediatric)  Goal: *Acute Goals and Plan of Care (Insert Text)  Description  Physical Therapy Goals  Initiated 7/16/2019  1. Patient will move from supine to sit and sit to supine  in bed with modified independence within 7 day(s). 2.  Patient will perform sit to stand with modified independence within 7 day(s). 3.  Patient will ambulate with modified independence for 75 feet with the least restrictive device within 7 day(s). 4.  Patient will ascend/descend 1 step with walker with modified independence within 7 day(s). Outcome: Progressing Towards Goal    PHYSICAL THERAPY TREATMENT: WEEKLY REASSESSMENT  Patient: Vivi Britt (5 y.o. male)  Date: 7/24/2019  Diagnosis: Sepsis ([facility] [address] [A41.9] <principal problem not specified>       Precautions: Fall, Skin  Chart, physical therapy assessment, plan of care and goals were reviewed. ASSESSMENT:  Based on the objective data described below, the patient presents with CGA level overall for bed mobility. Patient has good sitting balance. Stood with CGA of 2 x 2. The first time he was up on his feet for a minute and side stepped 4 feet to the [facility]. The second time he just stood and stayed up for 1:40. Cooperative and motivated. He moves well but requires assist of 2 for mobility due to his girth for safety. He sat total EOB for 20 minutes and was able to do some ex. On returning to supine we put the bed in the chair position and he was able to do LAQ and ankle pumps. He has fairly short legs and I am not sure we have an appropriate chair for him to sit in that he could get out of. The following are barriers to independence while in acute care:   -Cognitive and/or behavioral: none   -Medical condition: strength, standing balance and pain tolerance    -Other:       Prior level of function: Independent ambulation with no AD.        PLAN:  Patient continues to benefit from skilled intervention to address the above impairments. Continue treatment per established plan of care. Recommend with staff: Bed in chair position as able. Sit EOB for meals. Recommend next PT session: Continue with gait. Discharge recommendations: Rehab at inpatient facility: patient can tolerate 3 hours of therapy (to regain functional baseline patient requires rehab)  If above is not an option then recommend: Rehab at skilled nursing facility (SNF) (to regain functional baseline patient requires rehab)    Patient's barriers to discharging home, in addition to above impairments: lives alone  level of physical assist required to maintain patient safety. Equipment recommendations for successful discharge (if) home: TBD        SUBJECTIVE:   Patient stated I am not going to rehab any time soon.     OBJECTIVE DATA SUMMARY:   Critical Behavior:  Neurologic State: Alert, Appropriate for age  Orientation Level: Oriented X4  Cognition: Follows commands, Appropriate decision making, Appropriate for age attention/concentration, Appropriate safety awareness  Safety/Judgement: Fall prevention, Insight into deficits, Awareness of environment  Functional Mobility Training:  Bed Mobility:  Rolling: Supervision  Supine to Sit: Supervision  Sit to Supine: Contact guard assistance  Scooting: Supervision        Transfers:  Sit to Stand: Contact guard assistance;Assist x2  Stand to Sit: Contact guard assistance;Assist x2                             Balance:  Sitting: Intact  Sitting - Static: Good (unsupported)  Sitting - Dynamic: Good (unsupported)  Standing: Impaired  Standing - Static: Constant support; Fair  Standing - Dynamic : Fair  Ambulation/Gait Training:  Distance (ft): 4 Feet (ft)  Assistive Device: Walker, rolling;Gait belt  Ambulation - Level of Assistance: Contact guard assistance                 Base of Support: Widened     Speed/Yuliana: Pace decreased (<100 feet/min)  Step Length: Right shortened;Left shortened                Therapeutic Exercises: Patient is able to execute a lot of bilat LE LAQ and ankle pumpse. Activity Tolerance:   Good  Please refer to the flowsheet for vital signs taken during this treatment. After treatment patient left:   Supine in bed  Call light within reach  RN notified  Bed in chair position.        COMMUNICATION/COLLABORATION:   The patients plan of care was discussed with: Registered Nurse and Rehabilitation Attendant    Niall Martinez, PT   Time Calculation: 27 mins

## 2019-07-24 NOTE — PROGRESS NOTES
7/24/19; 09:55 -   ALEXANDR:  - [facility] is starting insurance auth process today  - ABX continue; ID has switched patient back to IV ABX as of 7/23  CRM: Carolina Garcia, MPH, [address] [place] [address]; Z: [phone]

## 2019-07-24 NOTE — PROGRESS NOTES
Infectious Diseases Progress Note    Antibiotic [place]:  Vancomycin  --   Zosyn   --   Augmentin  --   Zosyn   -- present      Subjective:     He was able to stand but could only step side to side due to pain. ROS:  Constitutional: no fever, chills, sweats  HEENT: no HA  Resp: no cough or pleuritic CP  CV: no CP  GI: no N, V, D  : no dysuria  Neuro: no confusion      Objective:     Vitals:   Visit Vitals  /83   Pulse 72   Temp 98.1 °F (36.7 °C)   Resp 20   Ht 5' 8\" (1.727 m)   Wt (!) 186.3 kg (410 lb 11.5 oz)   SpO2 96%   BMI 62.45 kg/m²        Tmax:  Temp (24hrs), Av.9 °F (36.6 °C), Min:97.6 °F (36.4 °C), Max:98.1 °F (36.7 °C)      Exam:  General appearance: alert, no distress  Lungs: clear to auscultation bilaterally  Heart: regular rate and rhythm  Abdomen: obese  LLE: The erythema is better in the anteromedial leg but remains prominent in the anterolateral leg> There is a 1 cm ulcer in the proximal anterolateral leg. A few cm distal to this is a tender raised macerated area about 2x1 cm that may open and is tender  Skin: scaly areas both legs (eczema or psoriasis ?)    IV Lines: peripheral    Labs:    Recent Labs     19  0358   WBC 9.8   HGB 9.4*   *   BUN 12   CREA 1.24       Assessment:     1. Acute cellulitis of the LLE -- The clinical history is c/w Streptococcal cellulitis but obesity, diabetes, and venous stasis dermatitis increase the risk of other pathogens also. He has no history of MRSA. He is slowly improving but is very weak     2. NIDDM     3. HTN     4. JOSUÉ     5. CYNDI -- baseline creatinine is not known     6. Hx of presumed DJD    Plan:     1. Continue Zosyn     2.  Watch areas on anterolateral aspect to see if debridement becomes necessary      Gaye Blunt MD

## 2019-07-24 NOTE — WOUND CARE
WOCN Note:      Follow-up visit for left lateral thigh wound:   Assessment:   Patient is A&O x 3, communicative, continent and not mobile ( working with PT). 3 assist to turn, reposition and lift up in the bed. Patient not wearing briefs and bariatric commode in room. Bed: TCBPlus  Patient reports no pain visitor present in room.     Bilateral heels, buttocks and sacral skin intact and without erythema. Heels offloaded on pillows.     Left lateral thigh: 7.0cm x 7.0cm x 0.0cm / pink healing area with scab. Scab lighter in color today, applied very minute amount of carrasyn gel and reapplied mepilex transfer dressing. Recommendations:    -daily: left lateral thigh: apply very small amount of carrasyn Gel and 1 sheet mepilex transfer. ( writer performed today).     Minimize layers of linen/pads under patient to optimize support surface. Turn/reposition approximately every 2 hours and offload heels. Manage incontinence / promote continence; Aloe Vesta to buttocks and sacrum daily and as needed with incontinence care.   Specialty bed: TCB Plus  Discussed above plan with patient and Chan Fang RN.     Transition of Care: Plan to follow weekly and as needed while admitted to hospital.   LAY Nelson [address] AISHAN RN  Wound Care Department  Office: [phone]  Pager: [phone]

## 2019-07-25 LAB
ANION GAP SERPL CALC-SCNC: 7 MMOL/L (ref 5–15)
BUN SERPL-MCNC: 14 MG/DL (ref 6–20)
BUN/CREAT SERPL: 11 (ref 12–20)
CALCIUM SERPL-MCNC: 8.8 MG/DL (ref 8.5–10.1)
CHLORIDE SERPL-SCNC: 106 MMOL/L (ref 97–108)
CO2 SERPL-SCNC: 26 MMOL/L (ref 21–32)
COMMENT, HOLDF: NORMAL
CREAT SERPL-MCNC: 1.31 MG/DL (ref 0.7–1.3)
GLUCOSE BLD STRIP.AUTO-MCNC: 123 MG/DL (ref 65–100)
GLUCOSE BLD STRIP.AUTO-MCNC: 135 MG/DL (ref 65–100)
GLUCOSE BLD STRIP.AUTO-MCNC: 140 MG/DL (ref 65–100)
GLUCOSE BLD STRIP.AUTO-MCNC: 146 MG/DL (ref 65–100)
GLUCOSE SERPL-MCNC: 119 MG/DL (ref 65–100)
POTASSIUM SERPL-SCNC: 4.2 MMOL/L (ref 3.5–5.1)
SAMPLES BEING HELD,HOLD: NORMAL
SERVICE CMNT-IMP: ABNORMAL
SODIUM SERPL-SCNC: 139 MMOL/L (ref 136–145)

## 2019-07-25 PROCEDURE — 65270000032 HC RM SEMIPRIVATE

## 2019-07-25 PROCEDURE — 80048 BASIC METABOLIC PNL TOTAL CA: CPT

## 2019-07-25 PROCEDURE — 74011250636 HC RX REV CODE- 250/636: Performed by: FAMILY MEDICINE

## 2019-07-25 PROCEDURE — 74011250637 HC RX REV CODE- 250/637: Performed by: INTERNAL MEDICINE

## 2019-07-25 PROCEDURE — 74011000258 HC RX REV CODE- 258: Performed by: INTERNAL MEDICINE

## 2019-07-25 PROCEDURE — 74011636637 HC RX REV CODE- 636/637: Performed by: INTERNAL MEDICINE

## 2019-07-25 PROCEDURE — 97530 THERAPEUTIC ACTIVITIES: CPT

## 2019-07-25 PROCEDURE — 82962 GLUCOSE BLOOD TEST: CPT

## 2019-07-25 PROCEDURE — 74011250636 HC RX REV CODE- 250/636: Performed by: INTERNAL MEDICINE

## 2019-07-25 PROCEDURE — 97116 GAIT TRAINING THERAPY: CPT

## 2019-07-25 PROCEDURE — 36415 COLL VENOUS BLD VENIPUNCTURE: CPT

## 2019-07-25 RX ADMIN — INSULIN LISPRO 2 UNITS: 100 INJECTION, SOLUTION INTRAVENOUS; SUBCUTANEOUS at 13:17

## 2019-07-25 RX ADMIN — HEPARIN SODIUM 5000 UNITS: 5000 INJECTION INTRAVENOUS; SUBCUTANEOUS at 19:23

## 2019-07-25 RX ADMIN — Medication 10 ML: at 22:00

## 2019-07-25 RX ADMIN — Medication 10 ML: at 06:00

## 2019-07-25 RX ADMIN — PIPERACILLIN SODIUM,TAZOBACTAM SODIUM 3.38 G: 3; .375 INJECTION, POWDER, FOR SOLUTION INTRAVENOUS at 19:23

## 2019-07-25 RX ADMIN — LISINOPRIL 10 MG: 10 TABLET ORAL at 10:08

## 2019-07-25 RX ADMIN — HEPARIN SODIUM 5000 UNITS: 5000 INJECTION INTRAVENOUS; SUBCUTANEOUS at 03:12

## 2019-07-25 RX ADMIN — FUROSEMIDE 40 MG: 40 TABLET ORAL at 10:08

## 2019-07-25 RX ADMIN — AMLODIPINE BESYLATE 5 MG: 5 TABLET ORAL at 10:08

## 2019-07-25 RX ADMIN — HEPARIN SODIUM 5000 UNITS: 5000 INJECTION INTRAVENOUS; SUBCUTANEOUS at 13:16

## 2019-07-25 RX ADMIN — PIPERACILLIN SODIUM,TAZOBACTAM SODIUM 3.38 G: 3; .375 INJECTION, POWDER, FOR SOLUTION INTRAVENOUS at 13:16

## 2019-07-25 RX ADMIN — INSULIN LISPRO 3 UNITS: 100 INJECTION, SOLUTION INTRAVENOUS; SUBCUTANEOUS at 19:22

## 2019-07-25 RX ADMIN — PIPERACILLIN SODIUM,TAZOBACTAM SODIUM 3.38 G: 3; .375 INJECTION, POWDER, FOR SOLUTION INTRAVENOUS at 03:11

## 2019-07-25 RX ADMIN — POTASSIUM CHLORIDE 20 MEQ: 750 TABLET, EXTENDED RELEASE ORAL at 10:07

## 2019-07-25 NOTE — ROUTINE PROCESS
Bedside and Verbal shift change report given to Constanza Landeros RN (oncoming nurse) by Nimco Carreno RN (offgoing nurse). Report included the following information SBAR, Kardex, Intake/Output, MAR and Recent Results.

## 2019-07-25 NOTE — PROGRESS NOTES
Problem: Self Care Deficits Care Plan (Adult)  Goal: *Acute Goals and Plan of Care (Insert Text)  Description  Occupational Therapy Goals  Initiated 7/16/2019; reviewed and continued for 7 days 7-23  1. Patient will perform grooming standing at sink for 5 min without fatigue or LOB with supervision/set-up within 7 day(s). 2.  Patient will perform lower body dressing with supervision/set-up using AE PRN within 7 day(s). 3.  Patient will perform bathing with supervision/set-up within 7 day(s). 4.  Patient will perform toilet transfers with supervision/set-up within 7 day(s). 5.  Patient will perform all aspects of toileting with supervision/set-up using AE PRN within 7 day(s). 6.  Patient will participate in upper extremity therapeutic exercise/activities with supervision/set-up for 10 minutes within 7 day(s). Met inconsistently- continue for consistency  7. Patient will utilize energy conservation, fall prevention and pain management techniques during functional activities with verbal cues within 7 day(s). Outcome: Progressing Towards Goal   OCCUPATIONAL THERAPY TREATMENT  Patient: Roxana Randall (52 y.o. male)  Date: 7/25/2019  [facility] [A41.9] <principal problem not specified>       Precautions: Fall, Skin  Chart, occupational therapy assessment, plan of care, and goals were reviewed. ASSESSMENT:  Patient received in bed and agreeable to therapy with goal of commode transfer today. Came to sitting EOB with HOB slightly elevated and supervision/use of rails for support, able to sit EOB unsupported. Patient with two attempts to stand pulling up with RW but unable. On third attempt, standing with min A and RW for support. Worked on weight bearing between feet and then taking for small steps staying at bed for safety. Returned sitting and then standing again with CGA and 2 person for safety/supervision, taking steps over to [facility] and transferring to [facility] with CGA.  Min A to stand back up and take steps with RW to [facility], and CGA to supervision for bed mobility including bridging and rolling. Great improvement today and patient working hard throughout. Patient continues to be great candidate for IPR and can tolerate three hours. Highly motivated. Recommend continued work on [facility] transfer for safety and confidence/strengthening. Patient has continued to completed theraband bed exercises. Recommend with nursing patient to complete as able in order to maintain strength, endurance and independence: ADLs with supervision/setup, bed to chair position 3x/day. Thank you for your assistance. Progression toward goals:  ?       Improving appropriately and progressing toward goals  ? Improving slowly and progressing toward goals  ? Not making progress toward goals and plan of care will be adjusted     PLAN:  Patient continues to benefit from skilled intervention to address the above impairments. Continue treatment per established plan of care. Discharge Recommendations:  Rehab  Further Equipment Recommendations for Discharge:  TBD      SUBJECTIVE:   Patient stated The commode was my goal and I did it.     OBJECTIVE DATA SUMMARY:   Cognitive/Behavioral Status:  Neurologic State: Alert  Orientation Level: Appropriate for age  Cognition: Appropriate decision making             Functional Mobility and Transfers for ADLs:  Bed Mobility:  Rolling: Supervision  Supine to Sit: Supervision  Sit to Supine: Moderate assistance(mod A BLE )  Scooting: Supervision    Transfers:  Sit to Stand: Contact guard assistance;Assist x2(with increased assist, CGA.  With assist x1, fear limits)     Bed to Chair: Minimum assistance;Assist x2  Toilet transfer: [facility] with CGA    Balance:  Sitting: Intact  Sitting - Static: Good (unsupported)  Sitting - Dynamic: Good (unsupported)  Standing: Impaired  Standing - Static: Fair;Constant support  Standing - Dynamic : Fair;Constant support    ADL Intervention:            Neuro Re-Education:           Therapeutic Exercises:     Pain:  Pain Scale 1: Numeric (0 - 10)  Pain Intensity 1: 0              Activity Tolerance:   VSS  Please refer to the flowsheet for vital signs taken during this treatment. After treatment:   ? Patient left in no apparent distress sitting up in chair  ? Patient left in no apparent distress in bed  ? Call bell left within reach  ? Nursing notified  ? Caregiver present  ?  Bed alarm activated    COMMUNICATION/COLLABORATION:   The patients plan of care was discussed with: Physical Therapist and Registered Nurse    Molina Chavez  Time Calculation: 32 mins

## 2019-07-25 NOTE — PROGRESS NOTES
[facility]   [address] [facility], [facility] [place] [facility], [facility]  Phone: [phone]   [phone]       Nephrology Progress Note  Ana Villeda     1955     [id number]  Date of Admission : 7/12/2019 07/25/19    CC:  Follow up for CYNDI       Assessment and Plan   CYNDI   - resolved and stable   - continue losartan and lasix   - labs daily     CKD - ?? baseline     Chronic venous insufficiency/dermatitis/wounds  -per Dr Brennan Aden      HTN, relative hypotension     DM     Obesity, JOSUÉ     Interval History:    No new Sx   Continues to make steady progress  No N/V/CP/SOB    Review of Systems: Pertinent items are noted in HPI.     Current Medications:   Current Facility-Administered Medications   Medication Dose Route Frequency    piperacillin-tazobactam (ZOSYN) 3.375 g in 0.9% sodium chloride (MBP/ADV) 100 mL  3.375 g IntraVENous Q8H    furosemide (LASIX) tablet 40 mg  40 mg Oral DAILY    insulin lispro (HUMALOG) injection   SubCUTAneous AC&HS    potassium chloride SR (KLOR-CON 10) tablet 20 mEq  20 mEq Oral DAILY    amLODIPine (NORVASC) tablet 5 mg  5 mg Oral DAILY    lisinopril (PRINIVIL, ZESTRIL) tablet 10 mg  10 mg Oral DAILY    sodium chloride (NS) flush 5-10 mL  5-10 mL IntraVENous PRN    acetaminophen (TYLENOL) tablet 650 mg  650 mg Oral Q4H PRN    sodium chloride (NS) flush 5-40 mL  5-40 mL IntraVENous Q8H    sodium chloride (NS) flush 5-40 mL  5-40 mL IntraVENous PRN    glucose chewable tablet 16 g  4 Tab Oral PRN    dextrose (D50W) injection syrg 12.5-25 g  25-50 mL IntraVENous PRN    glucagon (GLUCAGEN) injection 1 mg  1 mg IntraMUSCular PRN    heparin (porcine) injection 5,000 Units  5,000 Units SubCUTAneous Q8H    albuterol-ipratropium (DUO-NEB) 2.5 MG-0.5 MG/3 ML  3 mL Nebulization Q4H PRN      Allergies   Allergen Reactions    Phenobarbital Unknown (comments)       Objective:  Vitals:    Vitals:    07/24/19 1618 07/24/19 2350 07/25/19 0645 07/25/19 0745   BP: 140/71 147/73  133/72   Pulse: 76 73  78   Resp: 20 20  18   Temp: 98.8 °F (37.1 °C) 99 °F (37.2 °C)  98.7 °F (37.1 °C)   SpO2: 96% 94%  98%   Weight:   (!) 180.7 kg (398 lb 5.9 oz)    Height:         Intake and Output:  07/25 0701 - 07/25 1900  In: -   Out: 400 [Urine:400]  07/23 1901 - 07/25 0700  In: -   Out: 1950 [Urine:1950]    Physical Examination:    General: Obese   Neck:  Supple, no mass  Resp:  CTA  CV:  RRR,  no murmur or rub,chronic LE edema  GI:  Soft, NT, + Bowel sounds, no hepatosplenomegaly  Neurologic:  Non focal  Psych:             AAO x 3 appropriate affect   Skin:  Involving both LE      []    High complexity decision making was performed  []    Patient is at high-risk of decompensation with multiple organ involvement    Lab Data Personally Reviewed: I have reviewed all the pertinent labs, microbiology data and radiology studies during assessment. Recent Labs     07/25/19  0331 07/24/19  1210    136   K 4.2 4.4    106   CO2 26 24   * 137*   BUN 14 14   CREA 1.31* 1.24   CA 8.8 9.2   PHOS  --  3.4   ALB  --  2.3*     No results for input(s): WBC, HGB, HCT, PLT, HGBEXT, HCTEXT, PLTEXT, HGBEXT, HCTEXT, PLTEXT in the last 72 hours.   No results found for: [facility]  Lab Results   Component Value Date/Time    Culture result: NO GROWTH 2 DAYS 07/13/2019 07:16 AM    Culture result: NO GROWTH 5 DAYS 07/13/2019 01:08 AM    Culture result: NO GROWTH 5 DAYS 07/13/2019 01:08 AM     Recent Results (from the past 24 hour(s))   GLUCOSE, POC    Collection Time: 07/24/19  5:28 PM   Result Value Ref Range    Glucose (POC) 151 (H) 65 - 100 mg/dL    Performed by [address] [place] [address] Drive, POC    Collection Time: 07/24/19  8:55 PM   Result Value Ref Range    Glucose (POC) 142 (H) 65 - 100 mg/dL    Performed by Radha Reyes (PCT)    METABOLIC PANEL, BASIC    Collection Time: 07/25/19  3:31 AM   Result Value Ref Range    Sodium 139 136 - 145 mmol/L    Potassium 4.2 3.5 - 5.1 mmol/L    Chloride 106 97 - 108 mmol/L CO2 26 21 - 32 mmol/L    Anion gap 7 5 - 15 mmol/L    Glucose 119 (H) 65 - 100 mg/dL    BUN 14 6 - 20 MG/DL    Creatinine 1.31 (H) 0.70 - 1.30 MG/DL    BUN/Creatinine ratio 11 (L) 12 - 20      GFR est AA >60 >60 ml/min/1.73m2    GFR est non-AA 56 (L) >60 ml/min/1.73m2    Calcium 8.8 8.5 - 10.1 MG/DL   SAMPLES BEING HELD    Collection Time: 07/25/19  3:31 AM   Result Value Ref Range    SAMPLES BEING HELD 1LAV     COMMENT        Add-on orders for these samples will be processed based on acceptable specimen integrity and analyte stability, which may vary by analyte. GLUCOSE, POC    Collection Time: 07/25/19  6:15 AM   Result Value Ref Range    Glucose (POC) 123 (H) 65 - 100 mg/dL    Performed by Elyssa Childers    GLUCOSE, POC    Collection Time: 07/25/19 11:30 AM   Result Value Ref Range    Glucose (POC) 140 (H) 65 - 100 mg/dL    Performed by Berkley Walker            Total time spent with patient:  xxx   min. Care Plan discussed with:  Patient     Family      RN      Consulting Physician [facility],         I have reviewed the flowsheets. Chart and Pertinent Notes have been reviewed. No change in PMH ,family and social history from Consult note.       Joel Guan MD

## 2019-07-25 NOTE — PROGRESS NOTES
Hospitalist Progress Note  Salma Barrera NP  Answering service: [phone] from in house phone  Cell: [phone]   Date of Service:  2019  NAME:  Lalo Dooley  :  1955  MRN:  [id number]    Admission Summary:   Pt presented from [facility] with complaints of redness, swelling, and pain of both the legs, L>R. Pmhx arthritis, asthma, type 2 diabetes mellitus, hypertension, morbid obesity, sleep apnea    Interval history / Subjective:   Pt lying in bed getting ready to nap - was placing CPAP on. No new problems overnight. Discussed results from LLE ultrasound and continued discussion with ID about abx therapy. Assessment & Plan:     Sepsis 2/2 left lower extremity cellulitis: improved  - hx chronic venous stasis changes with edema  - lactic acidosis on POA: resolved  - leucocytosis: resolved  - blood cx: no growth (final);  wound cx was not sent on admission  - discontinued vanc  and changed IV zosyn to po augmentin , however, Dr. Dorothy Tyler changed back to Zosyn  as he felt as though his LLE is not looking much better. Discussed case with Dr. Dorothy Tyler .  - discussed care with ID. Left lower leg wound appears to look worse  - ultrasound of the LLE: Echogenic skin thickening at the site of the first more cephalad wound in the LLE anteriorly, without a discrete collection. Hypoechoic phlegmon suggested beneath the skin at the site of the more caudad wound in the left lower leg, without a discrete drainable abscess or fluid collection at this time  - continue wound care.    - once Tydiogenes Zengmaria g goes through, will discuss abx mgmt once again with ID     Acute metabolic encephalopathy: resolved     CYNDI: improved/resolved   - nephrology on board  - cr unknown baseline  - nephro ok with lasix and lisinopril   - monitor this as pt is back on zosyn     SOB on exertion: multifactorial  - improving   - CXR: Stable to slightly increased diffuse interstitial prominence. Correlate for developing interstitial edema     Hx DM:   - A1c 6.7, Blood glucose 123-151  - Metformin on hold. Continue with SSI    Hx HTN: c/w amlodipine, lisinopril, lasix     Hx JOSUÉ: has CPAP at bedside     Morbid Obesity: Body mass index is 62.45 kg/m²  - weight loss recommended, mobility a factor     Code status: Full  DVT prophylaxis: Heparin  Care Plan discussed with: patient  Disposition: Inpatient Rehab when insurance authorization obtained and pt stable for d/c     Hospital Problems  Never Reviewed          Codes Class Noted POA    Sepsis ([facility] [address] ICD-10-CM: A41.9  ICD-9-CM: 038.9, 995.91  7/13/2019 Unknown            Review of Systems:   Denies HA. No chest pain or pressure. No shortness of breath. No GI complaints, no N/V. Vital Signs:    Last 24hrs VS reviewed since prior progress note. Most recent are:  Visit Vitals  /72   Pulse 78   Temp 98.7 °F (37.1 °C)   Resp 18   Ht 5' 8\" (1.727 m)   Wt (!) 180.7 kg (398 lb 5.9 oz)   SpO2 98%   BMI 60.57 kg/m²       Intake/Output Summary (Last 24 hours) at 7/25/2019 1354  Last data filed at 7/25/2019 1254  Gross per 24 hour   Intake    Output 1600 ml   Net -1600 ml     Physical Examination:        Constitutional:  No acute distress, cooperative, pleasant    ENT:  Oral MM, oropharynx benign    Resp:  No accessory muscle use and on RA    GI:  Obese, + BM    Musculoskeletal:  No edema, warm, 2+ pulses throughout    Neurologic:  Moves all extremities. AAOx3   Skin: Dressings dry and intact      Data Review:   Review and/or order of clinical lab test  Review and/or order of tests in the radiology section of CPT  Review and/or order of tests in the medicine section of CPT    Labs:     No results for input(s): WBC, HGB, HCT, PLT, HGBEXT, HCTEXT, PLTEXT, HGBEXT, HCTEXT, PLTEXT in the last 72 hours.   Recent Labs     07/25/19  0331 07/24/19  1210    136   K 4.2 4.4    106   CO2 26 24   BUN 14 14   CREA 1.31* 1. 24   * 137*   CA 8.8 9.2   PHOS  --  3.4     Recent Labs     07/24/19  1210   ALB 2.3*     No results for input(s): INR, PTP, APTT in the last 72 hours. No lab exists for component: INREXT, INREXT   No results for input(s): FE, TIBC, PSAT, FERR in the last 72 hours. No results found for: FOL, RBCF   No results for input(s): PH, PCO2, PO2 in the last 72 hours. No results for input(s): CPK, CKNDX, TROIQ in the last 72 hours.     No lab exists for component: CPKMB  No results found for: CHOL, CHOLX, CHLST, CHOLV, HDL, LDL, LDLC, DLDLP, TGLX, TRIGL, TRIGP, CHHD, CHHDX  Lab Results   Component Value Date/Time    Glucose (POC) 140 (H) 07/25/2019 11:30 AM    Glucose (POC) 123 (H) 07/25/2019 06:15 AM    Glucose (POC) 142 (H) 07/24/2019 08:55 PM    Glucose (POC) 151 (H) 07/24/2019 05:28 PM    Glucose (POC) 121 (H) 07/24/2019 11:28 AM     Lab Results   Component Value Date/Time    Color YELLOW/STRAW 07/13/2019 07:16 AM    Appearance CLOUDY (A) 07/13/2019 07:16 AM    Specific gravity 1.022 07/13/2019 07:16 AM    pH (UA) 5.5 07/13/2019 07:16 AM    Protein 30 (A) 07/13/2019 07:16 AM    Glucose NEGATIVE  07/13/2019 07:16 AM    Ketone TRACE (A) 07/13/2019 07:16 AM    Bilirubin NEGATIVE  07/13/2019 07:16 AM    Urobilinogen 0.2 07/13/2019 07:16 AM    Nitrites NEGATIVE  07/13/2019 07:16 AM    Leukocyte Esterase NEGATIVE  07/13/2019 07:16 AM    Epithelial cells FEW 07/13/2019 07:16 AM    Bacteria 2+ (A) 07/13/2019 07:16 AM    WBC 5-10 07/13/2019 07:16 AM    RBC 0-5 07/13/2019 07:16 AM     Medications Reviewed:     Current Facility-Administered Medications   Medication Dose Route Frequency    piperacillin-tazobactam (ZOSYN) 3.375 g in 0.9% sodium chloride (MBP/ADV) 100 mL  3.375 g IntraVENous Q8H    furosemide (LASIX) tablet 40 mg  40 mg Oral DAILY    insulin lispro (HUMALOG) injection   SubCUTAneous AC&HS    potassium chloride SR (KLOR-CON 10) tablet 20 mEq  20 mEq Oral DAILY    amLODIPine (NORVASC) tablet 5 mg  5 mg Oral DAILY    lisinopril (PRINIVIL, ZESTRIL) tablet 10 mg  10 mg Oral DAILY    sodium chloride (NS) flush 5-10 mL  5-10 mL IntraVENous PRN    acetaminophen (TYLENOL) tablet 650 mg  650 mg Oral Q4H PRN    sodium chloride (NS) flush 5-40 mL  5-40 mL IntraVENous Q8H    sodium chloride (NS) flush 5-40 mL  5-40 mL IntraVENous PRN    glucose chewable tablet 16 g  4 Tab Oral PRN    dextrose (D50W) injection syrg 12.5-25 g  25-50 mL IntraVENous PRN    glucagon (GLUCAGEN) injection 1 mg  1 mg IntraMUSCular PRN    heparin (porcine) injection 5,000 Units  5,000 Units SubCUTAneous Q8H    albuterol-ipratropium (DUO-NEB) 2.5 MG-0.5 MG/3 ML  3 mL Nebulization Q4H PRN   ______________________________________________________________________  EXPECTED LENGTH OF STAY: 4d 19h  ACTUAL LENGTH OF STAY:          [address], NP

## 2019-07-25 NOTE — PROGRESS NOTES
7/25/19 -   ALEXANDR:  - Medically accepted to [facility]  - Ins auth initiated 7/24; auth is still pending  CRM: Hendricks Epley, MPH, [address] [place] [address]; Z: [phone]

## 2019-07-25 NOTE — PROGRESS NOTES
Problem: Mobility Impaired (Adult and Pediatric)  Goal: *Acute Goals and Plan of Care (Insert Text)  Description  Physical Therapy Goals  Revised 7/24/2019  1. Patient will move from supine to sit and sit to supine , scoot up and down and roll side to side in bed with supervision/set-up within 7 day(s). 2.  Patient will transfer from bed to chair and chair to bed with supervision/set-up using the least restrictive device within 7 day(s). 3.  Patient will perform sit to stand with supervision/set-up within 7 day(s). 4.  Patient will ambulate with minimal assistance/contact guard assist for 20 feet with the least restrictive device within 7 day(s). Physical Therapy Goals  Initiated 7/16/2019  1. Patient will move from supine to sit and sit to supine  in bed with modified independence within 7 day(s). 2.  Patient will perform sit to stand with modified independence within 7 day(s). 3.  Patient will ambulate with modified independence for 75 feet with the least restrictive device within 7 day(s). 4.  Patient will ascend/descend 1 step with walker with modified independence within 7 day(s). Outcome: Progressing Towards Goal     PHYSICAL THERAPY TREATMENT  Patient: Adrianna Rodrigues (86 y.o. male)  Date: 7/25/2019  Diagnosis: Sepsis ([facility] [address] [A41.9] <principal problem not specified>       Precautions: Fall, Skin  Chart, physical therapy assessment, plan of care and goals were reviewed. ASSESSMENT:  Arrived to perform PT treatment, OT at [facility] with patient after x2 unsuccessful stands. Pt required cotreat and assist x2 for safety and confidence to perform CGA-min A for mobility to stand and weight shift with side steps. Pt able to transfer to [facility] with sidestepping along bed and then turning, and then pushing up off the [facility] to stand and transfer back to bed. Pt demos CGA to supervision for bed mobility including bridging and rolling. Great improvement today and patient working hard throughout.  Patient continues to be great candidate for IPR and can tolerate three hours. Highly motivated. Recommend continued work on [facility] transfer for safety and confidence/strengthening. Patient has continued to completed LE bed exercises. Progression toward goals:  ?    Improving appropriately and progressing toward goals  ? Improving slowly and progressing toward goals  ? Not making progress toward goals and plan of care will be adjusted     PLAN:  Patient continues to benefit from skilled intervention to address the above impairments. Continue treatment per established plan of care. Discharge Recommendations:  Rehab at Inpatient Facility  Further Equipment Recommendations for Discharge:  rolling walker     SUBJECTIVE:   Patient stated I am not sure I can do it.  patient is fearful at times due to his weight and requires x2 for safety    OBJECTIVE DATA SUMMARY:   Critical Behavior:  Neurologic State: Alert  Orientation Level: Appropriate for age  Cognition: Appropriate decision making  Safety/Judgement: Fall prevention, Insight into deficits, Awareness of environment  Functional Mobility Training:  Bed Mobility:  Rolling: Supervision  Supine to Sit: Supervision  Sit to Supine: Moderate assistance(mod A BLE )  Scooting: Supervision        Transfers:  Sit to Stand: Contact guard assistance;Assist x2(with increased assist, CGA.  With assist x1, fear limits)  Stand to Sit: Contact guard assistance;Assist x2        Bed to Chair: Minimum assistance;Assist x2                    Balance:  Sitting: Intact  Sitting - Static: Good (unsupported)  Sitting - Dynamic: Good (unsupported)  Standing: Impaired  Standing - Static: Fair;Constant support  Standing - Dynamic : Fair;Constant support  Ambulation/Gait Training:  Distance (ft): 5 Feet (ft)(5ft +5ft )  Assistive Device: Walker, rolling;Gait belt  Ambulation - Level of Assistance: Contact guard assistance                 Base of Support: Widened     Speed/Yuliana: Pace decreased (<100 feet/min)  Step Length: Left shortened;Right shortened               Pain:  Pain Scale 1: Numeric (0 - 10)  Pain Intensity 1: 0              Activity Tolerance:   good  Please refer to the flowsheet for vital signs taken during this treatment. After treatment:   ?    Patient left in no apparent distress sitting up in chair  ? Patient left in no apparent distress in bed  ? Call bell left within reach  ? Nursing notified  ? Caregiver present  ?     Bed alarm activated    COMMUNICATION/COLLABORATION:   The patients plan of care was discussed with: Registered Nurse and     Inés Wright, PT   Time Calculation: 24 mins

## 2019-07-26 VITALS
BODY MASS INDEX: 47.74 KG/M2 | DIASTOLIC BLOOD PRESSURE: 75 MMHG | OXYGEN SATURATION: 95 % | HEART RATE: 73 BPM | WEIGHT: 315 LBS | HEIGHT: 68 IN | RESPIRATION RATE: 18 BRPM | SYSTOLIC BLOOD PRESSURE: 124 MMHG | TEMPERATURE: 98.4 F

## 2019-07-26 PROBLEM — A41.9 SEPSIS (HCC): Status: RESOLVED | Noted: 2019-07-13 | Resolved: 2019-07-26

## 2019-07-26 LAB
ANION GAP SERPL CALC-SCNC: 6 MMOL/L (ref 5–15)
BUN SERPL-MCNC: 17 MG/DL (ref 6–20)
BUN/CREAT SERPL: 13 (ref 12–20)
CALCIUM SERPL-MCNC: 9.4 MG/DL (ref 8.5–10.1)
CHLORIDE SERPL-SCNC: 105 MMOL/L (ref 97–108)
CO2 SERPL-SCNC: 26 MMOL/L (ref 21–32)
CREAT SERPL-MCNC: 1.36 MG/DL (ref 0.7–1.3)
GLUCOSE BLD STRIP.AUTO-MCNC: 122 MG/DL (ref 65–100)
GLUCOSE BLD STRIP.AUTO-MCNC: 130 MG/DL (ref 65–100)
GLUCOSE BLD STRIP.AUTO-MCNC: 131 MG/DL (ref 65–100)
GLUCOSE SERPL-MCNC: 130 MG/DL (ref 65–100)
POTASSIUM SERPL-SCNC: 4.1 MMOL/L (ref 3.5–5.1)
SERVICE CMNT-IMP: ABNORMAL
SODIUM SERPL-SCNC: 137 MMOL/L (ref 136–145)

## 2019-07-26 PROCEDURE — 74011000258 HC RX REV CODE- 258: Performed by: INTERNAL MEDICINE

## 2019-07-26 PROCEDURE — 82962 GLUCOSE BLOOD TEST: CPT

## 2019-07-26 PROCEDURE — 80048 BASIC METABOLIC PNL TOTAL CA: CPT

## 2019-07-26 PROCEDURE — 97116 GAIT TRAINING THERAPY: CPT

## 2019-07-26 PROCEDURE — 36415 COLL VENOUS BLD VENIPUNCTURE: CPT

## 2019-07-26 PROCEDURE — 74011250636 HC RX REV CODE- 250/636: Performed by: INTERNAL MEDICINE

## 2019-07-26 PROCEDURE — 74011250637 HC RX REV CODE- 250/637: Performed by: INTERNAL MEDICINE

## 2019-07-26 PROCEDURE — 74011250636 HC RX REV CODE- 250/636: Performed by: FAMILY MEDICINE

## 2019-07-26 PROCEDURE — 97535 SELF CARE MNGMENT TRAINING: CPT

## 2019-07-26 RX ORDER — FUROSEMIDE 40 MG/1
40 TABLET ORAL DAILY
Qty: 30 TAB | Refills: 1 | Status: SHIPPED
Start: 2019-07-26

## 2019-07-26 RX ORDER — ACETAMINOPHEN 325 MG/1
650 TABLET ORAL
Qty: 60 TAB | Refills: 0 | Status: SHIPPED
Start: 2019-07-26

## 2019-07-26 RX ORDER — AMOXICILLIN AND CLAVULANATE POTASSIUM 875; 125 MG/1; MG/1
1 TABLET, FILM COATED ORAL EVERY 12 HOURS
Qty: 8 TAB | Refills: 0 | Status: SHIPPED | OUTPATIENT
Start: 2019-07-27 | End: 2019-07-31

## 2019-07-26 RX ADMIN — Medication 10 ML: at 03:37

## 2019-07-26 RX ADMIN — HEPARIN SODIUM 5000 UNITS: 5000 INJECTION INTRAVENOUS; SUBCUTANEOUS at 13:17

## 2019-07-26 RX ADMIN — PIPERACILLIN SODIUM,TAZOBACTAM SODIUM 3.38 G: 3; .375 INJECTION, POWDER, FOR SOLUTION INTRAVENOUS at 13:17

## 2019-07-26 RX ADMIN — HEPARIN SODIUM 5000 UNITS: 5000 INJECTION INTRAVENOUS; SUBCUTANEOUS at 03:31

## 2019-07-26 RX ADMIN — AMLODIPINE BESYLATE 5 MG: 5 TABLET ORAL at 09:42

## 2019-07-26 RX ADMIN — LISINOPRIL 10 MG: 10 TABLET ORAL at 09:42

## 2019-07-26 RX ADMIN — ACETAMINOPHEN 650 MG: 325 TABLET ORAL at 16:38

## 2019-07-26 RX ADMIN — FUROSEMIDE 40 MG: 40 TABLET ORAL at 09:42

## 2019-07-26 RX ADMIN — POTASSIUM CHLORIDE 20 MEQ: 750 TABLET, EXTENDED RELEASE ORAL at 09:42

## 2019-07-26 RX ADMIN — ACETAMINOPHEN 650 MG: 325 TABLET ORAL at 03:31

## 2019-07-26 RX ADMIN — PIPERACILLIN SODIUM,TAZOBACTAM SODIUM 3.38 G: 3; .375 INJECTION, POWDER, FOR SOLUTION INTRAVENOUS at 03:33

## 2019-07-26 NOTE — PROGRESS NOTES
[facility]   [address] [facility], [address] [place] [address], [facility]  Phone: [phone]   [phone]       Nephrology Progress Note  Corinna Calabrese     1955     [id number]  Date of Admission : 7/12/2019 07/26/19    CC:  Follow up for CYNDI       Assessment and Plan   CYNDI   - resolved and stable   - continue losartan and lasix   - labs weekly at rehab     CKD - ?? baseline     Chronic venous insufficiency/dermatitis/wounds  -per Dr Jaquelin Bliss      HTN, relative hypotension     DM     Obesity, JOSUÉ     Interval History:    No new Sx   awaiitng d/c to [facility]   No N/V/CP/SOB    Review of Systems: Pertinent items are noted in HPI.     Current Medications:   Current Facility-Administered Medications   Medication Dose Route Frequency    piperacillin-tazobactam (ZOSYN) 3.375 g in 0.9% sodium chloride (MBP/ADV) 100 mL  3.375 g IntraVENous Q8H    furosemide (LASIX) tablet 40 mg  40 mg Oral DAILY    insulin lispro (HUMALOG) injection   SubCUTAneous AC&HS    potassium chloride SR (KLOR-CON 10) tablet 20 mEq  20 mEq Oral DAILY    amLODIPine (NORVASC) tablet 5 mg  5 mg Oral DAILY    lisinopril (PRINIVIL, ZESTRIL) tablet 10 mg  10 mg Oral DAILY    sodium chloride (NS) flush 5-10 mL  5-10 mL IntraVENous PRN    acetaminophen (TYLENOL) tablet 650 mg  650 mg Oral Q4H PRN    sodium chloride (NS) flush 5-40 mL  5-40 mL IntraVENous Q8H    sodium chloride (NS) flush 5-40 mL  5-40 mL IntraVENous PRN    glucose chewable tablet 16 g  4 Tab Oral PRN    dextrose (D50W) injection syrg 12.5-25 g  25-50 mL IntraVENous PRN    glucagon (GLUCAGEN) injection 1 mg  1 mg IntraMUSCular PRN    heparin (porcine) injection 5,000 Units  5,000 Units SubCUTAneous Q8H    albuterol-ipratropium (DUO-NEB) 2.5 MG-0.5 MG/3 ML  3 mL Nebulization Q4H PRN      Allergies   Allergen Reactions    Phenobarbital Unknown (comments)       Objective:  Vitals:    Vitals:    07/25/19 2258 07/25/19 2300 07/26/19 0707 07/26/19 0829   BP: 120/72 125/85  123/74   Pulse: 70 85  72   Resp: 18 18  18   Temp: 98.5 °F (36.9 °C) 98.3 °F (36.8 °C)  98.6 °F (37 °C)   SpO2: 98% 99%     Weight:   (!) 176.7 kg (389 lb 8.9 oz)    Height:         Intake and Output:  No intake/output data recorded. 07/24 1901 - 07/26 0700  In: 56 [P.O.:740]  Out: 2750 [Urine:2750]    Physical Examination:    General: Obese   Neck:  Supple, no mass  Resp:  CTA  CV:  RRR,  no murmur or rub,chronic LE edema  GI:  Soft, NT, + Bowel sounds, no hepatosplenomegaly  Neurologic:  Non focal  Psych:             AAO x 3 appropriate affect   Skin:  Involving both LE      []    High complexity decision making was performed  []    Patient is at high-risk of decompensation with multiple organ involvement    Lab Data Personally Reviewed: I have reviewed all the pertinent labs, microbiology data and radiology studies during assessment. Recent Labs     07/26/19  0342 07/25/19  0331 07/24/19  1210    139 136   K 4.1 4.2 4.4    106 106   CO2 26 26 24   * 119* 137*   BUN 17 14 14   CREA 1.36* 1.31* 1.24   CA 9.4 8.8 9.2   PHOS  --   --  3.4   ALB  --   --  2.3*     No results for input(s): WBC, HGB, HCT, PLT, HGBEXT, HCTEXT, PLTEXT, HGBEXT, HCTEXT, PLTEXT in the last 72 hours.   No results found for: SDES  Lab Results   Component Value Date/Time    Culture result: NO GROWTH 2 DAYS 07/13/2019 07:16 AM    Culture result: NO GROWTH 5 DAYS 07/13/2019 01:08 AM    Culture result: NO GROWTH 5 DAYS 07/13/2019 01:08 AM     Recent Results (from the past 24 hour(s))   GLUCOSE, POC    Collection Time: 07/25/19 11:30 AM   Result Value Ref Range    Glucose (POC) 140 (H) 65 - 100 mg/dL    Performed by Rush Whiteclay    GLUCOSE, POC    Collection Time: 07/25/19  5:07 PM   Result Value Ref Range    Glucose (POC) 146 (H) 65 - 100 mg/dL    Performed by Rush Whiteclay    GLUCOSE, POC    Collection Time: 07/25/19  9:23 PM   Result Value Ref Range    Glucose (POC) 135 (H) 65 - 100 mg/dL    Performed by Andrei Reynolds    METABOLIC PANEL, BASIC    Collection Time: 07/26/19  3:42 AM   Result Value Ref Range    Sodium 137 136 - 145 mmol/L    Potassium 4.1 3.5 - 5.1 mmol/L    Chloride 105 97 - 108 mmol/L    CO2 26 21 - 32 mmol/L    Anion gap 6 5 - 15 mmol/L    Glucose 130 (H) 65 - 100 mg/dL    BUN 17 6 - 20 MG/DL    Creatinine 1.36 (H) 0.70 - 1.30 MG/DL    BUN/Creatinine ratio 13 12 - 20      GFR est AA >60 >60 ml/min/1.73m2    GFR est non-AA 53 (L) >60 ml/min/1.73m2    Calcium 9.4 8.5 - 10.1 MG/DL   GLUCOSE, POC    Collection Time: 07/26/19  6:58 AM   Result Value Ref Range    Glucose (POC) 122 (H) 65 - 100 mg/dL    Performed by Sis Hyman            Total time spent with patient:  xxx   min. Care Plan discussed with:  Patient     Family      RN      Consulting Physician [facility],         I have reviewed the flowsheets. Chart and Pertinent Notes have been reviewed. No change in PMH ,family and social history from Consult note.       Lito Nunez MD

## 2019-07-26 NOTE — PROGRESS NOTES
Problem: Mobility Impaired (Adult and Pediatric)  Goal: *Acute Goals and Plan of Care (Insert Text)  Description  Physical Therapy Goals  Revised 7/24/2019  1. Patient will move from supine to sit and sit to supine , scoot up and down and roll side to side in bed with supervision/set-up within 7 day(s). 2.  Patient will transfer from bed to chair and chair to bed with supervision/set-up using the least restrictive device within 7 day(s). 3.  Patient will perform sit to stand with supervision/set-up within 7 day(s). 4.  Patient will ambulate with minimal assistance/contact guard assist for 20 feet with the least restrictive device within 7 day(s). Physical Therapy Goals  Initiated 7/16/2019  1. Patient will move from supine to sit and sit to supine  in bed with modified independence within 7 day(s). 2.  Patient will perform sit to stand with modified independence within 7 day(s). 3.  Patient will ambulate with modified independence for 75 feet with the least restrictive device within 7 day(s). 4.  Patient will ascend/descend 1 step with walker with modified independence within 7 day(s). Outcome: Progressing Towards Goal      PHYSICAL THERAPY TREATMENT  Patient: Maynor St (43 y.o. male)  Date: 7/26/2019  Diagnosis: Sepsis ([facility] [A41.9] <principal problem not specified>       Precautions: Fall, Skin  Chart, physical therapy assessment, plan of care and goals were reviewed. ASSESSMENT:  Pt demos continued fear limiting movement at times. Pt states that he wont be able to get out of a chair, so attempted this task prior to DC today to encompass to aid patient with improved mindset and achieve goal of sitting in chair. Pt transferred with min A and RW to chair elevated to knee level with folded blanket, and stand > sit with CGA. Pt takes sitting rest break, then with min A x1 and CGA x1 by brother, patient demos hard \"rocking\" and able to get momentum up out of the chair and back to bed.  Pt very happy to achieve this goal and states \"I really didn't think I could do it. \"  Progression toward goals:  ?    Improving appropriately and progressing toward goals  ? Improving slowly and progressing toward goals  ? Not making progress toward goals and plan of care will be adjusted     PLAN:  Patient continues to benefit from skilled intervention to address the above impairments. Continue treatment per established plan of care. Discharge Recommendations:  Inpatient Rehab  Further Equipment Recommendations for Discharge:  rolling walker     SUBJECTIVE:   Patient stated I really didn't think I could do it.     OBJECTIVE DATA SUMMARY:   Critical Behavior:  Neurologic State: Alert  Orientation Level: Oriented X4  Cognition: Follows commands  Safety/Judgement: Fall prevention, Insight into deficits  Functional Mobility Training:  Bed Mobility:  Rolling: Setup;Supervision  Supine to Sit: Setup;Supervision  Sit to Supine: Setup;Supervision  Scooting: Setup;Supervision        Transfers:  Sit to Stand: Minimum assistance;Contact guard assistance(lower chair)  Stand to Sit: Minimum assistance;Contact guard assistance(lower chair)        Bed to Chair: Minimum assistance                    Balance:  Sitting: Intact  Standing: Impaired; With support  Standing - Static: Fair  Standing - Dynamic : Fair  Ambulation/Gait Training:  Distance (ft): 5 Feet (ft)  Assistive Device: Walker, rolling;Gait belt  Ambulation - Level of Assistance: Contact guard assistance        Gait Abnormalities: Antalgic;Decreased step clearance;Shuffling gait        Base of Support: Widened     Speed/Yuliana: Pace decreased (<100 feet/min)  Step Length: Right shortened;Left shortened               Pain:  Pain Scale 1: Numeric (0 - 10)  Pain Intensity 1: 0              Activity Tolerance:   fair  Please refer to the flowsheet for vital signs taken during this treatment.   After treatment:   ?    Patient left in no apparent distress sitting up in chair  ? Patient left in no apparent distress in bed  ? Call bell left within reach  ? Nursing notified  ? Caregiver present  ?     Bed alarm activated    COMMUNICATION/COLLABORATION:   The patients plan of care was discussed with: Registered Nurse and     Simona Wright, PT   Time Calculation: 31 mins

## 2019-07-26 NOTE — DISCHARGE SUMMARY
Discharge Summary     PATIENT ID: Vicky Johnson  MRN: [id number]   YOB: 1955    DATE OF ADMISSION: 7/12/2019 11:35 PM    DATE OF DISCHARGE: 7/26/2019  PRIMARY CARE PROVIDER: Jose J Lockwood MD   ATTENDING PHYSICIAN: Jiles Ormond, MD  DISCHARGING PROVIDER: Safia Valencia NP. To contact this individual call [phone] and ask the  to page. If unavailable ask to be transferred the Adult Hospitalist Department. CONSULTATIONS: IP CONSULT TO NEPHROLOGY  IP CONSULT TO INFECTIOUS DISEASES    ADMITTING DIAGNOSES & HOSPITAL COURSE:   Pt presented from [facility] with complaints of redness, swelling, and pain of both the legs, L>R.       Pmhx arthritis, asthma, type 2 diabetes mellitus, hypertension, morbid obesity, sleep apnea    DISCHARGE DIAGNOSES / PLAN:      Sepsis 2/2 left lower extremity cellulitis: improved  - hx chronic venous stasis changes with edema  - lactic acidosis: resolved  - leukocytosis: resolved  - blood cx: no growth (final);  wound cx was not sent on admission  - discontinued vanc 7/16;  changed IV zosyn to po augmentin 7/22, however, Dr. Lian Davila changed back to Zosyn 7/23 as he felt as though his LLE is not looking much better. - ultrasound of the LLE: Echogenic skin thickening at the site of the first more cephalad wound in the LLE anteriorly, without a discrete collection. Hypoechoic phlegmon suggested beneath the skin at the site of the more caudad wound in the left lower leg, without a discrete drainable abscess or fluid collection at this time  - continue wound care.    - 7/26: insurance authorization completed and pt to be discharged to [facility]  - discussed case with ID: agrees with d/c and plan for 4 more days of augmentin     Acute metabolic encephalopathy: resolved     CYNDI: improved/resolved   - nephrology on board  - cr unknown baseline  - nephro ok with lasix and lisinopril   - weekly labs     SOB on exertion: multifactorial 7/20 - improving   - CXR: Stable to slightly increased diffuse interstitial prominence. Correlate for developing interstitial edema  - continue with daily lasix     Hx DM:   - A1c 6.7, Blood glucose 122-146  - Resume Metformin on d/c and check blood glucose BID     Hx HTN: c/w amlodipine, lisinopril, lasix   - BP stable     Hx JOSUÉ: continue with CPAP during sleep and naps     Morbid Obesity: BMI is 62.45 kg/m²  - weight loss recommended, mobility a factor   - pt was taking Contrave under PCP direction, consider to resume this once acute illness is resolved        FOLLOW UP APPOINTMENTS:    Follow-up Information     Follow up With Specialties Details Why Contact Info    Laura Rodgers MD Family Practice In 1 week or facility provider [address] [place] [address]      Brisa Angeles MD Nephrology   [address] [facility] [place] [address] [phone]      Dermatology   after rehab is complete          ADDITIONAL CARE RECOMMENDATIONS:   Weekly labs, may fax results to Dr. Alireza Keller ([phone])    Will have 4 more days of oral antibiotics (starting 7/27/19)    CPAP nightly and during naps    Check blood glucose twice daily    DIET: Diabetic Diet    ACTIVITY: PT/OT Eval and Treat    WOUND CARE:   Daily: left lateral thigh: apply very small amount of carrasyn Gel and 1 sheet mepilex transfer    EQUIPMENT needed: as per PT/OT    DISCHARGE MEDICATIONS:  Current Discharge Medication List      START taking these medications    Details   amoxicillin-clavulanate (AUGMENTIN) 875-125 mg per tablet Take 1 Tab by mouth every twelve (12) hours for 4 days. Qty: 8 Tab, Refills: 0      acetaminophen (TYLENOL) 325 mg tablet Take 2 Tabs by mouth every four (4) hours as needed for Pain. Qty: 60 Tab, Refills: 0         CONTINUE these medications which have CHANGED    Details   furosemide (LASIX) 40 mg tablet Take 1 Tab by mouth daily.   Qty: 30 Tab, Refills: 1         CONTINUE these medications which have NOT CHANGED    Details   lisinopril (PRINIVIL, ZESTRIL) 10 mg tablet Take 10 mg by mouth daily. amLODIPine (NORVASC) 5 mg tablet Take 5 mg by mouth daily. metFORMIN (GLUCOPHAGE) 500 mg tablet Take 500 mg by mouth daily (with breakfast). potassium chloride SR (KLOR-CON 10) 10 mEq tablet Take 20 mEq by mouth daily. meloxicam (MOBIC) 15 mg tablet Take 15 mg by mouth daily as needed for Pain. STOP taking these medications       naltrexone-buPROPion (CONTRAVE) 8-90 mg TbER ER tablet Comments:   Reason for Stopping:             NOTIFY YOUR PHYSICIAN FOR ANY OF THE FOLLOWING:   Fever over 101 degrees for 24 hours. Chest pain, shortness of breath, fever, chills, nausea, vomiting, diarrhea, change in mentation, falling, weakness, bleeding. Severe pain or pain not relieved by medications. Or, any other signs or symptoms that you may have questions about. DISPOSITION:    Home With:   OT  PT  HH  RN      xx Long term SNF/Inpatient Rehab    Independent/assisted living    Hospice    Other:     PATIENT CONDITION AT DISCHARGE:   Functional status   xx Poor    xx Deconditioned     Independent      Cognition    xx Lucid     Forgetful     Dementia      Catheters/lines (plus indication)    Cerda     PICC     PEG    xx None      Code status   xx  Full code     DNR      PHYSICAL EXAMINATION AT DISCHARGE:  /75   Pulse 76   Temp 98.6 °F (37 °C)   Resp 18   Ht 5' 8\" (1.727 m)   Wt (!) 176.7 kg (389 lb 8.9 oz)   SpO2 98%   BMI 59.23 kg/m²     Pt in bed, looking forward to going to Inpatient Rehab today. We discussed antibiotic management and have spoken with ID: to have 4 more days of augmentin. Under direction of Dr. Glenn Swenson, pt was taking Contrave, a medication in weight loss management. He indicated before he became ill, he had lst 15 pounds and he was feeling like the medication was helping him with better foods choice, decreased consumption and satiety.  Once his is well, he plans on discussing resumption with his PCP. Constitutional:  No acute distress, cooperative, pleasant    ENT:  Oral MM, oropharynx benign    Resp:  No accessory muscle use and on RA    GI:  Obese, + BM    Musculoskeletal:  No edema, warm, 2+ pulses throughout    Neurologic:  Moves all extremities. AAOx3   Skin:  Dressings dry and intact. Dry flaky scabbed area to LLE. Non draining.                 CHRONIC MEDICAL DIAGNOSES:  Problem List as of 7/26/2019 Date Reviewed: 7/26/2019          Codes Class Noted - Resolved    RESOLVED: Sepsis ([facility] ICD-10-CM: A41.9  ICD-9-CM: 038.9, 995.91  7/13/2019 - 7/26/2019            Greater than 30 minutes were spent with the patient on counseling and coordination of care    Signed:   Tangela Cross NP  7/26/2019  2:24 PM

## 2019-07-26 NOTE — PROGRESS NOTES
Report called to [phone]. [facility] has not yet arrived to transport pt. emtala forms completed and signed. Wound care completed and supplies being sent with pt. Pt's brother took belongings including CPAP machine home already. Two bags of clothing at bedside to be transferred with pt.

## 2019-07-26 NOTE — PROGRESS NOTES
Problem: Self Care Deficits Care Plan (Adult)  Goal: *Acute Goals and Plan of Care (Insert Text)  Description  Occupational Therapy Goals  Initiated 7/16/2019; reviewed and continued for 7 days 7-23  1. Patient will perform grooming standing at sink for 5 min without fatigue or LOB with supervision/set-up within 7 day(s). 2.  Patient will perform lower body dressing with supervision/set-up using AE PRN within 7 day(s). 3.  Patient will perform bathing with supervision/set-up within 7 day(s). 4.  Patient will perform toilet transfers with supervision/set-up within 7 day(s). 5.  Patient will perform all aspects of toileting with supervision/set-up using AE PRN within 7 day(s). 6.  Patient will participate in upper extremity therapeutic exercise/activities with supervision/set-up for 10 minutes within 7 day(s). Met inconsistently- continue for consistency  7. Patient will utilize energy conservation, fall prevention and pain management techniques during functional activities with verbal cues within 7 day(s). Outcome: Progressing Towards Goal   OCCUPATIONAL THERAPY TREATMENT  Patient: Naye Greco (10 y.o. male)  Date: 7/26/2019  Diagnosis: Sepsis ([facility] [A41.9] <principal problem not specified>       Precautions: Fall, Skin  Chart, occupational therapy assessment, plan of care, and goals were reviewed. ASSESSMENT:  Patient received in bed willing to work with therapy; reports he has completed his 30 minute AROM HEP with red theraband. Staff reports patient still using bedpan and requiring assist with urinal. Due to gravity, unless he is standing with safe balance he will need assist with urinal placement due to girth etc. He has transferred to [facility] yesterday Min A with RW with it placed right beside bed; he states diarrhea is limiting factor of why he \"can't get there in time after I ring the call bell. \" Encouraged to use [address] as much as is possible.  Patient encouraged to get up to chair for at least 1 meal per day. With acknowledgement that bariatric chair is low and patient may need Latluise Dumont assist back to bed due to chair height/fatigue. Patient will benefit from inpatient rehab to return to mod I as he does live alone. Progression toward goals:  ?       Improving appropriately and progressing toward goals  ? Improving slowly and progressing toward goals  ? Not making progress toward goals and plan of care will be adjusted     PLAN:  Patient continues to benefit from skilled intervention to address the above impairments. Continue treatment per established plan of care. Discharge Recommendations:  Inpatient Rehab  Further Equipment Recommendations for Discharge:  TBA      SUBJECTIVE:   Patient stated Mandy Otoole will do whatever you suggest.    OBJECTIVE DATA SUMMARY:   Cognitive/Behavioral Status:  Neurologic State: Alert  Orientation Level: Oriented X4  Cognition: Follows commands  Perception: Appears intact  Perseveration: No perseveration noted  Safety/Judgement: Fall prevention; Insight into deficits    Functional Mobility and Transfers for ADLs:  Bed Mobility:  Rolling: Supervision;Setup                 Balance:  Sitting: Intact    ADL Intervention:  Feeding  Feeding Assistance: Independent    Grooming  Grooming Assistance: Set-up              Upper Body Dressing Assistance  Dressing Assistance: Set-up    Lower Body Dressing Assistance  Dressing Assistance: Moderate assistance;Maximum assistance(recommend use of AE/bariatric size for energy conservation)    Toileting  Toileting Assistance: Maximum assistance(improvement with [address])  Cues: (reviewed options to increase toileting skills)    Cognitive Retraining  Attention to Task: Single task  Maintains Attention For (Time): Greater than 10 minutes  Following Commands: Follows one step commands/directions; Follows two step commands/directions  Safety/Judgement: Fall prevention; Insight into deficits      Pain:  Pain Scale 1: Numeric (0 - 10)  Pain Intensity 1: 0              Activity Tolerance:   Improving; encourage at least 1 meal a day in chair  Please refer to the flowsheet for vital signs taken during this treatment. After treatment:   ? Patient left in no apparent distress sitting up in chair  ? Patient left in no apparent distress in bed  ? Call bell left within reach  ? Nursing notified  ? Caregiver present  ?  Bed alarm activated    COMMUNICATION/COLLABORATION:   The patients plan of care was discussed with: Physical Therapist and Registered Nurse    Marko Gonzales OTR/L  Time Calculation: 24 mins

## 2019-07-26 NOTE — DISCHARGE INSTRUCTIONS
Discharge Summary    PATIENT ID: Esa Hdz  MRN: [id number]   YOB: 1955    DATE OF ADMISSION: 7/12/2019 11:35 PM    DATE OF DISCHARGE: 7/26/2019  PRIMARY CARE PROVIDER: Leeanna Stark MD   ATTENDING PHYSICIAN: Bruce Payne MD  DISCHARGING PROVIDER: Brad Padilla NP. To contact this individual call [phone] and ask the  to page. If unavailable ask to be transferred the Adult Hospitalist Department.     CONSULTATIONS: IP CONSULT TO NEPHROLOGY  IP CONSULT TO INFECTIOUS DISEASES     ADMITTING [address] [place] [address] COURSE:   Pt presented from [facility] with complaints of redness, swelling, and pain of both the legs, L>R.       Pmhx arthritis, asthma, type 2 diabetes mellitus, hypertension, morbid obesity, sleep apnea     DISCHARGE DIAGNOSES / PLAN:       Sepsis 2/2 left lower extremity cellulitis: improved  - hx chronic venous stasis changes with edema  - lactic acidosis: resolved  - leukocytosis: resolved  - blood cx: no growth (final);  wound cx was not sent on admission  - discontinued vanc 7/16;  changed IV zosyn to po augmentin 7/22, however, Dr. Mayo Coreas changed back to Zosyn 7/23 as he felt as though his LLE is not looking much better. - ultrasound of the LLE: Echogenic skin thickening at the site of the first more cephalad wound in the LLE anteriorly, without a discrete collection. Hypoechoic phlegmon suggested beneath the skin at the site of the more caudad wound in the left lower leg, without a discrete drainable abscess or fluid collection at this time  - continue wound care.    - 7/26: insurance authorization completed and pt to be discharged to [facility]  - discussed case with ID: agrees with d/c and plan for 4 more days of augmentin     Acute metabolic encephalopathy: resolved     CYNDI: improved/resolved   - nephrology on board  - cr unknown baseline  - nephro ok with lasix and lisinopril   - weekly labs     SOB on exertion: multifactorial 7/20 - improving   - CXR: Stable to slightly increased diffuse interstitial prominence. Correlate for developing interstitial edema  - continue with daily lasix     Hx DM:   - A1c 6.7, Blood glucose 122-146  - Resume Metformin on d/c and check blood glucose BID     Hx HTN: c/w amlodipine, lisinopril, lasix   - BP stable     Hx JOSUÉ: continue with CPAP during sleep and naps     Morbid Obesity: BMI is 62.45 kg/m²  - weight loss recommended, mobility a factor   - pt was taking Contrave under PCP direction, consider to resume this once acute illness is resolved         FOLLOW UP APPOINTMENTS:             Follow-up Information      Follow up With Specialties Details Why Contact Info     Corwin Luna MD Family Practice In 1 week or facility provider [address] [place] [address]  [address] [place] [address]        Cheyanne Buitrago MD Nephrology     [address]  [phone]        Dermatology     after rehab is complete            ADDITIONAL CARE RECOMMENDATIONS:   Weekly labs, may fax results to Dr. Ahmet Zhou ([phone])     Will have 4 more days of oral antibiotics (starting 7/27/19)     CPAP nightly and during naps     Check blood glucose twice daily     DIET: Diabetic Diet     ACTIVITY: PT/OT Eval and Treat     WOUND CARE:   Daily: left lateral thigh: apply very small amount of carrasyn Gel and 1 sheet mepilex transfer     EQUIPMENT needed: as per PT/OT     DISCHARGE MEDICATIONS:        Current Discharge Medication List             START taking these medications     Details   amoxicillin-clavulanate (AUGMENTIN) 875-125 mg per tablet Take 1 Tab by mouth every twelve (12) hours for 4 days. Qty: 8 Tab, Refills: 0       acetaminophen (TYLENOL) 325 mg tablet Take 2 Tabs by mouth every four (4) hours as needed for Pain. Qty: 60 Tab, Refills: 0                 CONTINUE these medications which have CHANGED     Details   furosemide (LASIX) 40 mg tablet Take 1 Tab by mouth daily.   Qty: 30 Tab, Refills: 1                 CONTINUE these medications which have NOT CHANGED     Details   lisinopril (PRINIVIL, ZESTRIL) 10 mg tablet Take 10 mg by mouth daily.       amLODIPine (NORVASC) 5 mg tablet Take 5 mg by mouth daily.       metFORMIN (GLUCOPHAGE) 500 mg tablet Take 500 mg by mouth daily (with breakfast).     potassium chloride SR (KLOR-CON 10) 10 mEq tablet Take 20 mEq by mouth daily.       meloxicam (MOBIC) 15 mg tablet Take 15 mg by mouth daily as needed for Pain.                STOP taking these medications         naltrexone-buPROPion (CONTRAVE) 8-90 mg TbER ER tablet Comments:   Reason for Stopping:                 NOTIFY YOUR PHYSICIAN FOR ANY OF THE FOLLOWING:   Fever over 101 degrees for 24 hours. Chest pain, shortness of breath, fever, chills, nausea, vomiting, diarrhea, change in mentation, falling, weakness, bleeding. Severe pain or pain not relieved by medications. Or, any other signs or symptoms that you may have questions about.     DISPOSITION:    Home With:    OT   PT   HH   RN      xx Long term SNF/Inpatient Rehab     Independent/assisted living     Hospice     Other:      PATIENT CONDITION AT DISCHARGE:   Functional status   xx Poor    xx Deconditioned      Independent       Cognition    xx Lucid      Forgetful      Dementia       Catheters/lines (plus indication)     Cerda      PICC      PEG    xx None       Code status   xx  Full code      DNR       PHYSICAL EXAMINATION AT DISCHARGE:  /75   Pulse 76   Temp 98.6 °F (37 °C)   Resp 18   Ht 5' 8\" (1.727 m)   Wt (!) 176.7 kg (389 lb 8.9 oz)   SpO2 98%   BMI 59.23 kg/m²      Pt in bed, looking forward to going to Inpatient Rehab today. We discussed antibiotic management and have spoken with ID: to have 4 more days of augmentin. Under direction of Dr. Esther Rossi, pt was taking Contrave, a medication in weight loss management.  He indicated before he became ill, he had lst 15 pounds and he was feeling like the medication was helping him with better foods choice, decreased consumption and satiety. Once his is well, he plans on discussing resumption with his PCP.     Constitutional:  No acute distress, cooperative, pleasant    ENT:  Oral MM, oropharynx benign    Resp:  No accessory muscle use and on RA    GI:  Obese, + BM    Musculoskeletal:  No edema, warm, 2+ pulses throughout    Neurologic:  Moves all extremities. AAOx3   Skin:  Dressings dry and intact. Dry flaky scabbed area to LLE. Non draining.                CHRONIC MEDICAL DIAGNOSES:             Problem List as of 7/26/2019 Date Reviewed: 7/26/2019           Codes Class Noted - Resolved     RESOLVED: [facility]eps[facility] ([facility] ICD-10-CM: A41.9  ICD-9-CM: 038.9, 995.91   7/13/2019 - 7/26/2019                Greater than 30 minutes were spent with the patient on counseling and coordination of care     Signed:   Jayleen Ocampo NP  7/26/2019  2:24 PM    Cellulitis: Care Instructions  Your Care Instructions    Cellulitis is a skin infection caused by bacteria, most often strep or staph. It often occurs after a break in the skin from a scrape, cut, bite, or puncture, or after a rash. Cellulitis may be treated without doing tests to find out what caused it. But your doctor may do tests, if needed, to look for a specific bacteria, like methicillin-resistant Staphylococcus aureus (MRSA). The doctor has checked you carefully, but problems can develop later. If you notice any problems or new symptoms, get medical treatment right away. Follow-up care is a key part of your treatment and safety. Be sure to make and go to all appointments, and call your doctor if you are having problems. It's also a good idea to know your test results and keep a list of the medicines you take. How can you care for yourself at home? · Take your antibiotics as directed. Do not stop taking them just because you feel better. You need to take the full course of antibiotics.   · Prop up the infected area on pillows to reduce pain and swelling. Try to keep the area above the level of your heart as often as you can. · If your doctor told you how to care for your wound, follow your doctor's instructions. If you did not get instructions, follow this general advice:  ? Wash the wound with clean water 2 times a day. Don't use hydrogen peroxide or alcohol, which can slow healing. ? You may cover the wound with a thin layer of petroleum jelly, such as Vaseline, and a nonstick bandage. ? Apply more petroleum jelly and replace the bandage as needed. · Be safe with medicines. Take pain medicines exactly as directed. ? If the doctor gave you a prescription medicine for pain, take it as prescribed. ? If you are not taking a prescription pain medicine, ask your doctor if you can take an over-the-counter medicine. To prevent cellulitis in the future  · Try to prevent cuts, scrapes, or other injuries to your skin. Cellulitis most often occurs where there is a break in the skin. · If you get a scrape, cut, mild burn, or bite, wash the wound with clean water as soon as you can to help avoid infection. Don't use hydrogen peroxide or alcohol, which can slow healing. · If you have swelling in your legs (edema), support stockings and good skin care may help prevent leg sores and cellulitis. · Take care of your feet, especially if you have diabetes or other conditions that increase the risk of infection. Wear shoes and socks. Do not go barefoot. If you have athlete's foot or other skin problems on your feet, talk to your doctor about how to treat them. When should you call for help? Call your doctor now or seek immediate medical care if:    · You have signs that your infection is getting worse, such as:  ? Increased pain, swelling, warmth, or redness. ? Red streaks leading from the area. ? Pus draining from the area.   ? A fever.     · You get a rash.    Watch closely for changes in your health, and be sure to contact your doctor if:    · You do not get better as expected. Where can you learn more? Go to http://chi-jose.info/. Aliya Ck in the search box to learn more about \"Cellulitis: Care Instructions. \"  Current as of: April 1, 2019  Content Version: 12.1  © 2901-5673 [employer]. Care instructions adapted under license by [employer] (which disclaims liability or warranty for this information). If you have questions about a medical condition or this instruction, always ask your healthcare professional. [facility] [address] any warranty or liability for your use of this information. Please bring this form with you to show your primary care provider at your follow-up appointment. Primary care provider:  Dr. Jennifer Rose MD    Discharging provider:  Jairo Reeves MD    You have been admitted to the hospital with the following diagnoses:  · Sepsis [facility]) [A41.9]    FOLLOW-UP CARE RECOMMENDATIONS:    APPOINTMENTS:  · Follow-up with primary care provider, Dr. Jennifer Rose MD  -  Please call [phone] shortly after discharge to set up an appointment to be seen in 1 week   · nephrology    FOLLOW-UP TESTS recommended: cbc, bmp in 3 days    PENDING TEST RESULTS:  At the time of your discharge the following test results are still pending: none  Please make sure you review these results with your outpatient follow-up provider(s). SYMPTOMS to watch for: chest pain, shortness of breath, fever, chills, nausea, vomiting, diarrhea, change in mentation, falling, weakness, bleeding. DIET/what to eat:  Low fat, Low cholesterol    ACTIVITY:  Activity as tolerated    WOUND CARE: ***      What to do if new or unexpected symptoms occur? If you experience any of the above symptoms (or should other concerns or questions arise after discharge) please call your primary care physician. Return to the emergency room if you cannot get hold of your doctor.     · It is very important that you keep your follow-up appointment(s). · Please bring discharge papers, medication list (and/or medication bottles) to your follow-up appointments for review by your outpatient provider(s). · Please check the list of medications and be sure it includes every medication (even non-prescription medications) that your provider wants you to take. · It is important that you take the medication exactly as they are prescribed. · Keep your medication in the bottles provided by the pharmacist and keep a list of the medication names, dosages, and times to be taken in your wallet. · Do not take other medications without consulting your doctor. · If you have any questions about your medications or other instructions, please talk to your nurse or care provider before you leave the hospital.    I understand that if any problems occur once I am at home I am to contact my physician. These instructions were explained to me and I had the opportunity to ask questions. [employer] Activation    Thank you for requesting access to [employer]. Please follow the instructions below to securely access and download your online medical record. [employer] allows you to send messages to your doctor, view your test results, renew your prescriptions, schedule appointments, and more. How Do I Sign Up? 1. In your internet browser, go to [email or web address]. 2. Click on the First Time User? Click Here link in the Sign In box. You will see the New Member Sign Up page. 3. Enter your [employer] Access Code exactly as it appears below. You will not need to use this code after youve completed the sign-up process. If you do not sign up before the expiration date, you must request a new code. [employer] Access Code: [email or web address]  Expires: 2019  2:10 PM (This is the date your [employer] access code will )    4.  Enter the last four digits of your Social Security Number (xxxx) and Date of Birth (mm/dd/yyyy) as indicated and click Submit. You will be taken to the next sign-up page. 5. Create a [employer] ID. This will be your [employer] login ID and cannot be changed, so think of one that is secure and easy to remember. 6. Create a [employer] password. You can change your password at any time. 7. Enter your Password Reset Question and Answer. This can be used at a later time if you forget your password. 8. Enter your e-mail address. You will receive e-mail notification when new information is available in [address]. 9. Click Sign Up. You can now view and download portions of your medical record. 10. Click the Download Summary menu link to download a portable copy of your medical information. Additional Information    If you have questions, please visit the Frequently Asked Questions section of the [employer] website at [email or web address] Remember, [employer] is NOT to be used for urgent needs. For medical emergencies, dial 911.

## 2019-07-26 NOTE — PROGRESS NOTES
7/26/19; 08:45 -   CM contacted [facility]: [phone]) and left VM to check on status of patient's IPR auth. CRM: Alfredo Mayers MPH, [facility]; Z: [phone]    09:30 -   Per Salty Allen, ins Valene Flood is still pending. CRM: Alfredo Mayers MPH, [facility]; Z: [phone]    13:25 -   CM received call that patient's ins Valene Flood is in place. CM met with patient with brother at bedside to discuss transfer. Patient will require medical transport. Patient currently has IV ABX hung. CM submitted transport request to [facility] via [employer] with 17:30 requested time. Patient, family, attending NP, and nursing aware of the above. Accepting Facility: [facility]  Accepting Provider: Dr. Ramandeep Baldwin  Accepting Liaison: Darlene Gonzalez, [phone]  Report: [phone]  Fax: [phone]    CM to complete: initiation of EMTALA, PCS, Face Sheet, H&P, SBAR, and KARDEX.   Packet on hard chart    NURSING TO COMPLETE: finalization of EMTALA, MAR, DISCHARGE, FAX MAR AND DISCHARGE TO F: [phone] BEFORE CALLING REPORT TO: [phone]  CRM: Alfredo Mayers MPH, [address] [place] [address]; Z: [phone]

## 2019-07-26 NOTE — PROGRESS NOTES
Infectious Diseases Progress Note    Antibiotic [place]:  Vancomycin  --   Zosyn   --   Augmentin  --   Zosyn   -- present      Subjective:     He was able to walk to the door. Objective:     Vitals:   Visit Vitals  /70   Pulse 74   Temp 98.6 °F (37 °C)   Resp 19   Ht 5' 8\" (1.727 m)   Wt (!) 180.7 kg (398 lb 5.9 oz)   SpO2 97%   BMI 60.57 kg/m²        Tmax:  Temp (24hrs), Av.8 °F (37.1 °C), Min:98.6 °F (37 °C), Max:99 °F (37.2 °C)      Exam:  General appearance: alert, no distress  Lungs: clear to auscultation bilaterally  Heart: regular rate and rhythm  Abdomen: obese  LLE: The erythema is better  Skin: scaly areas both legs (eczema or psoriasis ?)    IV Lines: peripheral    Labs:    Recent Labs     19  0331 19  1210   BUN 14 14   CREA 1.31* 1.24       Assessment:     1. Acute cellulitis of the LLE -- The clinical history is c/w Streptococcal cellulitis but obesity, diabetes, and venous stasis dermatitis increase the risk of other pathogens also. He has no history of MRSA. He is slowly improving      2. NIDDM     3. HTN     4. JOSUÉ     5. CYNDI -- baseline creatinine is not known     6. Hx of presumed DJD    Plan:     1.  Te Bautista MD

## 2019-07-31 NOTE — ADT AUTH CERT NOTES
Sepsis and Other Febrile Illness, without Focal Infection - Care Day 13 (7/25/2019) by Serafin Martinez RN  
 
   
Review Entered Review Status 7/26/2019 12:27 Completed  
   
Criteria Review Care Day: 13 Care Date: 7/25/2019 Level of Care: Inpatient Floor Guideline Day 4 Level Of Care (X) Floor to discharge[I] 7/26/2019 12:27:06 EDT by Monica Wei [facility] Clinical Status   
(X) * Hemodynamic stability 7/26/2019 12:27:06 EDT by Kimberly Herman   
  Vitals: 98.6, HR 78, RR 19, /72, 97% on RA   
(X) * Fever absent or acceptable for next level of care 7/26/2019 12:27:06 EDT by Kimberly Herman   
  afebrile 98.6   
(X) * Hypoxemia absent 7/26/2019 12:27:06 EDT by Kimberly Hermna   
  98% on RA   
(X) * Tachypnea absent 7/26/2019 12:27:06 EDT by Kimberly Herman   
  RR 18, 18, 19   
(X) * Cultures negative or infection identified and under adequate treatment 7/26/2019 12:27:06 EDT by Kimberly Herman   
  no growth x 5 days   
(X) * Mental status at baseline 7/26/2019 12:27:06 EDT by Monica Wei AA&O x 3   
(X) * Metabolic derangement (eg, dehydration, acidosis) absent   
(X) * End-organ dysfunction (eg, myocardial ischemia, renal failure) absent   
( ) * Discharge plans and education understood Activity   
(X) * Ambulatory 7/26/2019 12:27:06 EDT by Kimberly Herman   
  ambulate with assist   
Routes   
(X) * Oral hydration, medications[J] 7/26/2019 12:27:06 EDT by Kimberly Herman   
  Norvasc 5mg PO daily Lasix 40mg PO daily Heparin 5,000units SC q8h Humalog SC 4x daily SSI Lisinopril 10mg PO daily Klor-Con 20meq PO daily Augementin 875/125mg PO q12h Zosyn 3.375g IV q8h   
(X) Usual diet 7/26/2019 12:27:06 EDT by Kimberly Herman   
  diabetic carb regular diet Medications ( ) * Antimicrobial treatment not necessary or treatment at next level of care arranged[D] 7/26/2019 12:27:06 EDT by Afia Mitchell Subject: Additional Clinical Information 7/25/19 IM assessment/plan:   
Pt lying in bed getting ready to nap - was placing CPAP on.   No new problems overnight. Discussed results from LLE ultrasound and continued discussion with ID about abx therapy. Sepsis 2/2 left lower extremity cellulitis: improved   
- hx chronic venous stasis changes with edema   
- lactic acidosis on POA: resolved   
- leucocytosis: resolved - blood cx: no growth (final);   wound cx was not sent on admission   
- discontinued vanc 7/16 and changed  IV zosyn to po augmentin 7/22, however, Dr. Brenden Zafar changed back to Zosyn 7/23 as he felt as though his LLE is not looking much better. Discussed case with Dr. Brenden Zafar 7/25.   
- discussed care with ID. Left lower leg wound appears to look worse   
- ultrasound of the LLE: Echogenic skin thickening at the site of the first more cephalad wound in the LLE anteriorly, without a discrete collection. Hypoechoic phlegmon suggested beneath the skin at the site of the more caudad wound in the left lower leg, without a discrete drainable abscess or fluid collection at this time   
- continue wound care. - once Catrachita Khan goes through, will discuss abx mgmt once again with ID     
     
Acute metabolic encephalopathy: resolved   
     
CYNDI: improved/resolved   
- nephrology on board   
- cr unknown baseline   
- nephro ok with lasix and lisinopril   
- monitor this as pt is back on zosyn   
     
SOB on exertion: multifactorial  7/20 - improving     
- CXR: Stable to slightly increased diffuse interstitial prominence. Correlate for developing interstitial edema   
     
Hx DM:   
- A1c 6.7, Blood glucose 123-151 - Metformin on hold. Continue with SSI   
     
Hx HTN: c/w amlodipine, lisinopril, lasix   
     
Hx JOSUÉ: has CPAP at bedside   
     
Morbid Obesity: Body mass index is 62.45 kg/m ² - weight loss recommended, mobility a factor   
     
 Code status: Full DVT prophylaxis: Heparin Care Plan discussed with: patient Disposition: Inpatient Rehab when insurance authorization obtained and pt stable for d/c   
  
  
  
PT progress note:   
Arrived to perform PT treatment, OT at EOB with patient after x2 unsuccessful stands. Pt required cotreat and assist x2 for safety and confidence to perform CGA-min A for mobility to stand and weight shift with side steps. Pt able to transfer to [facility] with sidestepping along bed and then turning, and then pushing up off the [facility] to stand and transfer back to bed. Pt demos CGA to supervision for bed mobility including bridging and rolling. Great improvement today and patient working hard throughout. Patient continues to be great candidate for IPR and can tolerate three hours. Highly motivated. Recommend continued work on [facility] transfer for safety and confidence/strengthening. Patient has continued to completed LE bed exercises Nephrology assessment/plan:No new Sx   
Continues to make steady progress No N/V/CP/SOB   
- resolved and stable   
- continue losartan and lasix   
- labs daily   
     
CKD - ?? baseline   
     
Chronic venous insufficiency/dermatitis/wounds   
-per Dr Avila Arndt   
     
  
HTN, relative hypotension ID assessment/plan:   
Patient was able to walk to the door, he is slowly improving Plan: continue Zosyn Labs:   
 
Glucose: 119 (H) BUN: 14   
 
Creatinine: 1.31 (H) BUN/Creatinine ratio: 11 (L) Calcium: 8.8 GFR est non-AA: 56 (L)   
 
GFR est AA: >60 Plan: activity as tolerated with assist, ambulate with assist, SCDs, bladder checks, daily weights, fall precautions, I&O's, spot check oximetry, wound care daily * Milestone  
   
Sepsis and Other Febrile Illness, without Focal Infection - Care Day 12 (7/24/2019) by Yury Dolan RN  
 
   
Review Entered Review Status 7/26/2019 12:11 Completed  
   
 Criteria Review Care Day: 12 Care Date: 7/24/2019 Level of Care: Inpatient Floor Guideline Day 4 Level Of Care (X) Floor to discharge[I] 7/26/2019 12:11:35 EDT by [facility] Clinical Status   
(X) * Hemodynamic stability 7/26/2019 12:11:35 EDT by Aleah Carter   
  Vitals: 99.0, HR 76, RR 20, /73, 94% on RA   
(X) * Fever absent or acceptable for next level of care 7/26/2019 12:11:35 EDT by Aleah Carter   
  afebrile 99.0   
(X) * Hypoxemia absent 7/26/2019 12:11:35 EDT by Aleah Carter   
  94% on RA   
( ) * Tachypnea absent 7/26/2019 12:11:35 EDT by Aleah Carter   
  22, 20, 20 RR   
(X) * Cultures negative or infection identified and under adequate treatment 7/26/2019 12:11:35 EDT by Aleah Carter   
  no growth after 5 days   
(X) * Mental status at baseline   
(X) * Metabolic derangement (eg, dehydration, acidosis) absent   
(X) * End-organ dysfunction (eg, myocardial ischemia, renal failure) absent   
( ) * Discharge plans and education understood Activity   
(X) * Ambulatory 7/26/2019 12:11:35 EDT by Aleah Carter   
  ambulate with assist   
Routes   
(X) * Oral hydration, medications[J] 7/26/2019 12:11:35 EDT by Aleah Carter   
  Tylenol 650mg PO q4h PRN x 1 Norvasc 5mg PO daily Lasix 40mg PO daily Heparin 5,000units SC q8h Humalog SC 4x daily SSI Lisinopril 10mg PO daily Klor-Con 20meq PO daily Augementin 875/125mg PO q12h Zosyn 3.375g IV q8h   
(X) Usual diet 7/26/2019 12:11:35 EDT by Aleah Carter   
  diabetic carb regular diet Medications ( ) * Antimicrobial treatment not necessary or treatment at next level of care arranged[D] 7/26/2019 12:11:35 EDT by Aleah Carter Subject: Additional Clinical Information 7/24/19 Nephrology assessment/plan:   
Noted plans to continue IV abx He has no new Sx No N/V/CP/SOB CYNDI - resolved. No labs in 2 days -- ordered now   
- continue losartan and lasix - watch renal function w/ IV zosyn   
     
CKD - ?? baseline   
     
Chronic venous insufficiency/dermatitis/wounds   
-per Dr Devan Oliver   
     
HTN, relative hypotension IM assessment/plan:   
Pt lying in bed, says he feels some discomfort in his R hip and upper leg at end of day, mostly at night time. Pt is waiting on Rehab approval and apparently this process started today. Sepsis 2/2 left lower extremity cellulitis: improved   
- hx chronic venous stasis changes with edema   
- lactic acidosis on POA: resolved   
- leucocytosis: resolved - blood cx: no growth (final);   wound cx was not sent on admission   
- discontinued vanc 7/16 and changed  IV zosyn to po augmentin 7/22, however, Dr. Devan Oliver changed back to Zosyn last pm as he feels as though his LLE is not looking much better. - wound care to continue   
- discussed care with ID. Left lower leg wound appears to look worse - the lower wound. Possible area of fluid collection which is more tender than rest of wound area. Some serous drainage and no pus with palpation. Will get ultrasound of the LLE to eval for possible collection that then may be surgically I&D'd   
     
Acute metabolic encephalopathy: resolved   
     
CYNDI: improved/resolved   
- nephrology on board   
- cr unknown baseline   
- nephro ok with lasix and lisinopril   
- monitor this as pt is back on zosyn   
     
SOB on exertion: multifactorial  7/20 - improving     
- CXR: Stable to slightly increased diffuse interstitial prominence. Correlate for developing interstitial edema   
     
Hx DM:   
- A1c 6.7, Blood glucose 110-169 - Metformin on hold. Continue with SSI   
     
Hx HTN: c/w amlodipine, lisinopril, lasix   
     
Hx OJSUÉ: has CPAP at bedside   
     
Morbid Obesity: Body mass index is 62.45 kg/m ² - weight loss recommended   
     
Code status: Full DVT prophylaxis: Heparin Care Plan discussed with: patient, nurse,  and ID Disposition: Inpatient Rehab when insurance authorization obtained and pt stable for d/c   
  
  
  
PT progress note:   
patient presents with CGA level overall for bed mobility.   Patient has good sitting balance.   Stood with CGA of 2 x 2.   The first time he was up on his feet for a minute and side stepped 4 feet to the [facility].   The second time he just stood and stayed up for 1:40.   Cooperative and motivated.   He moves well but requires assist of 2 for mobility due to his girth for safety.   He sat total EOB for 20 minutes and was able to do some ex.   On returning to supine we put the bed in the chair position and he was able to do LAQ and ankle pumps. Labs:   
Glucose: 137 (H) BUN: 14   
Creatinine: 1.24   
BUN/Creatinine ratio: 11 (L) Calcium: 9.2 Phosphorus: 3.4 GFR est non-AA: 59 (L) GFR est AA: >60 Albumin: 2.3 (L) Plan: activity as tolerated with assist, ambulate with assist, SCDs, bladder checks, daily weights, fall precautions, I&O's, spot check oximetry, wound care daily * Milestone  
   
Sepsis and Other Febrile Illness, without Focal Infection - Care Day 11 (7/23/2019) by Felicity Pan RN  
 
   
Review Entered Review Status 7/26/2019 12:03 Completed  
   
Criteria Review Care Day: 11 Care Date: 7/23/2019 Level of Care: Inpatient Floor Guideline Day 4 Level Of Care (X) Floor to discharge[I] 7/26/2019 12:03:24 EDT by [facility] Clinical Status   
(X) * Hemodynamic stability 7/26/2019 12:03:24 EDT by Aiyana Khoury   
  Vitals: 98.1, HR 82, RR 20, /83, 93% on RA   
(X) * Fever absent or acceptable for next level of care 7/26/2019 12:03:24 EDT by Aiyana Khoury   
  afebrile 98.1   
(X) * Hypoxemia absent 7/26/2019 12:03:24 EDT by Aiyana Khoury   
  93% on RA   
( ) * Tachypnea absent 7/26/2019 12:03:24 EDT by Reynaldo Do   
  RR 20   
(X) * Cultures negative or infection identified and under adequate treatment 7/26/2019 12:03:24 EDT by Reynaldo Do   
  no growth after 5 days   
(X) * Mental status at baseline 7/26/2019 12:03:24 EDT by Reynaldo Do   
  A&O x 3   
(X) * Metabolic derangement (eg, dehydration, acidosis) absent   
(X) * End-organ dysfunction (eg, myocardial ischemia, renal failure) absent   
( ) * Discharge plans and education understood Activity   
(X) * Ambulatory 7/26/2019 12:03:24 EDT by Reynaldo Do   
  ambulate with assist   
Routes   
(X) * Oral hydration, medications[J] 7/26/2019 12:03:24 EDT by Reynaldo Do   
  Tylenol 650mg PO q4h PRN x 3 Norvasc 5mg PO daily Lasix 40mg PO daily Heparin 5,000units SC q8h Humalog SC 4x daily SSI Lisinopril 10mg PO daily Klor-Con 20meq PO daily Augementin 875/125mg PO q12h (X) Usual diet 7/26/2019 12:03:24 EDT by Reynaldo Do   
  diabetic carb regular diet Medications ( ) * Antimicrobial treatment not necessary or treatment at next level of care arranged[D] 7/26/2019 12:03:24 EDT by Reynaldo Do Subject: Additional Clinical Information 7/23/19 Nephrology note: Edema continues to improve No labs today No N/V/CP/SOB CYNDI   
- resolved and stable   
- continue losartan and lasix on d/c   
- No out pt f/u needed   
     
CKD - ?? baseline   
     
Chronic venous insufficiency/dermatitis/wounds   
-per Dr Pardeep Degroot   
     
HTN, relative hypotension IM assessment/plan:   
Pt lying in bed, no new complaints - awaiting Rehab approvalSepsis 2/2 left lower extremity cellulitis: improved   
- hx chronic venous stasis changes with edema   
- lactic acidosis on POA: resolved   
- leucocytosis: resolved - blood cx: no growth (final);  wound cx was not sent on admission   
- discontinued vanco 7/16 and changed  IV zosyn to po augmentin 7/22 - wound care - appreciate ID input   
     
Acute metabolic encephalopathy: resolved   
     
CYNDI: improved/resolved   
- nephrology on board   
- cr unknown baseline   
- nephro ok with lasix and lisinopril   
     
SOB on exertion: multifactorial  7/20 - improving     
- obesity, did not participate with PT in last 3 days - CXR showed: Stable to slightly increased diffuse interstitial prominence. Correlate for developing interstitial edema   
     
Hx DM:   
- A1c 6.7, Blood glucose 115-138 - Metformin on hold. Continue with SSI   
     
Hx HTN: c/w amlodipine, lisinopril, lasix   
     
Hx JOSUÉ: has CPAP at bedside   
     
Morbid Obesity: Body mass index is 62.45 kg/m ² - weight loss recommended   
     
Code status: Full DVT prophylaxis: Heparin Care Plan discussed with: patient Disposition: Inpatient Rehab when insurance authorization obtained PT progress note:Pt demos increased performance today, stating that he has done his homework of hooklying and bridging, AP and heel slides. Pt eager to participate as he feels like he is starting to make progress. Pt performed supine to sit without assistance CGA for safety, improved WB on feet and cues for rocking and patient able to stand with min A-CGA x3. Pt tolerating standing well with decreased assist for balance and able to take sidesteps to hob for readjustment in the bed. Pt progressing well and would require the inpatient rehab setting due to his young age, good progress, increased weight bearing and will improve and tolerate 3h of therapy. Pt eager to have a short intense stay and return to PLOF Patient's progression toward goals since last assessment: Pt demos improvement in participation, weight bearing, and muscle recruitment ID assessment/plan: He was able to stand but could only step side to side due to pain General appearance: alert, no distressLungs: clear to auscultation bilaterally Heart: regular rate and rhythm Abdomen: obese LLE: The erythema is better in the anteromedial leg but remains prominent in the anterolateral leg> There is a 1 cm ulcer in the proximal anterolateral leg. A few cm distal to this is a tender raised macerated area about 2x1 cm that may open and is tender Skin: scaly areas both legs (eczema or psoriasis ?) ID plan:1. Continue Zosyn   
     
2. Watch areas on anterolateral aspect to see if debridement becomes necessary Labs: glucose 138 Plan: activity as tolerated with assist, ambulate with assist, SCDs, bladder checks, daily weights, fall precautions, I&O's, spot check oximetry, wound care daily * Milestone  
   
Sepsis and Other Febrile Illness, without Focal Infection - Care Day 10 (7/22/2019) by Marylou Taylor RN  
 
   
Review Entered Review Status 7/26/2019 11:41 Completed  
   
Criteria Review Care Day: 10 Care Date: 7/22/2019 Level of Care: Inpatient Floor Guideline Day 4 Level Of Care (X) Floor to discharge[I] 7/26/2019 11:41:15 EDT by [facility] Clinical Status   
(X) * Hemodynamic stability 7/26/2019 11:41:15 EDT by Aleah Carter   
  Vitals: 97.3, HR 72, RR 20, /71, 94% on RA   
(X) * Fever absent or acceptable for next level of care 7/26/2019 11:41:15 EDT by Aleah Carter   
  afebrile 97.3   
(X) * Hypoxemia absent 7/26/2019 11:41:15 EDT by Aleah Carter   
  94% on RA   
( ) * Tachypnea absent 7/26/2019 11:41:15 EDT by Aleah Carter   
  RR 20   
(X) * Cultures negative or infection identified and under adequate treatment   
(X) * Mental status at baseline 7/26/2019 11:41:15 EDT by Aleah Carter   
  AA&O x 3   
(X) * Metabolic derangement (eg, dehydration, acidosis) absent   
(X) * End-organ dysfunction (eg, myocardial ischemia, renal failure) absent   
( ) * Discharge plans and education understood Activity (X) * Ambulatory 7/26/2019 11:41:15 EDT by Donny Chase   
  ambulate with assist   
Routes   
(X) * Oral hydration, medications[J] 7/26/2019 11:41:15 EDT by Donny Chase   
  Tylenol 650mg PO q4h PRN x 1 Norvasc 5mg PO daily Lasix 40mg PO daily Heparin 5,000units SC q8h Humalog SC 4x daily SSI Lisinopril 10mg PO daily Klor-Con 20meq PO daily Augementin 875/125mg PO q12h Zosyn 3.375g IV q8h   
(X) Usual diet 7/26/2019 11:41:15 EDT by Donny Chase   
  diabetic carb regular diet Medications ( ) * Antimicrobial treatment not necessary or treatment at next level of care arranged[D] 7/26/2019 11:41:15 EDT by Donny Chase Subject: Additional Clinical Information 7/22/19 Nephrology note:Doing well Good UOP after dose of IV lasix Edema and erythema in legs much improved No N/V/CP/SOB CYNDI   
- resolved and stable   
- continue losartan and lasix - No out pt f/u needed   
     
CKD - ?? baseline   
     
Chronic venous insufficiency/dermatitis/wounds   
-per Dr Sanders Ask   
     
HTN, relative hypotension IM assessment/plan: He feels better today. Leg erythema and edema improving. Denied fever. He agreed to rehab. Extr: bilateral LE swelling - decreased. Left leg mild erythematous Assessment/Plan:   
Sepsis 2/2 left lower extremity cellulitis - improved   
- hx chronic venous stasis changes with edema -   lactic acidosis on POA- resolved   
- leucocytosis 23.3 poa improving 13   
- blood cx: NGTD   
- wound cx was not sent on admission   
- dced vanco 7/16.   
- wound care - appreciate ID input   
- changed  IV zosyn to po augmentin 7/22   
     
Acute metabolic encephalopathy-   resolved   
     
CYNDI - improved   
- nephrology on board   
- cr unknown baseline   
- nephro ok with lasix and lisinopril   
     
SOB on exertion - multifactorial 7/20 - improving - obesity, did not participate with PT in last 3 days - CXR showed : Stable to slightly increased diffuse interstitial prominence. Correlate for developing interstitial edema   
- one addiitional dose of IV lasix 7/21   
     
DM - A1c 6.7. C/w iss high sensitivity . Metformin on hold HTN - c/w amlodipine, lisinopril JOSUÉ on CPAP   
     
Morbid Obesity Body mass index is 62.45 kg/m ². weight loss recommended   
     
Code status: Full DVT prophylaxis: heparin Disposition: TBD. Inpatient rehab. CM working on [facility]. Waiting for insurance auth. Labs:   
Labs: RBC: 3.21 (L) HGB: 9.4 (L) HCT: 30.3 (L) PLATELET: 723 (H) NEUTROPHILS: 67   
 
LYMPHOCYTES: 17 IMMATURE GRANULOCYTES: 1 (H) ABSOLUTE NRBC: 0.00   
 
ABS. NEUTROPHILS: 6.7   
 
ABS. IMM. GRANS.: 0.1 (H)   
 
ABS. MONOCYTES: 1.0 Sodium: 139 Potassium: 4.1 Chloride: 108 Glucose: 107 (H) BUN: 12 Creatinine: 1.24   
 
BUN/Creatinine ratio: 10 (L) Calcium: 8.5 GFR est non-AA: 59 (L) Plan: activity as tolerated with assist, ambulate with assist, SCDs, bladder checks, daily weights, fall precautions, I&O's, spot check oximetry, wound care daily * Milestone  
   
Sepsis and Other Febrile Illness, without Focal Infection - Care Day 9 (7/21/2019) by Natividad Mosher RN  
 
   
Review Entered Review Status 7/24/2019 19:09 Completed  
   
Criteria Review Care Day: 9 Care Date: 7/21/2019 Level of Care: Inpatient Floor Guideline Day 4 Clinical Status   
(X) * Hemodynamic stability 7/24/2019 19:09:21 EDT by Chavo Olivera   
  80, 115/58   
(X) * Fever absent or acceptable for next level of care 7/24/2019 19:09:21 EDT by Chavo Olivera   
  98.9   
(X) * Hypoxemia absent 7/24/2019 19:09:21 EDT by Chavo Olivera   
  95%, RA   
( ) * Tachypnea absent   
(X) * Cultures negative or infection identified and under adequate treatment 7/24/2019 19:09:21 EDT by Chavo Olivera   
  blood cx: NGTD - wound cx was not sent on admission   
(X) * Mental status at baseline 7/24/2019 19:09:21 EDT by Wero Harrington   
  No AMS noted   
(X) * Metabolic derangement (eg, dehydration, acidosis) absent 7/24/2019 19:09:21 EDT by Wero Harrington   
  CYNDI - improved   
(X) * End-organ dysfunction (eg, myocardial ischemia, renal failure) absent 7/24/2019 19:09:21 EDT by Wero Harrington   
  CYNDI - improved ( ) * Discharge plans and education understood Activity   
(X) * Ambulatory 7/24/2019 19:09:21 EDT by Nasim Ordaz activity with assist, Routes   
(X) * Oral hydration, medications[J] 7/24/2019 19:09:21 EDT by Nasim Ordaz DD, tylenol 650mg Q4h PRN POx1, norvasc 5mg PO QD, lasix 40mg PO QD, lisinopril 10mg PO QD, KCl 20mEq PO QD,   
(X) Usual diet 7/24/2019 19:09:21 EDT by Nasim Ordaz DD Medications ( ) * Antimicrobial treatment not necessary or treatment at next level of care arranged[D] * Milestone Additional Notes 7/21/19:   
IM: CC: Leg cellulitis S: Patient is seen and examined at bedside. Feels ok. Leg erythema and edema improving. Denied fever. He was sob with minimal exertion. Patient is concerned regarding his mobility. Reassured the patient and encouraged to sit in chair.   
 bilateral LE swelling - decreased. Left leg mild erythematous VS: 99, 73, 20 ,94%, 133/84 Additional meds: zosyn 3.375g Q8h IV, heparin 5000 units Q8h SC, SSI,   
XR CHEST PORT: Stable to slightly increased diffuse interstitial prominence. Correlate for   
developing interstitial edema PLAN: Full code Sepsis 2/2 left lower extremity cellulitis - improved   
- hx chronic venous stasis changes with edema -  lactic acidosis on POA- resolved   
- leucocytosis 23.3 poa improving 13   
- blood cx: NGTD   
- wound cx was not sent on admission   
- dced vanco 7/16.   
- wound care - appreciate ID input - c/w IV zosyn , will change to po abx tomorrow. Acute metabolic encephalopathy- Treva Oats CYNDI - improved   
- nephrology on board   
- cr unknown baseline   
- nephro ok with lasix and lisinopril SOB on exertion - multifactorial   
- obesity, did not participate with PT in last 3 days - CXR showed : Stable to slightly increased diffuse interstitial prominence. Correlate for developing interstitial edema   
- one addiitional dose of IV lasix today     
DM - A1c 6.7. C/w iss high sensitivity . Metformin on hold HTN - c/w amlodipine, lisinopril JOSUÉ on CPAP Morbid Obesity Body mass index is 63.49 kg/m². weight loss recommended   
   
   
Sepsis and Other Febrile Illness, without Focal Infection - Care Day 8 (7/20/2019) by Jamal Ch RN  
 
   
Review Entered Review Status 7/22/2019 13:48 Completed  
   
Criteria Review Care Day: 8 Care Date: 7/20/2019 Level of Care: Inpatient Floor Guideline Day 4 Level Of Care (X) Floor to discharge[I] 7/22/2019 13:48:06 EDT by [facility] Clinical Status   
(X) * Hemodynamic stability 7/22/2019 13:48:06 EDT by Adrian Cavazos   
  99.2, HR 89, RR 20, /81, 93% on RA   
(X) * Fever absent or acceptable for next level of care 7/22/2019 13:48:06 EDT by Adrian Cavazos   
  afebrile 99.2   
(X) * Hypoxemia absent 7/22/2019 13:48:06 EDT by Adrian Cavazos   
  93% on RA   
(X) * Tachypnea absent 7/22/2019 13:48:06 EDT by Adrian Cavazos   
  RR 18, 20, 20   
(X) * Cultures negative or infection identified and under adequate treatment 7/22/2019 13:48:06 EDT by Adrian Cavazos   
  negative blood cultures   
(X) * Mental status at baseline 7/22/2019 13:48:06 EDT by Adrian Cavazos   
  alert and oriented x 3   
(X) * Metabolic derangement (eg, dehydration, acidosis) absent   
(X) * End-organ dysfunction (eg, myocardial ischemia, renal failure) absent 7/22/2019 13:48:06 EDT by Sandra Nicholson Creatinine 1.24   
 ( ) * Discharge plans and education understood Activity   
(X) * Ambulatory 7/22/2019 13:48:06 EDT by Scott Gates   
  activity as tolerated with assist   
Routes   
(X) * Oral hydration, medications[J] 7/22/2019 13:48:06 EDT by Scott Gates   
  Norvasc 5mg PO daily Lasix 40mg PO daily Heparin 5,000u SC q8h Humalog SC 4x daily SSI Lisinopril 10mg PO daily Klor-Con 20meq PO daily Zosyn 3.375g IV q8h   
(X) Usual diet 7/22/2019 13:48:06 EDT by Scott Gates   
  diabetic carb regular diet Interventions (X) WBC   
7/22/2019 13:48:06 EDT by Scott Gates   
  WBC 13.1 Medications ( ) * Antimicrobial treatment not necessary or treatment at next level of care arranged[D] 7/22/2019 13:48:06 EDT by Scott Gates Subject: Additional Clinical Information 7/20/19 Plan: activity as tolerated with assist, SCD's, bladder checks, daily weights, fall precautions, I&O's,. OT/PT consulted, spot check oximetry 7/22/2019 13:48:06 EDT by Scott Gates Subject: Additional Clinical Information 7/20/19 Nephrology A/P Creatinine stable, continue present care IM A/P Planned for discharged, cancelled discharge when patient became SOB walking to wheelchsir, brother at bedside, concerned regarding patient takting care of himself at home.   Patient has not been participating in PT for the last few days, PT/OT evaluation tomorrow. Labs: WBC 13.1, RBC 3.15, HGB 9.4, HCT 29.4, platelet 371, immature granuloctyes 2, abs neutrophils 9.8, abs imm. grans 0.2, abs. monocytes, chloride 109, glucose 124, * Milestone  
   
Sepsis and Other Febrile Illness, without Focal Infection - Care Day 7 (7/19/2019) by Patrick Guan RN  
 
   
Review Entered Review Status 7/22/2019 13:39 Completed  
   
Criteria Review Care Day: 7 Care Date: 7/19/2019 Level of Care: Inpatient Floor Guideline Day 4 Level Of Care (X) Floor to discharge[I] 7/22/2019 13:39:30 EDT by Alona [facility] Clinical Status   
(X) * Hemodynamic stability 7/22/2019 13:39:30 EDT by Aiyana Khoury   
  99.9, HR 90, RR 18, /69, 94% on RA   
( ) * Fever absent or acceptable for next level of care 7/22/2019 13:39:30 EDT by Aiyana Kohury   
  Temp 99.9   
(X) * Hypoxemia absent 7/22/2019 13:39:30 EDT by Aiyana Khoruy   
  94% on RA   
(X) * Tachypnea absent 7/22/2019 13:39:30 EDT by Aiyana Khoury   
  RR 18, 18, 18   
(X) * Cultures negative or infection identified and under adequate treatment 7/22/2019 13:39:30 EDT by Aiyana Khoury   
  negative   
(X) * Mental status at baseline 7/22/2019 13:39:30 EDT by Aiyana Khoury   
  alert and oriented x 3   
(X) * Metabolic derangement (eg, dehydration, acidosis) absent   
(X) * End-organ dysfunction (eg, myocardial ischemia, renal failure) absent 7/22/2019 13:39:30 EDT by Aiyana Khoury   
  BUN 11, Creatinine 1.34   
( ) * Discharge plans and education understood Activity   
(X) * Ambulatory 7/22/2019 13:39:30 EDT by Aiyana Khoury   
  activity as tolerated with assist   
Routes   
(X) * Oral hydration, medications[J] 7/22/2019 13:39:30 EDT by Aiyana Khoury   
  Tylenol 650mg PO q4h PRN x 2 Norvasc 5mg PO daily Lasix 40mg PO daily Heparin 5,000u SC q8h Humalog SC 4x daily SSI Lisinopril 10mg PO daily Klor-con 20meq PO daily Zosyn 3.375g IV q8h   
(X) Usual diet 7/22/2019 13:39:30 EDT by Aiyana Khoury   
  diabetic carb regular diet Interventions (X) WBC   
7/22/2019 13:39:30 EDT by Aiyana Khoury   
  WBC 14.3 Medications ( ) * Antimicrobial treatment not necessary or treatment at next level of care arranged[D] 7/22/2019 13:39:30 EDT by Aiyana Khoury Subject: Additional Clinical Information 7/19/19 Nephrology plan/assessment:   
  
CYNDI   
- Multifcatorial : Now resolved     
 - Cr stable since restarting Lisinopril and Lasix   
- labs daily   
     
CKD - ?? baseline   
     
Chronic venous insufficiency/dermatitis/wounds   
-per Dr Lian Davila   
     
HTN, relative hypotension   
     
DM   
     
Obesity, JOSUÉ IM assessment/plan:   
  
pt feels ok, leg erythema and edema improving, denied fever. Gen: NAD, morbid obese HEENT: anicteric sclerae, normal conjunctiva, oropharynx clear, MM moist   
Neck: supple, trachea midline, no adenopathy Heart: RRR, no MRG, no JVD, no peripheral edema Lungs: CTA b/l, non-labored respirations Abd: soft, NT, ND, BS+, no organomegaly Extr: bilateral LE swelling. Left leg erythematous and mild tender Skin: dry, no rash Neuro: CN II-XII grossly intact, normal speech, moves all extremities Psych: normal mood, appropriate affect Assessment/Plan:   
Sepsis 2/2 left lower extremity cellulitis - improving slowly   
- hx chronic venous stasis changes with edema -   lactic acidosis on POA- resolved   
- leucocytosis 23.3 poa improving 14.3   
- blood cx: NGTD   
- wound cx was not sent on admission   
- dced vanco 7/16.   
- wound care - appreciate ID input - c/w IV zosyn   
- edema decreased, still erythematous, will continue IV abx for one more day   
     
Acute metabolic encephalopathy resolved   
     
CYNDI   
- mild worsening of cr today, will monitor   
- nephrology on board   
- cr unknown baseline   
- nephro ok with lasix and lisinopril   
     
DM - A1c 6.7. C/w iss high sensitivity . Metformin on hold HTN - c/w amlodipine, lisinopril JOSUÉ on CPAP   
     
Morbid Obesity Body mass index is 55.28 kg/m ². weight loss recommended ID assessment/plan:He still has prominent erythema of the left leg but it looks better LLE: still with severe erythema but it is less intense; bullae posterior calf, medial thigh and proximal lateral thigh look better     
  
 Plan: continue Zosyn - anticipate RX throught the weekend. Labs: WBC 14.3, RBC 3.46, HGB 10.2, HCT 32.3, platelets 339, glucose 113, creatinine 1.34, calcium 8.4 Plan: activity as tolerated with assist, SCD's, bladder checks, daily weights, fall precautions, I&O's,. OT/PT consulted, spot check oximetry * Milestone  
   
Sepsis and Other Febrile Illness, without Focal Infection - Care Day 6 (7/18/2019) by Omsin Rodgers RN  
 
   
Review Entered Review Status 7/22/2019 13:30 Completed  
   
Criteria Review Care Day: 6 Care Date: 7/18/2019 Level of Care: Inpatient Floor Guideline Day 4 Level Of Care (X) Floor to discharge[I] 7/22/2019 13:30:29 EDT by Jo-Ann Magana [facility] [place] Clinical Status   
(X) * Hemodynamic stability 7/22/2019 13:30:29 EDT by Ashley Bird   
  99.5, HR 79, RR 24, /55, 93% on NC CPAP   
( ) * Fever absent or acceptable for next level of care 7/22/2019 13:30:29 EDT by Ashley Bird   
  temp 99.5   
( ) * Hypoxemia absent 7/22/2019 13:30:29 EDT by Jo-Ann Magana on NC CPAP   
(X) * Tachypnea absent 7/22/2019 13:30:29 EDT by Ashley Bird   
  RR 22,24   
(X) * Cultures negative or infection identified and under adequate treatment 7/22/2019 13:30:29 EDT by Ashley Bird   
  negative   
(X) * Mental status at baseline 7/22/2019 13:30:29 EDT by Ashley Bird   
  alert and oriented x 3   
(X) * Metabolic derangement (eg, dehydration, acidosis) absent   
(X) * End-organ dysfunction (eg, myocardial ischemia, renal failure) absent 7/22/2019 13:30:29 EDT by Jo-Ann Magana BUN 13, creatinine 1.28   
( ) * Discharge plans and education understood Activity   
(X) * Ambulatory 7/22/2019 13:30:29 EDT by Jo-Ann Magana activity as tolerted with assist   
Routes   
(X) * Oral hydration, medications[J] 7/22/2019 13:30:29 EDT by Ashley Bird   Tylenol 650mg PO q4h PRN x 1 Norvasc 5mg PO daily Heparin 5,000u SC q8h Humalog SC 4x daily SSI Lisinopril 10mg PO daily Klor-Con PO 20meq daily Neutra-Phos PO 4x daily Lasix 20mg PO 2x daily Zosyn 3.375g IV q8h   
(X) Usual diet 7/22/2019 13:30:29 EDT by Jomarie Cabot   
  diabetic carb regular diet Interventions (X) WBC   
7/22/2019 13:30:29 EDT by Jomarie Cabot   
  13.2 Medications ( ) * Antimicrobial treatment not necessary or treatment at next level of care arranged[D] 7/22/2019 13:30:29 EDT by Jomarie Cabot Subject: Additional Clinical Information 7/18/19 Nephrology A/P:   
  
CYNDI   
- Multifcatorial : Now resolved     
- Cr stable since restarting Lisinopril and Lasix   
- changed Lasix to 40 mg daily   
- labs daily   
     
CKD - ?? baseline   
     
Chronic venous insufficiency/dermatitis/wounds   
-per Dr Barron    
     
HTN, relative hypotension   
     
DM   
     
Obesity, JOSUÉ IM A/P Pt feels ok, leg erythema improving, denied fever.   Gen: NAD, morbid obese HEENT: anicteric sclerae, normal conjunctiva, oropharynx clear, MM moist   
Neck: supple, trachea midline, no adenopathy Heart: RRR, no MRG, no JVD, no peripheral edema Lungs: CTA b/l, non-labored respirations Abd: soft, NT, ND, BS+, no organomegaly Extr: bilateral LE swelling. Left leg erythematous and mild tender Skin: dry, no rash Neuro: CN II-XII grossly intact, normal speech, moves all extremities Psych: normal mood, appropriate affect Assessment/Plan:   
Sepsis 2/2 left lower extremity cellulitis - improving slowly   
- hx chronic venous stasis changes with edema -   lactic acidosis on POA- resolved   
- leucocytosis 23.3 poa improving   
- blood cx: NGTD   
- wound cx was not sent on admission   
- dced vanco 7/16.   
- wound care - appreciate ID input - c/w IV zosyn   
     
Acute metabolic encephalopathy resolved   
     
 CYNDI - improving   
- nephrology on board   
- cr unknown baseline   
- nephro ok with lasix and lisinopril   
     
DM - A1c 6.7. C/w iss high sensitivity . Metformin on hold HTN - c/w amlodipine, lisinopril JOSUÉ on CPAP   
     
Morbid Obesity Body mass index is 64.2 kg/m ². weight loss recommended   
     
  
ID assessment/plan:   
still hs prominent erythema of the left leg.   alert, no distress, severe erythema with bullae posterir calf, medial thight and proximal lateral thigh of LLE. Plan: continue Zosyn Labs: WBC 13.2, RBC 3.48, HGB 10.2, HCT 32.9, chloride 109, glucose 121 Plan: activity as tolerated with assist, SCD's, bladder checks, daily weights, fall precautions, I&O's,. OT/PT consulted, spot check oximetry * Milestone  
   
Sepsis and Other Febrile Illness, without Focal Infection - Care Day 5 (7/17/2019) by Marylou Taylor RN  
 
   
Review Entered Review Status 7/22/2019 13:21 Completed  
   
Criteria Review Care Day: 5 Care Date: 7/17/2019 Level of Care: Inpatient Floor Guideline Day 4 Level Of Care (X) Floor to discharge[I] 7/22/2019 13:21:52 EDT by Aleah Carter   
  medical bed Clinical Status   
(X) * Hemodynamic stability 7/22/2019 13:21:52 EDT by Aleah Carter   
  98.8, HR 80, RR 20, /67, 95% on CPAP NC   
(X) * Fever absent or acceptable for next level of care 7/22/2019 13:21:52 EDT by Aleah Carter   
  98.8   
( ) * Hypoxemia absent 7/22/2019 13:21:52 EDT by Aleah Carter   
  on nasal CPAP   
(X) * Tachypnea absent 7/22/2019 13:21:52 EDT by Aleah Carter   
  RR 18,20   
(X) * Cultures negative or infection identified and under adequate treatment 7/22/2019 13:21:52 EDT by Aleah Carter   
  negative blood cultures   
(X) * Mental status at baseline 7/22/2019 13:21:52 EDT by Aleah Carter   
  alert and oriented x 3   
(X) * Metabolic derangement (eg, dehydration, acidosis) absent (X) * End-organ dysfunction (eg, myocardial ischemia, renal failure) absent 7/22/2019 13:21:52 EDT by Kimberly Herman   
  BUn 13, Creatinine 1.20   
( ) * Discharge plans and education understood Activity   
(X) * Ambulatory 7/22/2019 13:21:52 EDT by Kimberly Herman   
  activity as tolerated with assist   
Routes   
(X) * Oral hydration, medications[J] 7/22/2019 13:21:52 EDT by Kimberly Herman   
  Humalog SC 4x daily SSI Lasix 20mg PO 2x daily Neutra-Phos PO 4x daily Klor-Con 20meq PO daily Zosyn 3.375g IV q8h Lisinopril 10mg PO daily Heparin 5,000u SC q8h Norvasc 5mg PO daily   
(X) Usual diet 7/22/2019 13:21:52 EDT by Kimberly Herman   
  diabetic carb regular diet Interventions (X) WBC   
7/22/2019 13:21:52 EDT by Kimberly Herman   
  WBC 14.0 Medications ( ) * Antimicrobial treatment not necessary or treatment at next level of care arranged[D] 7/22/2019 13:21:52 EDT by Kimberly Herman Subject: Additional Clinical Information 7/17/19 IM A/P:   
Patient is anxious since last night after he talked with ID. Reassured the patient and his cellulitis is getting better and probably needs 2 more days of IV abx. Spoke with his brother on phone and reassured him and updated patient's status and possible discharge on Friday.   
     
Assessment/Plan:   
Sepsis 2/2 left lower extremity cellulitis - improving slowly   
- hx chronic venous stasis changes with edema -   lactic acidosis on POA- resolved   
- leucocytosis 23.3 poa improving   
- blood cx: NGTD   
- wound cx were not sent on admission   
- dced vanco 7/16.   
- wound care - appreciate ID input - c/w IV zosyn   
     
Acute metabolic encephalopathy resolved   
     
CYNDI - improving   
- nephrology on board   
- cr unknown baseline   
- nephro ok with lasix and lisinopril   
     
DM - A1c 6.7. Changed iss to high sensitivity . Metformin on hold HTN - c/w amlodipine, lisinopril JOSUÉ on CPAP Nephrology A/P:   
  
CYNDI -   presumed CYNDI, no recent baseline; volume depletion from outpatient diuretic, ACEi   and Meloxicam the most likely causes; infection possible; rule out obstruction   
- no obstruction on imagining - Cr down to 1.3   
- continue Lisinopril and lasix on d/c   
- labs pending   
     
CKD - ?? baseline   
     
Chronic venous insufficiency/dermatitis/wounds   
-per Dr Katarina Livingston   
     
HTN, relative hypotension   
     
DM   
     
Obesity, JOSUÉ   
  
ID A/P Continue Zosyn, will likely be slow to resolve Labs: WBC 14.0, RBC 3.46, HGB 10.3, HCT 32.1, lymphocytes 10, metamyelocytes 2, chlroide 109, glucose 153, calcium 8.1 Plan: activity as tolerated with assist, SCD's, bladder checks, daily weights, fall precautions, I&O's,. OT/PT consulted, spot check oximetry * Milestone  
   
Sepsis and Other Febrile Illness, without Focal Infection - Care Day 4 (7/16/2019) by Felicity Pan RN  
 
   
Review Entered Review Status 7/19/2019 14:27 Completed  
   
Criteria Review Care Day: 4 Care Date: 7/16/2019 Level of Care: Inpatient Floor Guideline Day 4 Level Of Care (X) Floor to discharge[I] 7/19/2019 14:27:09 EDT by [facility] [place] Clinical Status   
(X) * Hemodynamic stability 7/19/2019 14:27:09 EDT by Aiyana Khoury   
  98.6, HR 72, RR 18, /80, 96% on RA   
(X) * Fever absent or acceptable for next level of care 7/19/2019 14:27:09 EDT by Aiyana Khoury   
  afebrile 98.6   
(X) * Hypoxemia absent 7/19/2019 14:27:09 EDT by Aiyana Khoury   
  96% on RA   
(X) * Tachypnea absent 7/19/2019 14:27:09 EDT by Aiyana Khoury   
  RR 18   
(X) * Cultures negative or infection identified and under adequate treatment 7/19/2019 14:27:09 EDT by Aiyana Khoury   
  paired blood cultures no growth after 5 days   
(X) * Mental status at baseline 7/19/2019 14:27:09 EDT by Brittany Fisher   
  alert and oriented x 3   
(X) * Metabolic derangement (eg, dehydration, acidosis) absent   
(X) * End-organ dysfunction (eg, myocardial ischemia, renal failure) absent 7/19/2019 14:27:09 EDT by Anuradha Lucero presumed CYNDI, no recent baseline; volume depletion from outpatient diuretic, ACEi  and Meloxicam the most likely causes; infection possible ( ) * Discharge plans and education understood Activity   
(X) * Ambulatory 7/19/2019 14:27:09 EDT by Brittany Fisher   
  ambulate with assist   
Routes   
(X) * Oral hydration, medications[J] 7/19/2019 14:27:09 EDT by Brittany Fisher   
  Duo-neb q4h PRN Norvasc 5mg PO daily Heparin 5,000u SC q8h Lisinopril 10mg PO daily 
zosyn 3.375g IV q8h Klor-Con 20meq PO daily Lasix 20mg PO 2x daily Humalog SC 4x daily SSI Vancomycin 2,000mg IV q18h (X) Usual diet 7/19/2019 14:27:09 EDT by Brittany Fisher   
  diabetic carb regular diet Medications ( ) * Antimicrobial treatment not necessary or treatment at next level of care arranged[D] 7/19/2019 14:27:09 EDT by Brittany Fisher Subject: Additional Clinical Information 7/16/19 Nephrology note: No complaints, Cr down, good UOP. Nephrology plan:CYNDI -  presumed CYNDI, no recent baseline; volume depletion from outpatient diuretic, ACEi   and Meloxicam the most likely causes; infection possible; rule out obstruction   
- no obstruction on imagining - Cr down to 1.3   
- continue Lisinopril and lasix on d/c   
     
CKD - ?? baseline   
     
Chronic venous insufficiency/dermatitis/wounds   
-treated at wound care center   
     
HTN, relative hypotension   
     
DM   
     
  
Obesity, JOSUÉ IM assessment/plan:   
Feeling OK, still has leg erthema, denied fever or chills.   bilateral LE swelling. Left leg erythematous and tender IM plan:Assessment/Plan:   
Sepsis 2/2 left lower extremity cellulitis - hx chronic venous stasis changes with edema -   lactic acidosis on POA- resolved   
- leucocytosis 23.3 poa improving 13.2   
- blood cx: NGTD   
- wound cx were not sent   
- dced vanco 7/16. C/w iv zosyn   
- wound care - patient left leg still erythematous - ID consult placed   
     
Acute metabolic encephalopathy resolved   
     
CYNDI - improving   
- nephrology on board   
- cr unknown baseline   
- nephro ok with lasix and lisinopril   
     
DM - A1c 6.7. C/w iss. Metformin on hold HTN - c/w amlodipine, lisinopril JOSUÉ on CPAP   
     
Morbid Obesity Body mass index is 62.78 kg/m ². weight loss recommended   
     
Code status: Full DVT prophylaxis: heparin Disposition: TBD. Home with [facility] when ready ID assessment/plan:Impression   
                         
1. Acute cellulitis of the LLE -- The clinical history is c/w Streptococcal cellulitis but obesity, diabetes, and venous stasis dermatitis increase the risk of other pathogens also. He has no history of MRSA.   
     
2. NIDDM   
     
3. HTN   
     
4. JOSUÉ   
     
5. CYNDI -- baseline creatinine is not known   
     
6. Hx of presumed DJD   
     
     
Recommend:   
                         
1. Continue Zosyn   
     
2. Elevation of the left leg   
     
3. I suspect he will be slow to respond and will need to stay in the hospital for at least a few more days Labs:   
Results for Anuj Cecilia (MRN [id number]) as of 7/19/2019 14:09   
  
  
 
  
 
  
ã Ref. Range 7/16/2019 08:55 WBC Latest Ref Range: 4.1 - 11.1 K/uL 
 
  
13.2 (H) NRBC Latest Ref Range: 0  WBC 
 
  
0.2 (H) RBC Latest Ref Range: 4.10 - 5.70 M/uL 
 
  
3.48 (L) HGB Latest Ref Range: 12.1 - 17.0 g/dL 10.4 (L) HCT Latest Ref Range: 36.6 - 50.3 % 31.7 (L) PLATELET Latest Ref Range: 150 - 400 K/uL 
 
  
280 NEUTROPHILS Latest Ref Range: 32 - 75 % 67 LYMPHOCYTES Latest Ref Range: 12 - 49 % 15 IMMATURE GRANULOCYTES Latest Units: % 
 
  
0 
 
  
 
  
METAMYELOCYTES Latest Ref Range: 0 % 
 
  
1 (H) MYELOCYTES Latest Ref Range: 0 % 
 
  
2 (H) ABSOLUTE NRBC Latest Ref Range: 0.00 - 0.01 K/uL 
 
  
0.02 (H) 
 
  
 
  
ABS. NEUTROPHILS Latest Ref Range: 1.8 - 8.0 K/UL 
 
  
9.9 (H) 
 
  
 
  
ABS. IMM. GRANS. Latest Units: K/UL 
 
  
0.0 
 
  
 
  
ABS. MONOCYTES Latest Ref Range: 0.0 - 1.0 K/UL 
 
  
1.2 (H) Results for Viji Duron (MRN [id number]) as of 7/19/2019 14:09   
  
  
 
  
 
  
ã Ref. Range 7/16/2019 07:45 Sodium Latest Ref Range: 136 - 145 mmol/L 
 
  
138 Potassium Latest Ref Range: 3.5 - 5.1 mmol/L 
 
  
3.5 Chloride Latest Ref Range: 97 - 108 mmol/L 
 
  
108 Glucose Latest Ref Range: 65 - 100 mg/dL 127 (H) BUN Latest Ref Range: 6 - 20 MG/DL 
 
  
17 Creatinine Latest Ref Range: 0.70 - 1.30 MG/DL 
 
  
1.33 (H) BUN/Creatinine ratio Latest Ref Range: 12 - 20   
 
  
13 Calcium Latest Ref Range: 8.5 - 10.1 MG/DL 
 
  
8.2 (L) 
 
  
 
  
GFR est non-AA Latest Ref Range: >60 ml/min/1.73m2 
 
  
55 (L) 
 
  
 
  
GFR est AA Latest Ref Range: >60 ml/min/1.73m2 
 
  
>60 Plan:   
activity as tolerated with assist, ambulate with assist, SCD's, bladder checks, daily weights, fall precautions, I&O, HH consult, OT/PT consult, CPAP at night, spot check oximetry, strict I&O * Milestone  
   
Sepsis and Other Febrile Illness, without Focal Infection - Care Day 3 (7/15/2019) by Griselda Wren RN  
 
   
Review Entered Review Status 7/19/2019 14:12 Completed  
   
Criteria Review Care Day: 3 Care Date: 7/15/2019 Level of Care: Inpatient Floor Guideline Day 3 Level Of Care (X) Floor 7/19/2019 14:12:06 EDT by Etta Guerra   
  medical bed Clinical Status   
(X) * Mental status at baseline 7/19/2019 14:12:06 EDT by Starr Schumacher alert and oriented x 3   
(X) * Fever absent or reduced 7/19/2019 14:12:06 EDT by Etta Guerra   
  afebrile 98.5 Activity (X) Activity as tolerated 7/19/2019 14:12:06 EDT by Etta Guerra   
  activity as tolerated with assist   
Routes   
(X) * Oral hydration 7/19/2019 14:12:06 EDT by Etta Guerra   
  840ml PO intake (X) Diet as tolerated 7/19/2019 14:12:06 EDT by Etta Guerra   
  diabetic carb regular diet   
(X) Parenteral or oral medication 7/19/2019 14:12:06 EDT by Etta Guerra   
  duo nebs q4h Norvasc 5mg PO daily Lisinopril 10mg PO daily Klor-Con 20meq PO daily Lasix 20mg PO 2x daily 
lantus 10u SC qhs 
Humalog SC 4x daily SSI 
LR 75ml/hr Medications (X) Possible antimicrobial treatment 7/19/2019 14:12:06 EDT by Etta Guerra   
  Zosyn 3.375g IV q8h Vancomycin 2000mg IV q18h (X) Possible DVT prophylaxis 7/19/2019 14:12:06 EDT by Etta Guerra   
  heparin 5,000units SC q8h   
7/19/2019 14:12:06 EDT by Etta Guerra Subject: Additional Clinical Information 7/15/19 IM note: As discussed 7/15/2019 pt for anticipated discharge on oral abx in am 7/16 Lungs CTAB, abdomen is soft, non distended, non tender, +BS, bilateral LE swelling/cellulits/wound sena left leg, line of demarcation drawn on 7/15/19ASS/PLAN:   
     
1. Sepsis from cellulitis - cont abx, cult neg to date 1a. Septic Encephalopathy, resolved,   
2. JOSUÉ/ OHS- cont CPAP setting at 15, cont hs   
  
3. Check A1C and cont SSI last a1c > 11 start lantus4. Morbid obesity Body mass index is 62.78 kg/m ².   Pt encouraged to work toward 10 - 15 % wt reduction. 5. CKD stage 3 ( no previous values ) OR CYNDI due to ACE+NSAID + metformin Nephrology note:   
Feeling better, better U/O, has pustular rash on left calf and back of thigh. nephrology plan:CYNDI - presumed CYNDI, no recent baseline; volume depletion from outpatient diuretic, ACEi   and Meloxicam the most likely causes; infection possible; rule out obstruction   
-no obstruction on imagine   
-creat improving with IVF - continue   
- continue to hold Lisinopril   
- may need to resume diuretics tomorrow   
     
CKD - ?? baseline   
     
Chronic venous insufficiency/dermatitis/wounds   
  
-treated at wound care center Vitals: 98.6, HR 79, RR 18, /66, 96% on RA Labs: WBC 14.3, RBC 3.43, HGB 10.2, HCT 31.1, immature granulocytes 3, abs neutrophils 10.5, imm grans 0.4, abs monocytes 1.4, potassium 3.4, glucose 112, BUN 23, Creatinine 1.43, calcium 8.0 Plan: activity as tolerted with assist, SCD's, bladder checks, daily weights, fall precautions, I&O, HH consult, OT/PT consult, CPAP at night, spot check oximetry, strict I&O's, vital signs q4h * Milestone

## 2019-09-04 ENCOUNTER — HOSPITAL ENCOUNTER (OUTPATIENT)
Dept: WOUND CARE | Age: 64
Discharge: HOME | End: 2019-09-04
Payer: COMMERCIAL

## 2019-09-04 VITALS — DIASTOLIC BLOOD PRESSURE: 67 MMHG | SYSTOLIC BLOOD PRESSURE: 118 MMHG | RESPIRATION RATE: 18 BRPM | TEMPERATURE: 97.9 F

## 2019-09-04 PROCEDURE — 99211 OFF/OP EST MAY X REQ PHY/QHP: CPT

## 2019-09-04 NOTE — WOUND CARE
Rosie Merino DPM - [place] Number LUISA Mckeon, [address] - Follow up     Assessment/Plan:    Venous insufficiency with inflammation right lower extremity (I87.321)    Venous insufficiency with inflammation left lower extremity (D44.066)     Lymphedema  (Y40)    - Pt evaluated and treated. - All wounds have healed. - Compression achieved with double tubigrips. - Patient has circades at home. Advised to use them while he is on his feet. - Patient to use his lymphapress twice a day. - Patient is discharged from wound care center. Subjective:  Pt comes in for follow up to left anterior leg ulceration. Wound had healed. Patient had an admission for cellulitis and was in the hospital follow by rehab for 1 month. Patient was asked by PCP to come to the [facility] for advice on lymphedema pumps. Negative for fever, chills, nausea, vomiting, chest pain, shortness of breath. ROS:  Consitutional: no weight loss, night sweats, fatigue / malaise / lethargy. Musculoskeletal: no joint / extremity pain, misalignment, stiffness, decreased ROM, crepitus. Integument: No pruritis, rashes, lesions,no open wounds.   Psychiatric: No depression, anxiety, paranoia      History:  Wound care  Allergies   Allergen Reactions    Phenobarbital Unknown (comments)     Family History   Problem Relation Age of Onset    Heart Disease Mother     Heart Disease Father     Diabetes Brother       Past Medical History:   Diagnosis Date    Arthritis     Asthma     Diabetes ([address]     Hypertension     Morbid obesity ([facility]     Sleep apnea      Past Surgical History:   Procedure Laterality Date    HX HEENT       Social History     Tobacco Use    Smoking status: Former Smoker     Last attempt to quit:      Years since quittin.6    Smokeless tobacco: Never Used   Substance Use Topics    Alcohol use: Not Currently       Social History     Substance and Sexual Activity   Alcohol Use Not Currently     Social History     Substance and Sexual Activity   Drug Use Not Currently      Social History     Tobacco Use   Smoking Status Former Smoker    Last attempt to quit: 2012    Years since quittin.6   Smokeless Tobacco Never Used     Current Outpatient Medications   Medication Sig    furosemide (LASIX) 40 mg tablet Take 1 Tab by mouth daily.  acetaminophen (TYLENOL) 325 mg tablet Take 2 Tabs by mouth every four (4) hours as needed for Pain.  lisinopril (PRINIVIL, ZESTRIL) 10 mg tablet Take 10 mg by mouth daily.  amLODIPine (NORVASC) 5 mg tablet Take 5 mg by mouth daily.  metFORMIN (GLUCOPHAGE) 500 mg tablet Take 500 mg by mouth daily (with breakfast).  potassium chloride SR (KLOR-CON 10) 10 mEq tablet Take 20 mEq by mouth daily.  meloxicam (MOBIC) 15 mg tablet Take 15 mg by mouth daily as needed for Pain. No current facility-administered medications for this encounter. Objective:  Visit Vitals  /67 (BP 1 Location: Right arm, BP Patient Position: At rest)   Temp 97.9 °F (36.6 °C)   Resp 18       Vascular:  B/L LE  DP 1/4; PT 1/4  capillary fill time brisk, pitting and spongy edema is present B/L, skin temperature is cool, varicosities are absent. Dermatological:    B/L LE erythematous patches  exhibits hemosiderin deposition. No weeping areas. Wound:   Location: left anterior lateral leg  Healed     Nails are thickened, 3mm thick, with subungual debris. There are extensive skin cracks at both heels. There is no maceration of the interspaces of the feet b/l. Neurological:  DTR are present, protective sensation per 5.07 [place] monofilament is intact, patient is AAOx3, mood is normal. Epicritic sensation is intact. Orthopedic:  B/L LE are symmetric, ROM of ankle, STJ, 1st MTPJ is limited, MMT 5 out of 5 for B/L LE.   There has been no amputation    Constitutional: Pt is a well developed, elderly obese male    Lymphatics: negative tenderness to palpation of neck/axillary/inguinal nodes.

## 2019-09-04 NOTE — DISCHARGE INSTRUCTIONS
Discharge Instructions for  [address] [facility], [address] [place] [address] Avenue  Telephone: [phone]    NAME:  Maynor St  YOB: 1955  MEDICAL RECORD NUMBER:  [id number]  DATE:  9/4/2019    Wound Cleansing:   Do not scrub or use excessive force. Cleanse wound prior to applying a clean dressing with:  [] Normal Saline [] Keep Wound Dry in Shower    [] Wound Cleanser   [] Cleanse wound with Mild Soap & Water  [x] May Shower at Discharge   [] Other:       Topical Treatments:  Do not apply lotions, creams, or ointments to wound bed unless directed. [] Apply moisturizing lotion to skin surrounding the wound prior to dressing change.  [] Apply antifungal ointment to skin surrounding the wound prior to dressing change.  [] Apply thin film of moisture barrier ointment to skin immediately around wound. [x] Other:  Moisturize daily       Edema Control:  Apply: [x] Compression Stocking wear circaid compression wraps daily [x]Right Leg [x]Left Leg   [] Tubigrip []Right Leg Double Layer []Left Leg Double Layer       []Right Leg Single Layer []Left Leg Single Layer   [] SpandaGrip []Right Leg  []Left Leg      []Low compression 5-10 mm/Hg      []Medium compression 10-20 mm/Hg     []High compression  20-30 mm/Hg   every morning immediately when getting up should be applied to affected leg(s) from mid foot to knee making sure to cover the heel. Remove every night before going to bed. [x] Elevate leg(s) above the level of the heart when sitting. [x] Avoid prolonged standing in one place.   Use lymphadema pump twice daily for 1 hour each session   [] Elevate arm/hand above the level of the heart []RightArm []LeftArm      Dietary:  [x] Diet as tolerated: [] Calorie Diabetic Diet: [] No Added Salt:  [x] Increase Protein: [] Other:   Activity:  [x] Activity as tolerated:  [] Patient has no activity restrictions     [] Strict Bedrest: [] Remain off Work: [] May return to full duty work:                                   [] Return to work with restrictions:             Return Appointment:  [] Wound and dressing supply provider:   [] ECF or Home Healthcare:  [] Wound Assessment: [] Physician or NP scheduled for Wound Assessment:   [x] Return Appointment: As Needed  [] Ordered tests:      Electronically signed on 9/4/2019 at 4:27 PM     [facility] Information: Should you experience any significant changes in your wound(s) or have questions about your wound care, please contact the [facility] at [address] [place] [address] 8:00 am - 4:30. If you need help with your wound outside these hours and cannot wait until we are again available, contact your PCP or go to the hospital emergency room. PLEASE NOTE: IF YOU ARE UNABLE TO OBTAIN WOUND SUPPLIES, CONTINUE TO USE THE SUPPLIES YOU HAVE AVAILABLE UNTIL YOU ARE ABLE TO REACH US. IT IS MOST IMPORTANT TO KEEP THE WOUND COVERED AT ALL TIMES.       Physician Signature:_______________________    Date: ___________ Time:  ____________

## 2019-09-05 NOTE — PROGRESS NOTES
Clinic Level of Care Assessment    NAME:  Naye Greco  YOB: 1955 GENDER: male  MEDICAL RECORD NUMBER:  [id number]   DATE:  9/4/2019      Wound Count Document in [address] [place] [address]  Number of Wounds Assessed Points   No Wounds/Ulcers [x]   0   Less than Three Wounds/Ulcers []   1   3-6 Wounds/Ulcers []   2   Greater than 6 Wounds/Ulcers []   3     Ambulation Status Document in Coord/RAVEN/Mobility tab  Status Definition Points   Independent Independently able to ambulate. Fully able (without any assistance) to get on/off exam table/chair. [x]   0   Minimal Physical Assistance Requires physical assistance of one person to ambulate and/or position patient to be examined. Includes necessary physical assistance to position lower extremities on/off stool. []   1   Moderate Physical Assistance Requires at least one staff member to physically assist patient in ambulating into treatment room, and on/off exam table. []   2   Full Assistance Requires assistance of at least two staff members to transfer patient into treatment room and/or on/off exam table/chair. \"Total Transfer\". []   3     Dressing Complexity Document in LDA and Write Appropriate Order  Complexity Definition Points   No Dressing  [x]   0   Simple Minimal, simple dressing. i.e. Band-aid, gauze, simple wrap. []   1   Intermediate Moderately complicated requiring licensed personnel to apply i.e. collagen matrix, ointments, gels, alginates. []   2   Complex Complicated requiring licensed personnel to apply dressings 6 or more wounds. []   3     Teaching Effort Document in Education Tab   Effort Definition Points   No Teaching  []   0   Simple Reinforce two or less topics. Document in Education navigator. [x]   1   Intermediate Reinforce three to five topics and/or one additional   new topic. Document in Education navigator. []   2   Complex Teach more than one new topic.  New patient information   packet reviewed and/or reinforce more than three topics. Document in Education navigator. HBO initial instruction. []   3       Patient Assessment and Planning  Planning Definition Points   Simple Multiple System Simple: Simple follow-up with routine assessment and planning. If Discharged, instructions and long term/follow-up care given to patient/caregiver. Discharged, instructions and/or After Visit Summary given to patient/caregiver and instructions completed. [x]   1   Intermediate Multiple System Intermediate: Contact with outside resources; i.e. Telephone calls to home health, [facility]. May include filling out forms and writing letters, arranging transportation, communication with insurance , vendors, etc.  Discharged, instructions and/or After Visit Summary given to patient/caregiver and instructions completed. []   2   Complex Multiple System Complex: Full, comprehensive assessment and planning. Follow the entire navigator under Wound Visit charting filling out each tab which includes OP Adm Database Screening, Education and CarePlan  HBO risk assessment completed. Discharged, instructions and/or After Visit Summary given to patient/caregiver and instructions completed.    []   3           Is this the Patient's First Visit to the [address] [place] [address] Road  No      Is this Patient Established @ [facility]  Yes             Clinical Level of Care      Points  0-2  Level 1 [x]     Points  3-5  Level 2 []     Points  6-9  Level 3 []     Points  10-12  Level 4 []     Points  13-15  Level 5 []       Electronically signed by Domenico Talamantes RN on 9/5/2019 at 10:21 AM

## 2024-07-12 NOTE — PROGRESS NOTES
Bedside shift change report given to Nicole  (oncoming nurse) by Kal Lee (offgoing nurse).  Report included the following information SBAR, Kardex, Intake/Output and MAR Waiting on cardiology to place holter